# Patient Record
Sex: FEMALE | Race: WHITE | NOT HISPANIC OR LATINO | Employment: OTHER | URBAN - NONMETROPOLITAN AREA
[De-identification: names, ages, dates, MRNs, and addresses within clinical notes are randomized per-mention and may not be internally consistent; named-entity substitution may affect disease eponyms.]

---

## 2017-06-06 ENCOUNTER — COMMUNICATION - GICH (OUTPATIENT)
Dept: FAMILY MEDICINE | Facility: OTHER | Age: 72
End: 2017-06-06

## 2017-06-06 DIAGNOSIS — M81.0 AGE-RELATED OSTEOPOROSIS WITHOUT CURRENT PATHOLOGICAL FRACTURE: ICD-10-CM

## 2017-06-06 DIAGNOSIS — E78.5 HYPERLIPIDEMIA: ICD-10-CM

## 2017-08-25 ENCOUNTER — HISTORY (OUTPATIENT)
Dept: INTERNAL MEDICINE | Facility: OTHER | Age: 72
End: 2017-08-25

## 2017-08-29 ENCOUNTER — AMBULATORY - GICH (OUTPATIENT)
Dept: LAB | Facility: OTHER | Age: 72
End: 2017-08-29

## 2017-08-29 DIAGNOSIS — E78.5 HYPERLIPIDEMIA: ICD-10-CM

## 2017-08-29 LAB
A/G RATIO - HISTORICAL: 1.5 (ref 1–2)
ALBUMIN SERPL-MCNC: 4.2 G/DL (ref 3.5–5.7)
ALP SERPL-CCNC: 15 IU/L (ref 34–104)
ALT (SGPT) - HISTORICAL: 15 IU/L (ref 7–52)
ANION GAP - HISTORICAL: 10 (ref 5–18)
AST SERPL-CCNC: 23 IU/L (ref 13–39)
BILIRUB SERPL-MCNC: 0.4 MG/DL (ref 0.3–1)
BUN SERPL-MCNC: 14 MG/DL (ref 7–25)
BUN/CREAT RATIO - HISTORICAL: 21
CALCIUM SERPL-MCNC: 10.1 MG/DL (ref 8.6–10.3)
CHLORIDE SERPLBLD-SCNC: 108 MMOL/L (ref 98–107)
CHOL/HDL RATIO - HISTORICAL: 2.12
CHOLESTEROL TOTAL: 256 MG/DL
CO2 SERPL-SCNC: 25 MMOL/L (ref 21–31)
CREAT SERPL-MCNC: 0.66 MG/DL (ref 0.7–1.3)
GFR IF NOT AFRICAN AMERICAN - HISTORICAL: >60 ML/MIN/1.73M2
GLOBULIN - HISTORICAL: 2.8 G/DL (ref 2–3.7)
GLUCOSE SERPL-MCNC: 80 MG/DL (ref 70–105)
HDLC SERPL-MCNC: 121 MG/DL (ref 23–92)
LDLC SERPL CALC-MCNC: 115 MG/DL
NON-HDL CHOLESTEROL - HISTORICAL: 135 MG/DL
PATIENT STATUS - HISTORICAL: ABNORMAL
POTASSIUM SERPL-SCNC: 4.2 MMOL/L (ref 3.5–5.1)
PROT SERPL-MCNC: 7 G/DL (ref 6.4–8.9)
SODIUM SERPL-SCNC: 143 MMOL/L (ref 133–143)
TRIGL SERPL-MCNC: 101 MG/DL

## 2017-08-31 ENCOUNTER — COMMUNICATION - GICH (OUTPATIENT)
Dept: PHARMACY | Facility: OTHER | Age: 72
End: 2017-08-31

## 2017-08-31 ENCOUNTER — HISTORY (OUTPATIENT)
Dept: INTERNAL MEDICINE | Facility: OTHER | Age: 72
End: 2017-08-31

## 2017-08-31 ENCOUNTER — HOSPITAL ENCOUNTER (OUTPATIENT)
Dept: INFUSION THERAPY | Facility: OTHER | Age: 72
End: 2017-08-31
Attending: INTERNAL MEDICINE

## 2017-08-31 ENCOUNTER — OFFICE VISIT - GICH (OUTPATIENT)
Dept: INTERNAL MEDICINE | Facility: OTHER | Age: 72
End: 2017-08-31

## 2017-08-31 DIAGNOSIS — H81.10 BENIGN PAROXYSMAL VERTIGO: ICD-10-CM

## 2017-08-31 DIAGNOSIS — J44.9 CHRONIC OBSTRUCTIVE PULMONARY DISEASE (H): ICD-10-CM

## 2017-08-31 DIAGNOSIS — E78.2 MIXED HYPERLIPIDEMIA: ICD-10-CM

## 2017-08-31 DIAGNOSIS — M81.0 AGE-RELATED OSTEOPOROSIS WITHOUT CURRENT PATHOLOGICAL FRACTURE: ICD-10-CM

## 2017-08-31 DIAGNOSIS — Z78.9 OTHER SPECIFIED HEALTH STATUS (CODE): ICD-10-CM

## 2017-08-31 DIAGNOSIS — R63.4 ABNORMAL WEIGHT LOSS: ICD-10-CM

## 2017-08-31 DIAGNOSIS — I67.9 CEREBROVASCULAR DISEASE: ICD-10-CM

## 2017-08-31 DIAGNOSIS — Z53.29 PROCEDURE AND TREATMENT NOT CARRIED OUT BECAUSE OF PATIENT'S DECISION FOR OTHER REASONS: ICD-10-CM

## 2017-08-31 ASSESSMENT — PATIENT HEALTH QUESTIONNAIRE - PHQ9: SUM OF ALL RESPONSES TO PHQ QUESTIONS 1-9: 0

## 2017-09-01 ENCOUNTER — HOSPITAL ENCOUNTER (OUTPATIENT)
Dept: PHYSICAL THERAPY | Facility: OTHER | Age: 72
Setting detail: THERAPIES SERIES
End: 2017-09-01
Attending: INTERNAL MEDICINE

## 2017-09-01 DIAGNOSIS — H81.10 BENIGN PAROXYSMAL VERTIGO: ICD-10-CM

## 2017-09-06 ENCOUNTER — COMMUNICATION - GICH (OUTPATIENT)
Dept: PHYSICAL THERAPY | Facility: OTHER | Age: 72
End: 2017-09-06

## 2017-12-27 NOTE — PROGRESS NOTES
Patient Information     Patient Name MRN Sex Yanci Carter [id number] Female 1945      Progress Notes by Goodell, Brenda, RN at 2017  3:05 PM     Author:  Goodell, Brenda, RN Service:  (none) Author Type:  NURS- Registered Nurse     Filed:  2017  3:08 PM Date of Service:  2017  3:05 PM Status:  Signed     :  Goodell, Brenda, RN (NURS- Registered Nurse)            Patient arrived ambulatory for Reclast infusion. No labs needed. After infusion without incident, lock was flushed with saline and taken out. Patient encouraged to drink lots of water and to keep taking her calcium and vitamin D. Patient left ambulatory.

## 2017-12-28 NOTE — TELEPHONE ENCOUNTER
Patient Information     Patient Name MRN Sex Yanci Carter [id number] Female 1945      Telephone Encounter by Janes Gunter MD at 2017  1:10 PM     Author:  Janes Gunter MD Service:  (none) Author Type:  Physician     Filed:  2017  1:11 PM Encounter Date:  2017 Status:  Signed     :  Janes Gunter MD (Physician)            There are no indications for her TSH as it has been normal and her insurance will likely not cover the test an lo s[place].  We can certainly order but she may be responsible to pay for it.  Reclast can be scheduled through the shot nurse schedule.

## 2017-12-28 NOTE — TELEPHONE ENCOUNTER
Patient Information     Patient Name MRN Yanci Hurt [id number] Female 1945      Telephone Encounter by Neelima Roman at 2017  1:03 PM     Author:  Neelima Roman Service:  (none) Author Type:  (none)     Filed:  2017  1:05 PM Encounter Date:  2017 Status:  Signed     :  Neelima Roman            Patient notified of labs being placed and her to contact dermatology. Patient is stating that she wants her TSH included this time with labs, if possible, and also wants to redo her reclast around the same time as her appt on .  Neelima Roman LPN .............2017  1:04 PM

## 2017-12-28 NOTE — TELEPHONE ENCOUNTER
Patient Information     Patient Name MRN Sex Yanci Carter [id number] Female 1945      Telephone Encounter by Jeannie Mccord at 2017 10:54 AM     Author:  Jeannie Mccord Service:  (none) Author Type:  (none)     Filed:  2017 10:56 AM Encounter Date:  2017 Status:  Signed     :  Jeannie Mccord            DWS - PATIENT REQUESTING ORDER FOR LABS TO BE DONE PRIOR TO APPT ON 17. PATIENT WOULD ALSO LIKE ORDER FOR RECLAST TO BE DONE SAME DAY AS APPT AND ALSO REFERRAL FOR DERMATOLOGIST IN [place]. PLEASE CALL WHEN ORDERS PLACED.  PATIENT AND FRIEND ARE TRAVELING BUT WOULD LIKE TO HAVE APPTS SCHEDULED.    Jeannie Mccord ....................  2017   10:56 AM'

## 2017-12-28 NOTE — TELEPHONE ENCOUNTER
Patient Information     Patient Name MRN Sex Yanci Carter [id number] Female 1945      Telephone Encounter by Megan Gabriel PT at 2017 12:12 PM     Author:  Megan Gabriel PT Service:  (none) Author Type:  PT- Physical Therapist     Filed:  2017 12:15 PM Encounter Date:  2017 Status:  Signed     :  Megan Gabriel PT (PT- Physical Therapist)            Dr. Gunter,    This patient was referred to physical therapy by Dr. Jones for a vestibular evaluation. I assessed her, and her symptoms are not consistent with a peripheral vestibular disorder. With her history of vascular issues, I recommended she discuss her symptoms with you and present for further evaluation as appropriate.    Please contact me with any questions.    Thank you,  Megan Gabriel

## 2017-12-28 NOTE — TELEPHONE ENCOUNTER
Patient Information     Patient Name MRN Sex Yanci Carter [id number] Female 1945      Telephone Encounter by aSra Hawkins RPh at 2017 10:31 AM     Author:  Sara Hawkins RPh Service:  (none) Author Type:  PHARM- Pharmacist     Filed:  2017 10:32 AM Encounter Date:  2017 Status:  Signed     :  Sara Hawkins RPh (PHARM- Pharmacist)            Pt is requesting a refill prescription of alendronate 70mg once weekly be sent to [facility] pharmacy. There were not any refills on the last Rx at [facility] to transfer. Thanks.

## 2017-12-28 NOTE — TELEPHONE ENCOUNTER
Patient Information     Patient Name MRN Sex Yanci Carter [id number] Female 1945      Telephone Encounter by Janes Gunter MD at 2017  8:46 AM     Author:  Janes Gunter MD Service:  (none) Author Type:  Physician     Filed:  2017  8:46 AM Encounter Date:  2017 Status:  Signed     :  Janes Gunter MD (Physician)            Ok, I will need to see patient for this concern to be able to evaluate.

## 2017-12-28 NOTE — TELEPHONE ENCOUNTER
Patient Information     Patient Name MRN Sex Yanci Carter [id number] Female 1945      Telephone Encounter by Neelima Roman at 2017  2:31 PM     Author:  Neelima Roman Service:  (none) Author Type:  (none)     Filed:  2017  2:31 PM Encounter Date:  2017 Status:  Signed     :  Neelima Roman            Infusion needs a new order placed for the Reclast.  Neelima Roman LPN .............2017  2:31 PM

## 2017-12-28 NOTE — TELEPHONE ENCOUNTER
Patient Information     Patient Name MRN Yanci Hurt [id number] Female 1945      Telephone Encounter by Neelima Roman at 2017 11:51 AM     Author:  Neelima Roman Service:  (none) Author Type:  (none)     Filed:  2017 11:51 AM Encounter Date:  2017 Status:  Signed     :  Neelima Roman            Done.  Neelima Roman LPN .............2017  11:51 AM

## 2017-12-28 NOTE — TELEPHONE ENCOUNTER
Patient Information     Patient Name MRN Sex Yanci Carter [id number] Female 1945      Telephone Encounter by Janes Gunter MD at 2017 12:02 PM     Author:  Janes Gunter MD Service:  (none) Author Type:  Physician     Filed:  2017 12:03 PM Encounter Date:  2017 Status:  Signed     :  Janes Gunter MD (Physician)            Fasting labs are ordered. She can call dermatology to make her own appointment, no referral is necessary.

## 2017-12-28 NOTE — PROGRESS NOTES
Patient Information     Patient Name MRN Sex Yanci Carter [id number] Female 1945      Progress Notes by Jethro Jones MD at 2017  8:00 AM     Author:  Jethro Jones MD Service:  (none) Author Type:  Physician     Filed:  2017  8:56 AM Encounter Date:  2017 Status:  Signed     :  Jethro Jones MD (Physician)            Nursing Notes:   Sunita Lawson  2017  8:15 AM  Signed  Patient presents to the clinic for medication management.    Sunita Lawson LPN        2017 8:05 AM    Yanci Luther presents to clinic today for:   Chief Complaint    Patient presents with      Medication Management     HPI: Ms. Luther is a 72 y.o. female who presents today for evaluation of above.     (M81.0) Senile osteoporosis  (primary encounter diagnosis)  (H81.10) BPPV (benign paroxysmal positional vertigo), unspecified laterality  (R63.4) Abnormal weight loss  (J44.9) Chronic obstructive pulmonary disease, unspecified COPD type (HC)  (I67.9) Cerebrovascular small vessel disease  (E78.2) Mixed hyperlipidemia  (Z53.29) Refusal of statin medication by patient  (Z78.9) Refusal of aspirin by patient     Patient presents for medication refills.  She is confused why she is not seeing her primary care provider today, Dr. Gunter.  Evidently there was some kind of scheduling mistakes.  Nonetheless patient decided to proceed with visit today.  She wants to try getting her Reclast infusion for osteoporosis.    She states she's been having issues with this acute dizzy spells if she turns quickly in the hallway or when walking [place] a [place] she will get spinning sensation if she corrects her position it will resolve very abruptly just as fast as it occurred.  This will happen with certain positions.  Does not happen spontaneously.  Advised that she likely has BPPV and could benefit from Vestibular therapy.  She would like to see them.  Referral sent.    Abnormal weight loss, upon review of  her chart she has lost 45-50 pounds since 9/25/2014.   -- states that she stopped eating wheat and then lost weight. Labs recently obtained.   -- no recent CBC.     8/1/2013  WBC: 4.5     8/1/2013  RBC: 3.99  8/1/2013  HGB: 12.7  8/1/2013  HCT: 38.8   8/1/2013  MCV: 97    8/1/2013  MCH: 31.8   8/1/2013 MCHC: 32.7   8/1/2013  RDW: 12.2   8/1/2013  PLT: 167     Hyperlipidemia with evidence of microvascular cerebrovascular disease.  We talked about statin and aspirin therapy today.  She declines.    COPD, she declines need for inhalers or breathing medications.  She wants to do the natural treatments.    Ms. Luther's Body mass index is 19.7 kg/(m^2). This is within the normal range for a 72 y.o. Normal range for ages 18+ is between 18.5 and 24.9.   BP Readings from Last 1 Encounters:08/31/17 : 144/74  Ms. Cardoza blood pressure is out of the normal range for adults. Per JNC-8 guidelines normal adult blood pressure is < 120/80, pre-hypertensive is between 120/80 and 139/89, and hypertension is 140/90 or greater. Risks of hypertension were discussed. Patient's strategy will be weight loss, increased activity and reduced salt intake    Functional Capacity: > 4 METS.   Reports that she can climb a flight of stairs without any chest pain/heaviness or shortness of breath.   Patient reports no current symptoms of fevers, chills, nausea/vomiting.   No cough. No shortness of breath.   No change in bowel/bladder habits. No melena, hematochezia. No Hematuria.   No rashes. No palpitations.  No orthopnea/paroxysmal nocturnal dyspnea   No vision or hearing issues.   No significant mood issues   No bruising.     ANDERSON:  No flowsheet data found.    PHQ9:  PHQ Depression Screening 8/31/2017   Date of PHQ exam (doc flow) 8/31/2017   1. Lack of interest/pleasure 0 - Not at all   2. Feeling down/depressed 0 - Not at all   PHQ-2 TOTAL SCORE 0   3. Trouble sleeping 0 - Not at all   4. Decreased energy 0 - Not at all   5. Appetite change 0 -  Not at all   6. Feelings of failure 0 - Not at all   7. Trouble concentrating 0 - Not at all   8. Activity level 0 - Not at all   9. Hurting yourself 0 - Not at all   PHQ-9 TOTAL SCORE 0   PHQ-9 Severity Level none   Functional Impairment not difficult at all   Some recent data might be hidden         I have personally reviewed the past medical history, past surgical history, medications, allergies, family and social history as listed below, on 8/31/2017.    Patient Active Problem List      Diagnosis Date Noted     BPPV (benign paroxysmal positional vertigo) 08/31/2017     Abnormal weight loss 08/31/2017     Cerebrovascular small vessel disease 08/31/2017     Mixed hyperlipidemia 08/31/2017     Refusal of statin medication by patient 08/31/2017     Refusal of aspirin by patient 08/31/2017     Obstructive lung disease (HC) 09/15/2014     PERSONAL HISTORY OF MALIGNANT NEOPLASM OF TONGUE 05/27/2010     SEBORRHEIC KERATOSIS 05/27/2010     Senile osteoporosis 09/01/2009     Past Medical History:     Diagnosis  Date     History of throat cancer 2006    radiation      Osteoporosis      Past Surgical History:      Procedure  Laterality Date     FOREARM/WRIST SURGERY  2011    L radius/ulna fracture, s/p plating       KNEE ARTHROSCOPY  4-2012    [place]       KNEE ARTHROSCOPY  9-2012    meniscus tear, Dr. Vitale       Current Outpatient Prescriptions       Medication  Sig Dispense Refill     alendronate (FOSAMAX) 70 mg tablet Take 1 tablet by mouth once a week in the morning. Take on empty stomach with full glass of water. Do not lie down for 1 hr. 12 tablet 3     zoledronic acid in mannitol & water (RECLAST) 5 mg/100 mL infusion Inject 5 mg intravenous one time for 1 dose. 100 mL 0     zoledronic acid-mannitol & water (RECLAST) 5 mg/100 mL infusion Inject 5 mg intravenous one time for 1 dose. 100 mL 0     Allergies      Allergen   Reactions     Aspirin  Other - Describe In Comment Field     -- Refuses as of 8/31/2017       "Statins-Hmg-Coa Reductase Inhibitors  Other - Describe In Comment Field     -- Refuses as of 8/31/2017      Family History       Problem   Relation Age of Onset     Adopted: Yes        Genetic  Other      Adopted       Cancer-breast  No Family History      Family Status     Relation  Status     Other Other    PATIENT IS ADOPTED      Daughter Alive     Other      No Family History      Social History     Social History        Marital status:       Spouse name: N/A     Number of children:  1     Years of education:  N/A     Social History Main Topics       Smoking status: Former Smoker     Smokeless tobacco: Never Used     Alcohol use Yes     Drug use: Not on file     Sexual activity: Not on file     Other Topics  Concern     Exercise Yes     Seat Belt Yes     Social History Narrative     She is originally from [facility].     She lives in [place] in the [place]    Formerly worked with stained glass.     Pertinent ROS was performed and was negative as noted in HPI above.     EXAM:   Vitals:     08/31/17 0807   BP: 144/74   Pulse: 72   Weight: 53.3 kg (117 lb 8 oz)   Height: 1.645 m (5' 4.75\")     BP Readings from Last 3 Encounters:    08/31/17 144/74   10/07/15 135/74   08/06/15 138/76     Wt Readings from Last 3 Encounters:    08/31/17 53.3 kg (117 lb 8 oz)   10/07/15 73.5 kg (162 lb)   08/06/15 67.6 kg (149 lb)     Estimated body mass index is 19.7 kg/(m^2) as calculated from the following:    Height as of this encounter: 1.645 m (5' 4.75\").    Weight as of this encounter: 53.3 kg (117 lb 8 oz).     EXAM:  Constitutional: Pleasant, alert, appropriate appearance for age. No acute distress  ENT: right neck - firm skin, Normocephalic, Atraumatic, Thyroid without nodules or tenderness   Nose/Mouth: Oral pharynx without erythema or exudates, Nose is patent bilaterally, no rhinorrhea and Dental hygeine adequate   Eyes:  Extraocular muscles intact, " Sclera non-icteric, Conjunctiva without erythema  Lymphatic Exam: Non-palpable nodes in neck, clavicular regions  Pulmonary: Lungs are clear to auscultation bilaterally, without wheezes or crackles  Cardiovascular Exam: regular rate and rhythm, brisk carotid upstroke without bruits, peripheral pulses very brisk, no pedal edema  Gastrointestinal Exam: Soft, non-tender, non-distended, positive bowel sounds  Integument: right neck -- fibrosis appearing of the skin.  Neurologic Exam: CN 3-12 grossly intact   Musculoskeletal Exam: Moves upper and lower extremities symmetrically, No focal weakness  Gait and station appear grossly normal  Psychiatric Exam: Awake and Alert, Affect and mood appropriate  Speech is fluent, Thought process is normal    INVESTIGATIONS:  Results for orders placed or performed in visit on 08/29/17      COMPLETE METABOLIC PANEL      Result  Value Ref Range    SODIUM 143 133 - 143 mmol/L    POTASSIUM 4.2 3.5 - 5.1 mmol/L    CHLORIDE 108 (H) 98 - 107 mmol/L    CO2,TOTAL 25 21 - 31 mmol/L    ANION GAP 10 5 - 18                    GLUCOSE 80 70 - 105 mg/dL    CALCIUM 10.1 8.6 - 10.3 mg/dL    BUN 14 7 - 25 mg/dL    CREATININE 0.66 (L) 0.70 - 1.30 mg/dL    BUN/CREAT RATIO           21                    GFR if African American >60 >60 ml/min/1.73m2    GFR if not African American >60 >60 ml/min/1.73m2    ALBUMIN 4.2 3.5 - 5.7 g/dL    PROTEIN,TOTAL 7.0 6.4 - 8.9 g/dL    GLOBULIN                  2.8 2.0 - 3.7 g/dL    A/G RATIO 1.5 1.0 - 2.0                    BILIRUBIN,TOTAL 0.4 0.3 - 1.0 mg/dL    ALK PHOSPHATASE 15 (L) 34 - 104 IU/L    ALT (SGPT) 15 7 - 52 IU/L    AST (SGOT) 23 13 - 39 IU/L   LIPID PANEL      Result  Value Ref Range    CHOLESTEROL,TOTAL 256 (H) <200 mg/dL    TRIGLYCERIDES 101 <150 mg/dL    HDL CHOLESTEROL 121 (H) 23 - 92 mg/dL    NON-HDL CHOLESTEROL 135 <145 mg/dl    CHOL/HDL RATIO            2.12 <4.50                    LDL CHOLESTEROL 115 (H) <100 mg/dL    PATIENT STATUS             FASTING                     Brain MRI/MRA from 2010 -- show to the microvascular cerebrovascular disease. History of old, small hemorrhage.    -- We discussed statin therapy, aspirin Therapy today, patient declines.      ASSESSMENT AND PLAN:  Yanci was seen today for medication management.    Diagnoses and all orders for this visit:    Senile osteoporosis  -     zoledronic acid in mannitol & water (RECLAST) 5 mg/100 mL infusion; Inject 5 mg intravenous one time for 1 dose.    BPPV (benign paroxysmal positional vertigo), unspecified laterality  -     AMB CONSULT TO PHYSICAL THERAPY; Future    Abnormal weight loss    Chronic obstructive pulmonary disease, unspecified COPD type (HC)    Cerebrovascular small vessel disease    Mixed hyperlipidemia    Refusal of statin medication by patient    Refusal of aspirin by patient    lab results and schedule of future lab studies reviewed with patient, reviewed diet, exercise and weight control, recommended sodium restriction, cardiovascular risk and specific lipid/LDL goals reviewed, use of aspirin to prevent MI and TIA's discussed    -- Expected clinical course discussed   -- Medications and their side effects discussed    Yanci is also recommended to eat a heart-healthy diet, do regular aerobic exercises, maintain a desirable body weight, and avoid tobacco products. These recommendations are from the American Heart Association (AHA) which stresses the importance of lifestyle changes to lower cardiovascular disease risk.     Additionally -- Continue Risk Factor Modification and Lifestyle Modifications     Return in about 1 year (around 8/31/2018) for -- follow-up with Dr. Gunter.    Patient Instructions     Labs look good -- but cholesterol is a little high.     Results for orders placed or performed in visit on 08/29/17      COMPLETE METABOLIC PANEL      Result  Value Ref Range    SODIUM 143 133 - 143 mmol/L    POTASSIUM 4.2 3.5 - 5.1 mmol/L    CHLORIDE 108 (H) 98 - 107  mmol/L    CO2,TOTAL 25 21 - 31 mmol/L    ANION GAP 10 5 - 18                    GLUCOSE 80 70 - 105 mg/dL    CALCIUM 10.1 8.6 - 10.3 mg/dL    BUN 14 7 - 25 mg/dL    CREATININE 0.66 (L) 0.70 - 1.30 mg/dL    BUN/CREAT RATIO           21                    GFR if African American >60 >60 ml/min/1.73m2    GFR if not African American >60 >60 ml/min/1.73m2    ALBUMIN 4.2 3.5 - 5.7 g/dL    PROTEIN,TOTAL 7.0 6.4 - 8.9 g/dL    GLOBULIN                  2.8 2.0 - 3.7 g/dL    A/G RATIO 1.5 1.0 - 2.0                    BILIRUBIN,TOTAL 0.4 0.3 - 1.0 mg/dL    ALK PHOSPHATASE 15 (L) 34 - 104 IU/L    ALT (SGPT) 15 7 - 52 IU/L    AST (SGOT) 23 13 - 39 IU/L   LIPID PANEL      Result  Value Ref Range    CHOLESTEROL,TOTAL 256 (H) <200 mg/dL    TRIGLYCERIDES 101 <150 mg/dL    HDL CHOLESTEROL 121 (H) 23 - 92 mg/dL    NON-HDL CHOLESTEROL 135 <145 mg/dl    CHOL/HDL RATIO            2.12 <4.50                    LDL CHOLESTEROL 115 (H) <100 mg/dL    PATIENT STATUS            FASTING                      Reclast orders placed.       Consider statin therapy for your cholesterol, to reduce stroke and heart attack risk.   Consider daily or even a few days per week -- low-dose Aspirin 81 mg tablet.       Physical therapy referral sent  - they will call with date/time of appointment.     --- start Vestibular Therapy    Return in approximately 1 year, or sooner as needed for follow-up with Dr. Gunter.    Clinic : [phone]  Appointment line: [phone]         Positional Vertigo   ________________________________________________________________________  KEY POINTS    Positional vertigo is an inner ear problem that causes brief but sometimes severe feelings of spinning.    Positional vertigo may go away without treatment. You may need to take medicine or learn repositioning techniques for mild vertigo.    Do not try to drive or operate tools or machinery if you suddenly have feelings of  spinning.  ________________________________________________________________________  What is positional vertigo?  Positional vertigo is an inner ear problem. It causes brief but sometimes severe feelings of spinning when you tilt your head back, look up or down, or roll over in bed. This spinning is also called dizziness or vertigo. There are other names for the condition such as benign positional vertigo, positioning vertigo, and benign paroxysmal positional vertigo.  What is the cause?  In the inner part of your ear are 3 fluid-filled tubes called semicircular canals. When you move your body or head, the fluid in these canals tells your brain what your position is. That helps you keep your balance as you change position.  Other areas in your ear tell your brain when you move your head (side to side, right or left, up or down) and what your head s position is in relation to the ground (or gravity). Small crystals of calcium in these areas may break loose and get into the fluid in your semicircular canals. The crystals float around in the canals and send the wrong messages to your brain, which cause the feeling of spinning.  People over age 50 may have this condition because the calcium crystals break free more easily as people age. More likely causes for people under age 50 include:    Head injury    Ear infections    Ménière's disease, which is a disease of the inner ear  What are the symptoms?  Symptoms may include:    A sudden feeling that you are spinning or that the room is spinning    Trouble keeping your balance    Blurred vision with the spinning    Nausea or vomiting for several minutes or even hours after the vertigo  How is it diagnosed?  Your healthcare provider will ask about your symptoms and medical history and examine you. Tests may include:    An ear exam    A hearing test    A position test, in which you lie back and move your head in certain directions. If you have positional vertigo, these  movements will cause feelings of spinning and fast, jerky movements of your eyes. Your provider may have you wear magnifying goggles to help him see the eye movements. This test may be done with an ENG (electronystagmogram), which uses small wires pasted or taped to your head to measure and record eye movements.  You may have tests or scans to check for other possible causes of your symptoms, such as a stroke or brain tumor.  How is it treated?  Positional vertigo may go away within several weeks, even without treatment.  You may need to take medicine for mild symptoms to reduce the vertigo and any nausea you may be having. The medicine may make you sleepy, so talk to your healthcare provider about any medicine you are taking.  If your vertigo lasts for many days or weeks, you may need to learn repositioning techniques. Repositioning involves moving your head into 4 specific positions. You hold each position for about 30 seconds. Doing this works with gravity to move the crystals into an area of the inner ear that won't cause any problems. Your healthcare provider may refer you to a physical therapist to learn and practice these techniques.  If you have severe vertigo that has not gone away after a few weeks, or if it comes back after treatment, you may need surgery. Two types of surgery may be used to stop the vertigo.    Part of your ear may be surgically plugged to keep the crystals from moving.    The nerve that connects the brain with the semicircular canal may be cut to keep your brain from sensing the movement of the crystals.  How can I take care of myself?   Follow the full course of treatment prescribed by your healthcare provider. In addition:    If your vertigo does not allow you to continue your usual routine safely, you should rest at home.    Do not try to drive, operate tools or machinery, or do other tasks, even cooking, that could endanger yourself or others if you suddenly have feelings of  spinning.  Ask your provider:    How and when you will get your test results    How long it will take to recover    If there are activities you should avoid and when you can return to your normal activities    How to take care of yourself at home    What symptoms or problems you should watch for and what to do if you have them  Make sure you know when you should come back for a checkup. Keep all appointments for provider visits or tests.  Developed by [employer].  Adult Advisor 2016.3 published by [employer].  Last modified: 2016-03-23  Last reviewed: 2015-08-27  This content is reviewed periodically and is subject to change as new health information becomes available. The information is intended to inform and educate and is not a replacement for medical evaluation, advice, diagnosis or treatment by a healthcare professional.  References   Adult Advisor 2016.3 Index    Copyright   2016 [employer], a division of [employer] All rights reserved.           Jethro Jones MD

## 2017-12-29 NOTE — PATIENT INSTRUCTIONS
Patient Information     Patient Name MRN Sex Yanci Carter [id number] Female 1945      Patient Instructions by Jethro Jones MD at 2017  8:22 AM     Author:  Jethro Jones MD  Service:  (none) Author Type:  Physician     Filed:  2017  8:27 AM  Encounter Date:  2017 Status:  Addendum     :  Jethro Jones MD (Physician)        Related Notes: Original Note by Jethro Jones MD (Physician) filed at 2017  8:26 AM            Labs look good -- but cholesterol is a little high.     Results for orders placed or performed in visit on 17      COMPLETE METABOLIC PANEL      Result  Value Ref Range    SODIUM 143 133 - 143 mmol/L    POTASSIUM 4.2 3.5 - 5.1 mmol/L    CHLORIDE 108 (H) 98 - 107 mmol/L    CO2,TOTAL 25 21 - 31 mmol/L    ANION GAP 10 5 - 18                    GLUCOSE 80 70 - 105 mg/dL    CALCIUM 10.1 8.6 - 10.3 mg/dL    BUN 14 7 - 25 mg/dL    CREATININE 0.66 (L) 0.70 - 1.30 mg/dL    BUN/CREAT RATIO           21                    GFR if African American >60 >60 ml/min/1.73m2    GFR if not African American >60 >60 ml/min/1.73m2    ALBUMIN 4.2 3.5 - 5.7 g/dL    PROTEIN,TOTAL 7.0 6.4 - 8.9 g/dL    GLOBULIN                  2.8 2.0 - 3.7 g/dL    A/G RATIO 1.5 1.0 - 2.0                    BILIRUBIN,TOTAL 0.4 0.3 - 1.0 mg/dL    ALK PHOSPHATASE 15 (L) 34 - 104 IU/L    ALT (SGPT) 15 7 - 52 IU/L    AST (SGOT) 23 13 - 39 IU/L   LIPID PANEL      Result  Value Ref Range    CHOLESTEROL,TOTAL 256 (H) <200 mg/dL    TRIGLYCERIDES 101 <150 mg/dL    HDL CHOLESTEROL 121 (H) 23 - 92 mg/dL    NON-HDL CHOLESTEROL 135 <145 mg/dl    CHOL/HDL RATIO            2.12 <4.50                    LDL CHOLESTEROL 115 (H) <100 mg/dL    PATIENT STATUS            FASTING                      Reclast orders placed.       Consider statin therapy for your cholesterol, to reduce stroke and heart attack risk.   Consider daily or even a few days per week -- low-dose Aspirin 81 mg tablet.        Physical therapy referral sent  - they will call with date/time of appointment.     --- start Vestibular Therapy    Return in approximately 1 year, or sooner as needed for follow-up with Dr. Gunter.    Clinic : [phone]  Appointment line: [phone]         Positional Vertigo   ________________________________________________________________________  KEY POINTS    Positional vertigo is an inner ear problem that causes brief but sometimes severe feelings of spinning.    Positional vertigo may go away without treatment. You may need to take medicine or learn repositioning techniques for mild vertigo.    Do not try to drive or operate tools or machinery if you suddenly have feelings of spinning.  ________________________________________________________________________  What is positional vertigo?  Positional vertigo is an inner ear problem. It causes brief but sometimes severe feelings of spinning when you tilt your head back, look up or down, or roll over in bed. This spinning is also called dizziness or vertigo. There are other names for the condition such as benign positional vertigo, positioning vertigo, and benign paroxysmal positional vertigo.  What is the cause?  In the inner part of your ear are 3 fluid-filled tubes called semicircular canals. When you move your body or head, the fluid in these canals tells your brain what your position is. That helps you keep your balance as you change position.  Other areas in your ear tell your brain when you move your head (side to side, right or left, up or down) and what your head s position is in relation to the ground (or gravity). Small crystals of calcium in these areas may break loose and get into the fluid in your semicircular canals. The crystals float around in the canals and send the wrong messages to your brain, which cause the feeling of spinning.  People over age 50 may have this condition because the calcium crystals break free more easily  as people age. More likely causes for people under age 50 include:    Head injury    Ear infections    Ménière's disease, which is a disease of the inner ear  What are the symptoms?  Symptoms may include:    A sudden feeling that you are spinning or that the room is spinning    Trouble keeping your balance    Blurred vision with the spinning    Nausea or vomiting for several minutes or even hours after the vertigo  How is it diagnosed?  Your healthcare provider will ask about your symptoms and medical history and examine you. Tests may include:    An ear exam    A hearing test    A position test, in which you lie back and move your head in certain directions. If you have positional vertigo, these movements will cause feelings of spinning and fast, jerky movements of your eyes. Your provider may have you wear magnifying goggles to help him see the eye movements. This test may be done with an ENG (electronystagmogram), which uses small wires pasted or taped to your head to measure and record eye movements.  You may have tests or scans to check for other possible causes of your symptoms, such as a stroke or brain tumor.  How is it treated?  Positional vertigo may go away within several weeks, even without treatment.  You may need to take medicine for mild symptoms to reduce the vertigo and any nausea you may be having. The medicine may make you sleepy, so talk to your healthcare provider about any medicine you are taking.  If your vertigo lasts for many days or weeks, you may need to learn repositioning techniques. Repositioning involves moving your head into 4 specific positions. You hold each position for about 30 seconds. Doing this works with gravity to move the crystals into an area of the inner ear that won't cause any problems. Your healthcare provider may refer you to a physical therapist to learn and practice these techniques.  If you have severe vertigo that has not gone away after a few weeks, or if it comes  back after treatment, you may need surgery. Two types of surgery may be used to stop the vertigo.    Part of your ear may be surgically plugged to keep the crystals from moving.    The nerve that connects the brain with the semicircular canal may be cut to keep your brain from sensing the movement of the crystals.  How can I take care of myself?   Follow the full course of treatment prescribed by your healthcare provider. In addition:    If your vertigo does not allow you to continue your usual routine safely, you should rest at home.    Do not try to drive, operate tools or machinery, or do other tasks, even cooking, that could endanger yourself or others if you suddenly have feelings of spinning.  Ask your provider:    How and when you will get your test results    How long it will take to recover    If there are activities you should avoid and when you can return to your normal activities    How to take care of yourself at home    What symptoms or problems you should watch for and what to do if you have them  Make sure you know when you should come back for a checkup. Keep all appointments for provider visits or tests.  Developed by [employer].  Adult Advisor 2016.3 published by [employer].  Last modified: 2016-03-23  Last reviewed: 2015-08-27  This content is reviewed periodically and is subject to change as new health information becomes available. The information is intended to inform and educate and is not a replacement for medical evaluation, advice, diagnosis or treatment by a healthcare professional.  References   Adult Advisor 2016.3 Index    Copyright   2016 [employer], a division of [employer] All rights reserved.

## 2017-12-30 NOTE — INITIAL ASSESSMENTS
Patient Information     Patient Name MRN Sex Yanci Carter [id number] Female 1945      Initial Assessments by Megan Gabriel PT at 2017  7:57 AM     Author:  Megan Gabriel PT Service:  (none) Author Type:  PT- Physical Therapist     Filed:  2017  2:42 PM Date of Service:  2017  7:57 AM Status:  Signed     :  Megan Gabriel PT (PT- Physical Therapist)            [facility]  Outpatient PT   Initial Evaluation  Vestibular Eval/ Discharge    Date of Service: 2017     Patient Name: Yanci Luther   YOB: 1945   Referring MD/Provider: Referring MD/Provider: Jethro Jones MD  Diagnosis: episodic dizziness  Treatment Diagnosis: Medical and Treatment Diagnosis: vertigo  Insurance: Medicare and AARP  Start of Care Date: Start of Service: 2017   Certification Dates: From: Start of Service: 2017    Re-Cert Due: Medicare/MA Re-Cert Due: 10/27/2017    Living Situations:  Independent in Living Situation     Preadmission Functional Mobility: Independent  Precautions:    Cognition:  Oriented to Person, Place, and Time.     Were cultural / age or other special adaptations needed? No      Patient is a vulnerable adult: No      Patient is aware of diagnosis: Yes      Risks and benefits explained: Yes    Patient arrived 25 minutes late for evaluation today. Items not completed were deferred due to time constraints.    Subjective      Current Complaints/Reason for Referral: Yanci Luther  is a 72 y.o. female  who comes to physical therapy today with a chief complaint of dizziness.    Date of onset: about a year  Details: Dizziness comes and goes very quickly. The world goes around just once and she sits down and it goes away.     Dizziness occurs during the following activities:    Sit to/from supine: no   Rolling L/R: no   Sit to stand: no   Bending over: no   Looking up/down: no   Head turning: no   Gait: yes, but comes on spontaneously   Stairs:  no   Other activities that pt reports dizziness with: nothing she can put her finger on  Pt reports severity of dizziness as 0/10 (0= no dizziness, 10= worst dizziness possible)    Vision changes:   No, but hx of cataract surgery 2 years ago.    Hearing symptoms include:  Yes fullness/ fluid in ears.  Yes ringing in ears.     Other symptoms include (pain in neck, headaches, recently sick):  no    Patient has had 0 falls.  Assistive device:  none        Patient Specific Functional and Pain Scales (PSFS): Done at [facility] on 9/1/2017    Clinician Instructions: Complete after the history and before the exam.    Initial Assessment: We want to know what 3 activities in your life you are unable to perform, or are having the most difficulty performing, as a result of your chief problem. Please list and score at least 3 activities that you are unable to perform, or having the most difficulty performing, because of your chief problem.   Patient Specific Activity Scoring Scheme (score one number for each activity):   Activity Score (0-10)  0= Unable to perform activity  10= Able to perform activity at same level as before injury or problem   1. hiking 10/10   2.  /10   3.  /10   4.  /10   5.  /10   Totals:  10/10 = 100 % ability which relates to 0% impairment    Patient verbally states that they understand that the information they have provided above is current and complete to the best of their knowledge.    Patient Specific Functional Scale Modifier Scale Conversion: (patient's modifier that correlates with pt's score on PSFS): 0-CN (100% Impaired).    Prior Level:  Minimal to no difficulty completing the above functional activities.     Occupation: retired  Patient reports the following restrictions when performing home/work tasks: none    Previous Treatment:    Meds -  none  Physical Therapy - no  Injections - no  Surgery - no    Diagnostics:  Reviewed (see chart)   Current Medications:  Reviewed (see chart)    Drug  "Allergies:  Reviewed (see chart)  ?   Latex Allergy:  No    PMHx (including TBI, surgical, migraine, injuries, falls): no    Patients goals for therapy:  Eliminate vertigo symptoms and improve stability for return to prior functional level.      Objective    Items left blank indicate that the test was inappropriate or not meaningful at the time of evaluation and therefore not performed.    Cervical ROM:  WNL    Occulomotor Testing:    Smooth Pursuit: WNL  aberrant movement vertically with right gaze    End Eye ROM: WNL  Vergence:          WNL   Saccades WNL   Head Thrust: WNL   VOR Cx: Not tested    Spontaneous Nystagmus:  none  Gaze Evoked Nystagmus:  none    Head Shake Test: negative  Tragus Test:  Not performed  Valsalva Test:  Not performed  Vibration to SCM:  Not performed    Coordination:  Finger to Nose: Not Tested  Rapid Alternating Hand Movements:  Not Tested    Special Tests:  Modified CTSIB:    condition 1 trial 1: 30 trial 2: NT  condition 2 trial 1: 30 trial 2: NT  condition 3 trial 1: 30 trial 2: NT  condition 4 trial 1: 10 trial 2: 30    Vertebral artery test: negative    Positional Testing:  [place] Hallpike Left: negative  [place] Hallpike Right: negative  Roll Test Left:  negative  Roll Test Right:  negative         Dynamic Illegible E: Not performed  Mini-BESTest: Not performed  Dynamic Gait Index: Not performed  Fall Risk Screening: No risk factors identified.       Treatment performed today:     Patient educated on balance and the 3 components of balance.  Patient educated on the vestibular system and inner ear.  Patient educated on BPPV.      Vestibular \"I\" Exercises:    *Gaze x1: Stationary letter \"I\": standing with head turns x30 seconds. Progressed to partial tandem stance to increase challenge. *     Other Intervention:  Foam balance, eyes closed *    Home Exercise Program:  Patient educated in safety precautions due to the impairments of difficulty with gait and balance.   Partial tandem gaze " x1  Romberg on foam, eyes closed      Assessment    Therapist Assessment / Clinical Impression:  Signs and symptoms consistent with possible mild vestibular hypofunction, but this does not appear to be a peripheral disorder based on available testing performed today. Recommend follow-up with PCP and additional testing as appropriate.    Functional Impairment(s): See subjective on initial evaluation and Functional Assessment / Summary Report from TO.    Physical Impairment(s):  none      G Codes and Modifier taken from patient completing the PSFS and clinician s judgment: Done at [facility] on 9/1/2017    Initial Primary G Code and Modifier:    Per the Patient's intake and/or assessment the Primary G Code is: Body Position .   The Patient's Impairment, Limitation or Restriction Modifier would be best described as: CI - 1% - 20% Impairment.   Goal Primary G Code and Modifier:    The Patient's G Code Goal would be: Body Position    The Patient's Impairment, Limitation or Restriction Modifier goal would be best described as: CI - 1% - 20% Impairment.     Discharge Primary G Code and Modifier:      The Patient's status upon Discharge is Body Position    The Patient's Impairment, Limitation or Restriction Modifier would be best described as CI - 1% - 20% Impairment.     Plan    Follow up with Dr. Gunter with recommendation for further testing. Possible referral to [facility] Dizziness and Balance clinic.    Thank you for your referral to [facility].  Please call with any questions, concerns or comments.  [phone]  Megan Gabriel, PT, DPT    The signature, of the referring medical provider, on this document indicates certification of the above prescribed plan of care and is medically necessary.

## 2018-01-27 VITALS
HEIGHT: 65 IN | DIASTOLIC BLOOD PRESSURE: 74 MMHG | SYSTOLIC BLOOD PRESSURE: 144 MMHG | HEART RATE: 72 BPM | BODY MASS INDEX: 19.58 KG/M2 | WEIGHT: 117.5 LBS

## 2018-02-01 ASSESSMENT — PATIENT HEALTH QUESTIONNAIRE - PHQ9: SUM OF ALL RESPONSES TO PHQ QUESTIONS 1-9: 0

## 2018-02-23 ENCOUNTER — DOCUMENTATION ONLY (OUTPATIENT)
Dept: FAMILY MEDICINE | Facility: OTHER | Age: 73
End: 2018-02-23

## 2018-02-23 PROBLEM — I67.9 CEREBROVASCULAR SMALL VESSEL DISEASE: Status: ACTIVE | Noted: 2017-08-31

## 2018-02-23 PROBLEM — H81.10 BPPV (BENIGN PAROXYSMAL POSITIONAL VERTIGO): Status: ACTIVE | Noted: 2017-08-31

## 2018-02-23 PROBLEM — Z53.20 REFUSAL OF STATIN MEDICATION BY PATIENT: Status: ACTIVE | Noted: 2017-08-31

## 2018-02-23 PROBLEM — Z78.9: Status: ACTIVE | Noted: 2017-08-31

## 2018-02-23 PROBLEM — E78.2 MIXED HYPERLIPIDEMIA: Status: ACTIVE | Noted: 2017-08-31

## 2018-02-23 PROBLEM — R63.4 ABNORMAL WEIGHT LOSS: Status: ACTIVE | Noted: 2017-08-31

## 2018-02-23 RX ORDER — ALENDRONATE SODIUM 70 MG/1
70 TABLET ORAL WEEKLY
COMMUNITY
Start: 2017-08-31 | End: 2018-09-04

## 2018-07-03 ENCOUNTER — TELEPHONE (OUTPATIENT)
Dept: FAMILY MEDICINE | Facility: OTHER | Age: 73
End: 2018-07-03

## 2018-07-03 DIAGNOSIS — E78.2 MIXED HYPERLIPIDEMIA: Primary | ICD-10-CM

## 2018-07-03 NOTE — TELEPHONE ENCOUNTER
D[facility] patient:  Has labs & physical scheduled for 9/4/2018.  Please submit orders for labs    Thank you

## 2018-07-23 NOTE — PROGRESS NOTES
Patient Information     Patient Name  Yanci Luther MRN  [id number] Sex  Female   1945      Letter by Janes Gunter MD at      Author:  Janes Gunter MD Service:  (none) Author Type:  (none)    Filed:   Encounter Date:  2017 Status:  (Other)           Yanci Luther  [address]  [facility] [address]          2017    Dear Ms. Luther:    Your recent lab values can be seen below.     Your cholesterol panel continues to be mildly elevated and this can be improved with a good diet and daily exercise. Diabetes screening with fasting glucose, liver and kidney testing all came back normal which is reassuring. We should repeat this again in one year.    If you have any questions, do not hesitate to contact me.    Results for orders placed or performed in visit on 17      COMPLETE METABOLIC PANEL      Result  Value Ref Range    SODIUM 143 133 - 143 mmol/L    POTASSIUM 4.2 3.5 - 5.1 mmol/L    CHLORIDE 108 (H) 98 - 107 mmol/L    CO2,TOTAL 25 21 - 31 mmol/L    ANION GAP 10 5 - 18                    GLUCOSE 80 70 - 105 mg/dL    CALCIUM 10.1 8.6 - 10.3 mg/dL    BUN 14 7 - 25 mg/dL    CREATININE 0.66 (L) 0.70 - 1.30 mg/dL    BUN/CREAT RATIO           21                    GFR if African American >60 >60 ml/min/1.73m2    GFR if not African American >60 >60 ml/min/1.73m2    ALBUMIN 4.2 3.5 - 5.7 g/dL    PROTEIN,TOTAL 7.0 6.4 - 8.9 g/dL    GLOBULIN                  2.8 2.0 - 3.7 g/dL    A/G RATIO 1.5 1.0 - 2.0                    BILIRUBIN,TOTAL 0.4 0.3 - 1.0 mg/dL    ALK PHOSPHATASE 15 (L) 34 - 104 IU/L    ALT (SGPT) 15 7 - 52 IU/L    AST (SGOT) 23 13 - 39 IU/L   LIPID PANEL      Result  Value Ref Range    CHOLESTEROL,TOTAL 256 (H) <200 mg/dL    TRIGLYCERIDES 101 <150 mg/dL    HDL CHOLESTEROL 121 (H) 23 - 92 mg/dL    NON-HDL CHOLESTEROL 135 <145 mg/dl    CHOL/HDL RATIO            2.12 <4.50                    LDL CHOLESTEROL 115 (H) <100 mg/dL    PATIENT STATUS            FASTING                          Sincerely,        Ravin Gunter MD  Family Medicine

## 2018-08-29 ENCOUNTER — TELEPHONE (OUTPATIENT)
Dept: LAB | Facility: OTHER | Age: 73
End: 2018-08-29

## 2018-08-29 DIAGNOSIS — E78.2 MIXED HYPERLIPIDEMIA: Primary | ICD-10-CM

## 2018-08-29 PROBLEM — R63.4 ABNORMAL WEIGHT LOSS: Status: RESOLVED | Noted: 2017-08-31 | Resolved: 2018-08-29

## 2018-09-04 ENCOUNTER — OFFICE VISIT (OUTPATIENT)
Dept: FAMILY MEDICINE | Facility: OTHER | Age: 73
End: 2018-09-04
Attending: FAMILY MEDICINE
Payer: MEDICARE

## 2018-09-04 VITALS
BODY MASS INDEX: 21.58 KG/M2 | HEIGHT: 64 IN | SYSTOLIC BLOOD PRESSURE: 124 MMHG | HEART RATE: 72 BPM | WEIGHT: 126.4 LBS | DIASTOLIC BLOOD PRESSURE: 82 MMHG

## 2018-09-04 DIAGNOSIS — Z12.31 ENCOUNTER FOR SCREENING MAMMOGRAM FOR MALIGNANT NEOPLASM OF BREAST: ICD-10-CM

## 2018-09-04 DIAGNOSIS — M81.0 SENILE OSTEOPOROSIS: Primary | ICD-10-CM

## 2018-09-04 DIAGNOSIS — E78.2 MIXED HYPERLIPIDEMIA: ICD-10-CM

## 2018-09-04 DIAGNOSIS — Z12.31 ENCOUNTER FOR SCREENING MAMMOGRAM FOR BREAST CANCER: ICD-10-CM

## 2018-09-04 LAB
ALBUMIN SERPL-MCNC: 4.6 G/DL (ref 3.5–5.7)
ALP SERPL-CCNC: 19 U/L (ref 34–104)
ALT SERPL W P-5'-P-CCNC: 18 U/L (ref 7–52)
ANION GAP SERPL CALCULATED.3IONS-SCNC: 7 MMOL/L (ref 3–14)
AST SERPL W P-5'-P-CCNC: 21 U/L (ref 13–39)
BILIRUB SERPL-MCNC: 0.7 MG/DL (ref 0.3–1)
BUN SERPL-MCNC: 17 MG/DL (ref 7–25)
CALCIUM SERPL-MCNC: 10.6 MG/DL (ref 8.6–10.3)
CHLORIDE SERPL-SCNC: 105 MMOL/L (ref 98–107)
CHOLEST SERPL-MCNC: 272 MG/DL
CO2 SERPL-SCNC: 30 MMOL/L (ref 21–31)
CREAT SERPL-MCNC: 0.67 MG/DL (ref 0.6–1.2)
GFR SERPL CREATININE-BSD FRML MDRD: 86 ML/MIN/1.7M2
GLUCOSE SERPL-MCNC: 89 MG/DL (ref 70–105)
HDLC SERPL-MCNC: 150 MG/DL (ref 23–92)
LDLC SERPL CALC-MCNC: 108 MG/DL
NONHDLC SERPL-MCNC: 122 MG/DL
POTASSIUM SERPL-SCNC: 4 MMOL/L (ref 3.5–5.1)
PROT SERPL-MCNC: 7.7 G/DL (ref 6.4–8.9)
SODIUM SERPL-SCNC: 142 MMOL/L (ref 134–144)
TRIGL SERPL-MCNC: 69 MG/DL

## 2018-09-04 PROCEDURE — 36415 COLL VENOUS BLD VENIPUNCTURE: CPT | Performed by: FAMILY MEDICINE

## 2018-09-04 PROCEDURE — 80061 LIPID PANEL: CPT | Performed by: FAMILY MEDICINE

## 2018-09-04 PROCEDURE — 99214 OFFICE O/P EST MOD 30 MIN: CPT | Performed by: FAMILY MEDICINE

## 2018-09-04 PROCEDURE — G0463 HOSPITAL OUTPT CLINIC VISIT: HCPCS

## 2018-09-04 PROCEDURE — 80053 COMPREHEN METABOLIC PANEL: CPT | Performed by: FAMILY MEDICINE

## 2018-09-04 RX ORDER — ZOLEDRONIC ACID 5 MG/100ML
5 INJECTION, SOLUTION INTRAVENOUS ONCE
Status: CANCELLED
Start: 2018-09-04 | End: 2018-09-04

## 2018-09-04 RX ORDER — MULTIVIT-MIN/IRON/FOLIC ACID/K 18-600-40
1 CAPSULE ORAL DAILY
COMMUNITY

## 2018-09-04 ASSESSMENT — PAIN SCALES - GENERAL: PAINLEVEL: NO PAIN (0)

## 2018-09-04 NOTE — MR AVS SNAPSHOT
"              After Visit Summary   9/4/2018    Yanci Luther    MRN: [id number]           Patient Information     Date Of Birth          1945        Visit Information        Provider Department      9/4/2018 8:30 AM Janes Gunter MD [facility]        Today's Diagnoses     Senile osteoporosis    -  1    Encounter for screening mammogram for breast cancer        Encounter for screening mammogram for malignant neoplasm of breast            Follow-ups after your visit        Future tests that were ordered for you today     Open Future Orders        Priority Expected Expires Ordered    MA Screening Digital Bilateral Routine  9/4/2019 9/4/2018    DX Hip/Pelvis/Spine Routine  9/4/2019 9/4/2018            Who to contact     If you have questions or need follow up information about today's clinic visit or your schedule please contact [facility] directly at [phone].  Normal or non-critical lab and imaging results will be communicated to you by MyChart, letter or phone within 4 business days after the clinic has received the results. If you do not hear from us within 7 days, please contact the clinic through MyChart or phone. If you have a critical or abnormal lab result, we will notify you by phone as soon as possible.  Submit refill requests through [employer] or call your pharmacy and they will forward the refill request to us. Please allow 3 business days for your refill to be completed.          Additional Information About Your Visit        Care EveryWhere ID     This is your Care EveryWhere ID. This could be used by other organizations to access your [facility] medical records  [id number]        Your Vitals Were     Pulse Height BMI (Body Mass Index)             72 5' 4.17\" (1.63 m) 21.58 kg/m2          Blood Pressure from Last 3 Encounters:   09/04/18 124/82   08/31/17 144/74   08/06/15 138/76    Weight from Last 3 Encounters:   09/04/18 126 lb 6.4 oz (57.3 kg) "   08/31/17 117 lb 8 oz (53.3 kg)   08/06/15 149 lb (67.6 kg)               Primary Care Provider Office Phone # Fax #    Janes Gunter [phone] [address]  [place] [address]        Equal Access to Services     [facility] EARLE : Hadii aad ku hadasho Soomaali, waaxda luqadaha, qaybta kaalmada adeegyada, waxdequan cantuin hayaan aderadha bermeochristiannesj anaya . So [facility] [phone].    ATENCIÓN: Si habla español, tiene a berger disposición servicios gratuitos de asistencia lingüística. Llame al [phone].    We comply with applicable federal civil rights laws and [place] laws. We do not discriminate on the basis of race, color, national origin, age, disability, sex, sexual orientation, or gender identity.            Thank you!     Thank you for choosing [facility] AND [facility]  for your care. Our goal is always to provide you with excellent care. Hearing back from our patients is one way we can continue to improve our services. Please take a few minutes to complete the written survey that you may receive in the mail after your visit with us. Thank you!             Your Updated Medication List - Protect others around you: Learn how to safely use, store and throw away your medicines at www.disposemymeds.org.          This list is accurate as of 9/4/18  9:01 AM.  Always use your most recent med list.                   Brand Name Dispense Instructions for use Diagnosis    calcium-magnesium 500-250 MG Tabs per tablet    CALMAG     Take 1 tablet by mouth 2 times daily        vitamin D 2000 units Caps      Take 1 capsule by mouth daily

## 2018-09-04 NOTE — PROGRESS NOTES
SUBJECTIVE:  Yanci Luther is a 73 year old female here for annual exam.  She has a history of osteoporosis and continues on Reclast infusions once a year.  She has tolerated this well.  Her last DEXA scan was in 2013.    She got  over the winter.    She otherwise has no new concerns.      Patient Active Problem List    Diagnosis Date Noted     BPPV (benign paroxysmal positional vertigo) 08/31/2017     Priority: Medium     Cerebrovascular small vessel disease 08/31/2017     Priority: Medium     Mixed hyperlipidemia 08/31/2017     Priority: Medium     Refusal of aspirin by patient 08/31/2017     Priority: Medium     Refusal of statin medication by patient 08/31/2017     Priority: Medium     Obstructive lung disease (H) 09/15/2014     Priority: Medium     Personal history of malignant neoplasm of tongue 05/27/2010     Priority: Medium     Seborrheic keratosis 05/27/2010     Priority: Medium     Senile osteoporosis 09/01/2009     Priority: Medium       Past Medical History:   Diagnosis Date     Age-related osteoporosis without current pathological fracture     No Comments Provided     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 Vitale     ARTHROTOMY WRIST      2011,L radius/ulna fracture, s/p plating       Current Outpatient Prescriptions   Medication Sig Dispense Refill     calcium-magnesium (CALMAG) 500-250 MG TABS per tablet Take 1 tablet by mouth 2 times daily       Cholecalciferol (VITAMIN D) 2000 units CAPS Take 1 capsule by mouth daily         Allergies:  Allergies   Allergen Reactions     Aspirin Other (See Comments)     -- Refuses as of 8/31/2017  Other reaction(s): Other - Describe In Comment Field  -- Refuses as of 8/31/2017     Hmg-Coa-R Inhibitors Other (See Comments)     -- Refuses  "as of 8/31/2017  Other reaction(s): Other - Describe In Comment Field  -- Refuses as of 8/31/2017       Family History   Problem Relation Age of Onset     Adopted: Yes     Genetic Disorder Other      Genetic,Adopted     Breast Cancer No family hx of      Cancer-breast       Social History   Substance Use Topics     Smoking status: Former Smoker     Smokeless tobacco: Never Used     Alcohol use Yes       ROS:    As above otherwise ROS is unremarkable.      OBJECTIVE:  /82  Pulse 72  Ht 5' 4.17\" (1.63 m)  Wt 126 lb 6.4 oz (57.3 kg)  BMI 21.58 kg/m2    EXAM:  General Appearance: Pleasant, alert, appropriate appearance for age. No acute distress  Head: Normal. Normocephalic, atraumatic.  Eyes: PERRL, EOMI  Ears: Normal TM's bilaterally. Normal auditory canals and external ears.   OroPharynx: Dental hygiene adequate. Normal buccal mucosa. Normal pharynx.  Neck: Supple, no masses or nodes, no lymphadenopathy.  No thyromegaly.  Lungs: Normal chest wall and respirations. Clear to auscultation, no wheezes or crackles.  Cardiovascular: Regular rate and rhythm. S1, S2, no murmurs.  Gastrointestinal: Soft, nontender, no abnormal masses or organomegaly. BS normal.  Musculoskeletal: No edema.  Skin: no concerning or new rashes.  Neurologic Exam: CN 2-12 grossly intact.  Normal gait.  Symmetric DTRs, No focal motor or sensory deficits. No tremor.  Psychiatric Exam: Alert and oriented, appropriate affect.    ASSESSEMENT AND PLAN:    1. Senile osteoporosis    2. Encounter for screening mammogram for breast cancer    3. Encounter for screening mammogram for malignant neoplasm of breast       We will arrange for Reclast infusion and update her DEXA scan.    We will refer for mammography.    We reviewed her labs today which are unremarkable.    We reviewed her immunizations.  She states that she has had her Pneumovax many years ago in [place] and she had her Prevnar here.  She is otherwise up-to-date.    Ravin Gunter, " MD  Family Medicine      This document was prepared using voice generated software.  While every attempt was made for accuracy, grammatical errors may exist.

## 2018-09-11 ENCOUNTER — HOSPITAL ENCOUNTER (OUTPATIENT)
Dept: MAMMOGRAPHY | Facility: OTHER | Age: 73
End: 2018-09-11
Attending: FAMILY MEDICINE
Payer: MEDICARE

## 2018-09-11 ENCOUNTER — HOSPITAL ENCOUNTER (OUTPATIENT)
Dept: BONE DENSITY | Facility: OTHER | Age: 73
Discharge: HOME | End: 2018-09-11
Attending: FAMILY MEDICINE | Admitting: FAMILY MEDICINE
Payer: MEDICARE

## 2018-09-11 ENCOUNTER — HOSPITAL ENCOUNTER (OUTPATIENT)
Dept: INFUSION THERAPY | Facility: OTHER | Age: 73
End: 2018-09-11
Attending: FAMILY MEDICINE
Payer: MEDICARE

## 2018-09-11 VITALS
DIASTOLIC BLOOD PRESSURE: 80 MMHG | WEIGHT: 128 LBS | RESPIRATION RATE: 16 BRPM | SYSTOLIC BLOOD PRESSURE: 138 MMHG | HEART RATE: 68 BPM | TEMPERATURE: 98.2 F | BODY MASS INDEX: 21.85 KG/M2

## 2018-09-11 DIAGNOSIS — M81.0 SENILE OSTEOPOROSIS: ICD-10-CM

## 2018-09-11 DIAGNOSIS — Z12.31 ENCOUNTER FOR SCREENING MAMMOGRAM FOR MALIGNANT NEOPLASM OF BREAST: ICD-10-CM

## 2018-09-11 PROCEDURE — 77080 DXA BONE DENSITY AXIAL: CPT

## 2018-09-11 PROCEDURE — 96365 THER/PROPH/DIAG IV INF INIT: CPT

## 2018-09-11 PROCEDURE — 77067 SCR MAMMO BI INCL CAD: CPT

## 2018-09-11 PROCEDURE — 25000128 H RX IP 250 OP 636: Performed by: FAMILY MEDICINE

## 2018-09-11 RX ORDER — ZOLEDRONIC ACID 5 MG/100ML
5 INJECTION, SOLUTION INTRAVENOUS ONCE
Status: CANCELLED
Start: 2018-09-11 | End: 2018-09-11

## 2018-09-11 RX ORDER — ZOLEDRONIC ACID 5 MG/100ML
5 INJECTION, SOLUTION INTRAVENOUS ONCE
Status: COMPLETED | OUTPATIENT
Start: 2018-09-11 | End: 2018-09-11

## 2018-09-11 RX ADMIN — SODIUM CHLORIDE 250 ML: 900 INJECTION, SOLUTION INTRAVENOUS at 13:13

## 2018-09-11 RX ADMIN — ZOLEDRONIC ACID 5 MG: 5 INJECTION, SOLUTION INTRAVENOUS at 13:13

## 2018-09-11 NOTE — PROGRESS NOTES
Infusion Nursing Note:  Yanci Luther presents today for Reclast, dose # 2.    Patient seen by provider today: No   present during visit today: Not Applicable.    Note: Patient verbalized that she tolerated the first dose    Intravenous Access:  Peripheral IV placed to left antecubital space    Treatment Conditions:  Results reviewed, labs MET treatment parameters, ok to proceed with treatment.      Post Infusion Assessment:  Patient tolerated infusion without incident.  Site patent and intact, free from redness, edema or discomfort.  No evidence of extravasations.  Access discontinued per protocol.    Discharge Plan:   Patient discharged in stable condition accompanied by: self.    Zehra Pope RN

## 2019-07-08 ENCOUNTER — TELEPHONE (OUTPATIENT)
Dept: FAMILY MEDICINE | Facility: OTHER | Age: 74
End: 2019-07-08

## 2019-07-08 ENCOUNTER — OFFICE VISIT (OUTPATIENT)
Dept: FAMILY MEDICINE | Facility: OTHER | Age: 74
End: 2019-07-08
Attending: FAMILY MEDICINE
Payer: COMMERCIAL

## 2019-07-08 ENCOUNTER — TELEPHONE (OUTPATIENT)
Dept: SURGERY | Facility: OTHER | Age: 74
End: 2019-07-08

## 2019-07-08 VITALS
OXYGEN SATURATION: 96 % | RESPIRATION RATE: 18 BRPM | BODY MASS INDEX: 23.15 KG/M2 | TEMPERATURE: 98 F | DIASTOLIC BLOOD PRESSURE: 80 MMHG | HEIGHT: 64 IN | WEIGHT: 135.6 LBS | HEART RATE: 68 BPM | SYSTOLIC BLOOD PRESSURE: 120 MMHG

## 2019-07-08 DIAGNOSIS — R19.7 DIARRHEA, UNSPECIFIED TYPE: ICD-10-CM

## 2019-07-08 DIAGNOSIS — M81.0 SENILE OSTEOPOROSIS: Primary | ICD-10-CM

## 2019-07-08 DIAGNOSIS — L82.1 SEBORRHEIC KERATOSIS: Primary | ICD-10-CM

## 2019-07-08 PROCEDURE — G0463 HOSPITAL OUTPT CLINIC VISIT: HCPCS

## 2019-07-08 PROCEDURE — G0463 HOSPITAL OUTPT CLINIC VISIT: HCPCS | Mod: 25

## 2019-07-08 PROCEDURE — 99213 OFFICE O/P EST LOW 20 MIN: CPT | Mod: 25 | Performed by: FAMILY MEDICINE

## 2019-07-08 PROCEDURE — 17110 DESTRUCTION B9 LES UP TO 14: CPT | Performed by: FAMILY MEDICINE

## 2019-07-08 RX ORDER — ZOLEDRONIC ACID 5 MG/100ML
5 INJECTION, SOLUTION INTRAVENOUS ONCE
Status: CANCELLED
Start: 2019-09-11

## 2019-07-08 ASSESSMENT — PAIN SCALES - GENERAL: PAINLEVEL: NO PAIN (0)

## 2019-07-08 ASSESSMENT — MIFFLIN-ST. JEOR: SCORE: 1097.78

## 2019-07-08 NOTE — PROGRESS NOTES
"SUBJECTIVE:  Yanci Luhter is a 74 year old female here for 2 concerns.  First of all she has several skin lesions on his treated skin cancer.  These do not seem to bother her much.  She does feel like one is getting larger with her bra strap.    She has had several months of loose stools.  She has not any blood or pus in her stools.  No travel.  She has various supplies of water.  No fevers or chills.  Normal colonoscopy in 2009.    Allergies:  Allergies   Allergen Reactions     Aspirin Other (See Comments)     -- Refuses as of 8/31/2017  Other reaction(s): Other - Describe In Comment Field  -- Refuses as of 8/31/2017     Hmg-Coa-R Inhibitors Other (See Comments)     -- Refuses as of 8/31/2017  Other reaction(s): Other - Describe In Comment Field  -- Refuses as of 8/31/2017       ROS:    As above otherwise ROS is unremarkable.    OBJECTIVE:  /80   Pulse 68   Temp 98  F (36.7  C)   Resp 18   Ht 1.63 m (5' 4.17\")   Wt 61.5 kg (135 lb 9.6 oz)   SpO2 96%   BMI 23.15 kg/m      EXAM:  General Appearance: Pleasant, alert, appropriate appearance for age. No acute distress  Gastrointestinal: Soft, nontender, no abnormal masses or organomegaly. BS normal.  Skin: 1 seborrheic keratoses are seen on her back ranging from 1 to 2 cm.  Borders are well demarcated.    ASSESSEMENT AND PLAN:    1. Seborrheic keratosis    2. Diarrhea, unspecified type      5 seborrheic keratosis were treated with liquid nitrogen for 3 freeze thaw cycles lasting 2 to 3 seconds each.  She tolerated this well.  She will follow-up in 1 month for repeat treatment if necessary.  Discussed dermatology referral which they declined today.    For diarrhea she is due for colonoscopy anyways, given her duration of symptoms will refer for diagnostic colonoscopy.  If this is unremarkable we can discuss stool studies and symptom management.    Ravin Gunter MD    This document was prepared using voice generated software.  While every attempt was made " for accuracy, grammatical errors may exist.

## 2019-07-08 NOTE — TELEPHONE ENCOUNTER
This patient wants to schedule a Reclast after 9/12/19  The order in her chart appears to be from last year and expires on 9/11/19.  Do you wish to place a new one?  Please advise.

## 2019-07-08 NOTE — TELEPHONE ENCOUNTER
Patient referred by Dr. Gunter for a diagnostic colonoscopy ,   Diagnosis is diarrhea.   Please advise.  Thank you. Sybil Guillermo on 7/8/2019 at 1:11 PM

## 2019-07-08 NOTE — NURSING NOTE
Patient presents today for concerning spot on her back. Patient is looking for referral for dermatology.   Medication Reconciliation Complete    Marian March LPN  7/8/2019 11:29 AM

## 2019-07-15 ENCOUNTER — TELEPHONE (OUTPATIENT)
Dept: FAMILY MEDICINE | Facility: OTHER | Age: 74
End: 2019-07-15

## 2019-07-15 NOTE — TELEPHONE ENCOUNTER
Patient has been called and messages left on 07/10 , 07/11, and 07/12 for him to call back to schedule a colonoscopy .  Letter has been sent out today for him to call and schedule .  Sybil Guillermo on 7/15/2019 at 10:25 AM

## 2019-07-29 DIAGNOSIS — Z00.00 HEALTHCARE MAINTENANCE: Primary | ICD-10-CM

## 2019-07-29 NOTE — TELEPHONE ENCOUNTER
Screening Questions for the Scheduling of Screening Colonoscopies   (If Colonoscopy is diagnostic, Provider should review the chart before scheduling.)  Are you younger than 50 or older than 80?  NO  Do you take aspirin or fish oil?  YES - FISH OIL (if yes, tell patient to stop 1 week prior to Colonoscopy)  Do you take warfarin (Coumadin), clopidogrel (Plavix), apixaban (Eliquis), dabigatram (Pradaxa), rivaroxaban (Xarelto) or any blood thinner? NO  Do you use oxygen at home?  NO  Do you have kidney disease? NO  Are you on dialysis? NO  Have you had a stroke or heart attack in the last year? NO  Have you had a stent in your heart or any blood vessel in the last year? NO  Have you had a transplant of any organ? NO  Have you had a colonoscopy or upper endoscopy (EGD) before? YES         When?  ?  [facility]  Date of scheduled Colonoscopy. 9/11/2019  Provider [facility]  [facility]

## 2019-07-30 RX ORDER — BISACODYL 5 MG
TABLET, DELAYED RELEASE (ENTERIC COATED) ORAL
Qty: 2 TABLET | Refills: 0 | Status: SHIPPED | OUTPATIENT
Start: 2019-07-30 | End: 2019-09-11

## 2019-07-30 RX ORDER — POLYETHYLENE GLYCOL 3350, SODIUM CHLORIDE, SODIUM BICARBONATE, POTASSIUM CHLORIDE 420; 11.2; 5.72; 1.48 G/4L; G/4L; G/4L; G/4L
4000 POWDER, FOR SOLUTION ORAL ONCE
Qty: 4000 ML | Refills: 0 | Status: SHIPPED | OUTPATIENT
Start: 2019-07-30 | End: 2019-09-11

## 2019-08-09 ENCOUNTER — OFFICE VISIT (OUTPATIENT)
Dept: FAMILY MEDICINE | Facility: OTHER | Age: 74
End: 2019-08-09
Attending: FAMILY MEDICINE
Payer: COMMERCIAL

## 2019-08-09 VITALS
DIASTOLIC BLOOD PRESSURE: 80 MMHG | WEIGHT: 136.8 LBS | RESPIRATION RATE: 16 BRPM | HEIGHT: 64 IN | SYSTOLIC BLOOD PRESSURE: 134 MMHG | BODY MASS INDEX: 23.35 KG/M2 | TEMPERATURE: 98 F | HEART RATE: 64 BPM

## 2019-08-09 DIAGNOSIS — L82.1 SEBORRHEIC KERATOSIS: ICD-10-CM

## 2019-08-09 DIAGNOSIS — E78.2 MIXED HYPERLIPIDEMIA: Primary | ICD-10-CM

## 2019-08-09 PROCEDURE — G0463 HOSPITAL OUTPT CLINIC VISIT: HCPCS

## 2019-08-09 PROCEDURE — 99213 OFFICE O/P EST LOW 20 MIN: CPT | Performed by: FAMILY MEDICINE

## 2019-08-09 ASSESSMENT — PAIN SCALES - GENERAL: PAINLEVEL: NO PAIN (0)

## 2019-08-09 ASSESSMENT — MIFFLIN-ST. JEOR: SCORE: 1103.22

## 2019-08-09 NOTE — NURSING NOTE
Patient presents today for follow up on skin tag.   Medication Reconciliation Complete    Marian March LPN  8/9/2019 12:49 PM

## 2019-08-09 NOTE — PROGRESS NOTES
"SUBJECTIVE:  Yanci Luther is a 74 year old female here for follow-up.  She was seen a month ago where she had seborrheic keratosis treated with liquid nitrogen.  She had her last lesion follow-up for this morning.  She is otherwise doing well.    She is due for fasting labs.    ROS:    As above otherwise ROS is unremarkable.    OBJECTIVE:  /80   Pulse 64   Temp 98  F (36.7  C)   Resp 16   Ht 1.63 m (5' 4.17\")   Wt 62.1 kg (136 lb 12.8 oz)   BMI 23.36 kg/m      EXAM:  General Appearance: Pleasant, alert, appropriate appearance for age. No acute distress  Skin: Area was seborrheic keratosis treated has healed well.    ASSESSEMENT AND PLAN:    1. Mixed hyperlipidemia    2. Seborrheic keratosis      Reviewed 2018 fasting labs which show significantly elevated HDL driving up her total cholesterol.  She will follow-up sometime in September for fasting labs.    Ravin Gunter MD    This document was prepared using voice generated software.  While every attempt was made for accuracy, grammatical errors may exist.  "

## 2019-09-11 ENCOUNTER — ANESTHESIA EVENT (OUTPATIENT)
Dept: SURGERY | Facility: OTHER | Age: 74
End: 2019-09-11
Payer: COMMERCIAL

## 2019-09-11 ENCOUNTER — ANESTHESIA (OUTPATIENT)
Dept: SURGERY | Facility: OTHER | Age: 74
End: 2019-09-11
Payer: COMMERCIAL

## 2019-09-11 ENCOUNTER — HOSPITAL ENCOUNTER (OUTPATIENT)
Facility: OTHER | Age: 74
Discharge: HOME | End: 2019-09-11
Attending: SURGERY | Admitting: SURGERY
Payer: COMMERCIAL

## 2019-09-11 VITALS
OXYGEN SATURATION: 97 % | DIASTOLIC BLOOD PRESSURE: 77 MMHG | HEART RATE: 52 BPM | SYSTOLIC BLOOD PRESSURE: 119 MMHG | TEMPERATURE: 97.8 F | RESPIRATION RATE: 16 BRPM

## 2019-09-11 DIAGNOSIS — K63.5 POLYP OF COLON, UNSPECIFIED PART OF COLON, UNSPECIFIED TYPE: Primary | ICD-10-CM

## 2019-09-11 DIAGNOSIS — K64.8 INTERNAL HEMORRHOIDS: ICD-10-CM

## 2019-09-11 DIAGNOSIS — K57.90 DIVERTICULOSIS: ICD-10-CM

## 2019-09-11 DIAGNOSIS — K64.4 EXTERNAL HEMORRHOIDS: ICD-10-CM

## 2019-09-11 PROCEDURE — 45380 COLONOSCOPY AND BIOPSY: CPT | Mod: PT,XU

## 2019-09-11 PROCEDURE — 45385 COLONOSCOPY W/LESION REMOVAL: CPT | Mod: PT | Performed by: SURGERY

## 2019-09-11 PROCEDURE — 99100 ANES PT EXTEME AGE<1 YR&>70: CPT | Performed by: NURSE ANESTHETIST, CERTIFIED REGISTERED

## 2019-09-11 PROCEDURE — 40000010 ZZH STATISTIC ANES STAT CODE-CRNA PER MINUTE: Performed by: SURGERY

## 2019-09-11 PROCEDURE — 45384 COLONOSCOPY W/LESION REMOVAL: CPT | Performed by: SURGERY

## 2019-09-11 PROCEDURE — 25000132 ZZH RX MED GY IP 250 OP 250 PS 637: Performed by: SURGERY

## 2019-09-11 PROCEDURE — 45385 COLONOSCOPY W/LESION REMOVAL: CPT | Performed by: NURSE ANESTHETIST, CERTIFIED REGISTERED

## 2019-09-11 PROCEDURE — 25000125 ZZHC RX 250: Performed by: SURGERY

## 2019-09-11 PROCEDURE — 25000125 ZZHC RX 250: Performed by: NURSE ANESTHETIST, CERTIFIED REGISTERED

## 2019-09-11 PROCEDURE — 45380 COLONOSCOPY AND BIOPSY: CPT | Mod: PT | Performed by: SURGERY

## 2019-09-11 PROCEDURE — 25000128 H RX IP 250 OP 636: Performed by: NURSE ANESTHETIST, CERTIFIED REGISTERED

## 2019-09-11 PROCEDURE — 25800030 ZZH RX IP 258 OP 636: Performed by: SURGERY

## 2019-09-11 PROCEDURE — 88305 TISSUE EXAM BY PATHOLOGIST: CPT

## 2019-09-11 RX ORDER — PROPOFOL 10 MG/ML
INJECTION, EMULSION INTRAVENOUS PRN
Status: DISCONTINUED | OUTPATIENT
Start: 2019-09-11 | End: 2019-09-11

## 2019-09-11 RX ORDER — SODIUM CHLORIDE, SODIUM LACTATE, POTASSIUM CHLORIDE, CALCIUM CHLORIDE 600; 310; 30; 20 MG/100ML; MG/100ML; MG/100ML; MG/100ML
INJECTION, SOLUTION INTRAVENOUS CONTINUOUS
Status: DISCONTINUED | OUTPATIENT
Start: 2019-09-11 | End: 2019-09-11 | Stop reason: HOSPADM

## 2019-09-11 RX ORDER — PROPOFOL 10 MG/ML
INJECTION, EMULSION INTRAVENOUS CONTINUOUS PRN
Status: DISCONTINUED | OUTPATIENT
Start: 2019-09-11 | End: 2019-09-11

## 2019-09-11 RX ORDER — LIDOCAINE HYDROCHLORIDE 20 MG/ML
INJECTION, SOLUTION INFILTRATION; PERINEURAL PRN
Status: DISCONTINUED | OUTPATIENT
Start: 2019-09-11 | End: 2019-09-11

## 2019-09-11 RX ORDER — LIDOCAINE 40 MG/G
CREAM TOPICAL
Status: DISCONTINUED | OUTPATIENT
Start: 2019-09-11 | End: 2019-09-11 | Stop reason: HOSPADM

## 2019-09-11 RX ORDER — NALOXONE HYDROCHLORIDE 0.4 MG/ML
.1-.4 INJECTION, SOLUTION INTRAMUSCULAR; INTRAVENOUS; SUBCUTANEOUS
Status: DISCONTINUED | OUTPATIENT
Start: 2019-09-11 | End: 2019-09-11 | Stop reason: HOSPADM

## 2019-09-11 RX ORDER — FLUMAZENIL 0.1 MG/ML
0.2 INJECTION, SOLUTION INTRAVENOUS
Status: DISCONTINUED | OUTPATIENT
Start: 2019-09-11 | End: 2019-09-11 | Stop reason: HOSPADM

## 2019-09-11 RX ORDER — SIMETHICONE
LIQUID (ML) MISCELLANEOUS PRN
Status: DISCONTINUED | OUTPATIENT
Start: 2019-09-11 | End: 2019-09-11 | Stop reason: HOSPADM

## 2019-09-11 RX ADMIN — SODIUM CHLORIDE, POTASSIUM CHLORIDE, SODIUM LACTATE AND CALCIUM CHLORIDE: 600; 310; 30; 20 INJECTION, SOLUTION INTRAVENOUS at 07:20

## 2019-09-11 RX ADMIN — PROPOFOL 60 MG: 10 INJECTION, EMULSION INTRAVENOUS at 07:34

## 2019-09-11 RX ADMIN — PROPOFOL 130 MCG/KG/MIN: 10 INJECTION, EMULSION INTRAVENOUS at 07:34

## 2019-09-11 RX ADMIN — LIDOCAINE HYDROCHLORIDE 60 MG: 20 INJECTION, SOLUTION INFILTRATION; PERINEURAL at 07:34

## 2019-09-11 ASSESSMENT — COPD QUESTIONNAIRES
COPD: 1
CAT_SEVERITY: MILD

## 2019-09-11 ASSESSMENT — LIFESTYLE VARIABLES: TOBACCO_USE: 1

## 2019-09-11 NOTE — OP NOTE
PROCEDURE NOTE    DATE OF SERVICE: 9/11/2019    SURGEON: ALEXANDER Paez MD     PRE-OP DIAGNOSIS:    Healthcare maintenance     POST-OP DIAGNOSIS:    Internal hemorrhoids  External hemorrhoids  Sigmoid Diverticulosis  Polyps at hepatic flexure, transverso colon, rectum    PROCEDURE:   Colonoscopy with snare polypectomy    ASSISTANT:  Circulator: Helen Carrion RN  Relief Circulator: Abby Linares RN  Scrub Person: Em Aleman  Pre-Op Nurse: Martha Crowe RN    ANESTHESIA:  MAC                            Monitor Anesthesia CareCRNA Independent: Freida Galloway APRN CRNA    INDICATION FOR THE PROCEDURE: The patient is a 74 year old female in need of screening colonoscopy, average risk. The patient has no other complaints.  After explaining the risks to include bleeding, perforation, potential inability to reach the cecum the patient wishes to proceed.    PROCEDURE:After adequate sedation, the patient was in the left lateral decubitus position.  Rectal exam was performed.  There was normal tone and no palpable masses, external hemorrhoids noted.  The colonoscope was introduced into the rectum and advanced to the cecum with Mild difficulty.  The patient's prep was excellent.  The terminal cecum was reached.  The cecum, ascending, transverse, descending and sigmoid colon were significant for small polyp in the hepatic flexure removed with cold forceps, area of mucosal irregularity at the hepatic flexure biopsied with cold forceps and hot snare, small polyp at the transverse colon removed with cold forceps, and small polyp in the proximal rectum removed with cold snare. Very mild sigmoid diverticulosis also present. The scope was retroflexed in the rectum.  The rectum was notable for mild, gr 1-2, internal hemorrhoids.  The scope was straightened and removed.  The patient tolerated the procedure well.     ESTIMATED BLOOD LOSS: none    COMPLICATIONS:  None    TISSUE REMOVED:  Yes    RECOMMEND:     Follow-up pending pathology  Fiber  Given literature on diverticulosis      ALEXANDER Paez MD

## 2019-09-11 NOTE — ANESTHESIA POSTPROCEDURE EVALUATION
Patient: Yanci Luther    Procedure(s):  COLONOSCOPY, WITH LESION EXCISION USING HOT BIOPSY DEVICE    Diagnosis:diarrhea, unspecified type  Diagnosis Additional Information: No value filed.    Anesthesia Type:  MAC    Note:  Anesthesia Post Evaluation    Patient location during evaluation: Phase 2  Patient participation: Able to fully participate in evaluation  Level of consciousness: awake and alert  Pain management: adequate  Airway patency: patent  Cardiovascular status: acceptable  Respiratory status: acceptable  Hydration status: acceptable  PONV: none     Anesthetic complications: None          Last vitals:  Vitals:    09/11/19 0822 09/11/19 0830 09/11/19 0845   BP: 116/52 119/77    Pulse:  54 52   Resp: 16     Temp: 97.8  F (36.6  C)     SpO2: 96% 95% 97%         Electronically Signed By: AARON DSOUZA CRNA  September 11, 2019  9:31 AM

## 2019-09-11 NOTE — ANESTHESIA PREPROCEDURE EVALUATION
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 Vitale     ARTHROTOMY WRIST      ,L radius/ulna fracture, s/p plating     COLONOSCOPY  2009    follow up 10 years, 19       Anesthesia Evaluation     . Pt has had prior anesthetic.     No history of anesthetic complications          ROS/MED HX    ENT/Pulmonary:     (+)tobacco use, Past use mild COPD, , . .    Neurologic:  - neg neurologic ROS     Cardiovascular: Comment: Cerebral vascular disease    (+) Dyslipidemia, -Peripheral Vascular Disease---. : . . . :. .       METS/Exercise Tolerance:     Hematologic:         Musculoskeletal: Comment: osteoporosis  (+) arthritis,  -       GI/Hepatic:     (+) bowel prep,       Renal/Genitourinary:  - ROS Renal section negative       Endo:  - neg endo ROS       Psychiatric:  - neg psychiatric ROS       Infectious Disease:  - neg infectious disease ROS       Malignancy:   (+) Malignancy History of Other  Other CA Remission status post tongue        Other:                          Physical Exam  Normal systems: cardiovascular and pulmonary    Airway   Mallampati: II  TM distance: >3 FB  Neck ROM: full    Dental   (+) upper dentures and lower dentures    Cardiovascular   Rhythm and rate: regular and normal      Pulmonary    breath sounds clear to auscultation            Lab Results   Component Value Date    HGB 12.7 2013    HCT 38.8 2013     2013     2018  "   POTASSIUM 4.0 09/04/2018    CHLORIDE 105 09/04/2018    CO2 30 09/04/2018    BUN 17 09/04/2018    CR 0.67 09/04/2018    GLC 89 09/04/2018    FARSHAD 10.6 (H) 09/04/2018    ALBUMIN 4.6 09/04/2018    PROTTOTAL 7.7 09/04/2018    ALT 18 09/04/2018    AST 21 09/04/2018    ALKPHOS 19 (L) 09/04/2018    BILITOTAL 0.7 09/04/2018       Preop Vitals  BP Readings from Last 3 Encounters:   09/11/19 133/86   08/09/19 134/80   07/08/19 120/80    Pulse Readings from Last 3 Encounters:   08/09/19 64   07/08/19 68   09/11/18 68      Resp Readings from Last 3 Encounters:   09/11/19 16   08/09/19 16   07/08/19 18    SpO2 Readings from Last 3 Encounters:   09/11/19 95%   07/08/19 96%   09/08/14 95%      Temp Readings from Last 1 Encounters:   09/11/19 98.5  F (36.9  C) (Tympanic)    Ht Readings from Last 1 Encounters:   08/09/19 1.63 m (5' 4.17\")      Wt Readings from Last 1 Encounters:   08/09/19 62.1 kg (136 lb 12.8 oz)    Estimated body mass index is 23.36 kg/m  as calculated from the following:    Height as of 8/9/19: 1.63 m (5' 4.17\").    Weight as of 8/9/19: 62.1 kg (136 lb 12.8 oz).       Anesthesia Plan      History & Physical Review      ASA Status:  3 .    NPO Status:  > 8 hours    Plan for MAC          Postoperative Care      Consents  Anesthetic plan, risks, benefits and alternatives discussed with:  Patient.  Use of blood products discussed: Yes.   Use of blood products discussed with Patient.  Consented to blood products.  .                 AARON Sprague CRNA  "

## 2019-09-11 NOTE — ANESTHESIA CARE TRANSFER NOTE
Patient: Yanci Luther    Procedure(s):  COLONOSCOPY, WITH POLYPECTOMY AND BIOPSY    Diagnosis: diarrhea, unspecified type  Diagnosis Additional Information: No value filed.    Anesthesia Type:   MAC     Note:  Airway :Room Air  Patient transferred to:Phase II  Handoff Report: Identifed the Patient, Identified the Reponsible Provider, Reviewed the pertinent medical history, Discussed the surgical course, Reviewed Intra-OP anesthesia mangement and issues during anesthesia, Set expectations for post-procedure period and Allowed opportunity for questions and acknowledgement of understanding      Vitals: (Last set prior to Anesthesia Care Transfer)    CRNA VITALS  9/11/2019 0751 - 9/11/2019 0823      9/11/2019             Resp Rate (set):  10                Electronically Signed By: AARON DSOUZA CRNA  September 11, 2019  8:23 AM

## 2019-09-11 NOTE — DISCHARGE INSTRUCTIONS
[facility]  Adult Discharge Orders & Instructions    ________________________________________________________________          For 12 hours after surgery  1. Get plenty of rest.  A responsible adult must stay with you for at least 12 hours after you leave the hospital.   2. You may feel lightheaded.  IF so, sit for a few minutes before standing.  Have someone help you get up.   3. You may have a slight fever. Call the doctor if your fever is over 101 F (38.3 C) (taken under the tongue) or lasts longer than 24 hours.  4. You may have a dry mouth, a sore throat, muscle aches or trouble sleeping.  These should go away after 24 hours.  5. Do not make important or legal decisions.  6.   Do not drive or use heavy equipment.  If you have weakness or tingling, don't drive or use heavy equipment until this feeling goes away.    To contact a doctor, call   [phone]_______________________

## 2019-09-12 ENCOUNTER — HOSPITAL ENCOUNTER (OUTPATIENT)
Dept: INFUSION THERAPY | Facility: OTHER | Age: 74
Discharge: HOME | End: 2019-09-12
Attending: FAMILY MEDICINE | Admitting: FAMILY MEDICINE
Payer: COMMERCIAL

## 2019-09-12 VITALS
TEMPERATURE: 96.1 F | HEIGHT: 64 IN | WEIGHT: 138.6 LBS | SYSTOLIC BLOOD PRESSURE: 168 MMHG | DIASTOLIC BLOOD PRESSURE: 96 MMHG | HEART RATE: 57 BPM | RESPIRATION RATE: 16 BRPM | BODY MASS INDEX: 23.66 KG/M2

## 2019-09-12 DIAGNOSIS — M81.0 SENILE OSTEOPOROSIS: Primary | ICD-10-CM

## 2019-09-12 LAB
CALCIUM SERPL-MCNC: 10.6 MG/DL (ref 8.6–10.3)
CREAT SERPL-MCNC: 0.64 MG/DL (ref 0.6–1.2)
GFR SERPL CREATININE-BSD FRML MDRD: >90 ML/MIN/{1.73_M2}

## 2019-09-12 PROCEDURE — 82310 ASSAY OF CALCIUM: CPT | Performed by: FAMILY MEDICINE

## 2019-09-12 PROCEDURE — 25000128 H RX IP 250 OP 636: Performed by: FAMILY MEDICINE

## 2019-09-12 PROCEDURE — 36415 COLL VENOUS BLD VENIPUNCTURE: CPT | Performed by: FAMILY MEDICINE

## 2019-09-12 PROCEDURE — 82565 ASSAY OF CREATININE: CPT | Performed by: FAMILY MEDICINE

## 2019-09-12 PROCEDURE — 96365 THER/PROPH/DIAG IV INF INIT: CPT

## 2019-09-12 RX ORDER — ZOLEDRONIC ACID 5 MG/100ML
5 INJECTION, SOLUTION INTRAVENOUS ONCE
Status: COMPLETED | OUTPATIENT
Start: 2019-09-12 | End: 2019-09-12

## 2019-09-12 RX ORDER — ZOLEDRONIC ACID 5 MG/100ML
5 INJECTION, SOLUTION INTRAVENOUS ONCE
Status: CANCELLED
Start: 2019-09-12

## 2019-09-12 RX ADMIN — SODIUM CHLORIDE 250 ML: 9 INJECTION, SOLUTION INTRAVENOUS at 10:56

## 2019-09-12 RX ADMIN — ZOLEDRONIC ACID 5 MG: 5 INJECTION, SOLUTION INTRAVENOUS at 10:57

## 2019-09-12 ASSESSMENT — MIFFLIN-ST. JEOR: SCORE: 1111.44

## 2019-09-12 NOTE — PROGRESS NOTES
Infusion Nursing Note:  Yanci Luther presents today for reclast.    Patient seen by provider today: No   present during visit today: Not Applicable.    Note: N/A.    Intravenous Access:  Labs drawn without difficulty.  Peripheral IV placed.    Treatment Conditions:  Lab Results   Component Value Date     2018                   Lab Results   Component Value Date    POTASSIUM 4.0 2018           No results found for: MAG         Lab Results   Component Value Date    CR 0.64 2019                   Lab Results   Component Value Date    FARSHAD 10.6 2019                Lab Results   Component Value Date    BILITOTAL 0.7 2018           Lab Results   Component Value Date    ALBUMIN 4.6 2018                    Lab Results   Component Value Date    ALT 18 2018           Lab Results   Component Value Date    AST 21 2018           Post Infusion Assessment:  Patient tolerated infusion without incident.  Blood return noted pre and post infusion.  Site patent and intact, free from redness, edema or discomfort.  No evidence of extravasations.  Access discontinued per protocol.       Discharge Plan:   Patient discharged in stable condition accompanied by: self.  Departure Mode: Ambulatory.  Patient declined AVS. Patient was discharged by another infusion RN. Upon placing vitals in chart discovered last elevated BP of 168/96. Called and spoke with patient after verifying name and . Discussed importance of monitoring BP over the next couple weeks and Follow-up with Janes Gunter if it continues to be high and when to come back in to the clinic. Patient in agreement with plan. Patient states BP is usually not high, but will monitor.    Nadia Brito, RN, RN

## 2020-03-04 ENCOUNTER — TELEPHONE (OUTPATIENT)
Dept: FAMILY MEDICINE | Facility: OTHER | Age: 75
End: 2020-03-04

## 2020-03-04 NOTE — TELEPHONE ENCOUNTER
They will need to see a provider in [place] to get this.  I cannot order without seeing and assessing.  Also, I am not confident the order will be sent to the correct place and if I would get the results back.

## 2020-03-04 NOTE — TELEPHONE ENCOUNTER
Called Romaine hunt and he stated that they are down in [facility] and patient has been having left arm pain and weakness and has been seeing a chiropractor for this.  The chiropractor questions if she has a herniated disk in one of her cervical vertebra.  They were told by the chiropractor to call their primary and see if they could order an mri to check her cervical vertebra.  They stated that they could get the MRI through [facility] in [facility].  Please advise if you will order this or if they need to be seen.  Sunita Ron LPN .......3/4/2020 2:33 PM

## 2020-03-10 ENCOUNTER — TRANSFERRED RECORDS (OUTPATIENT)
Dept: HEALTH INFORMATION MANAGEMENT | Facility: OTHER | Age: 75
End: 2020-03-10

## 2020-03-11 ENCOUNTER — HEALTH MAINTENANCE LETTER (OUTPATIENT)
Age: 75
End: 2020-03-11

## 2020-07-10 ENCOUNTER — OFFICE VISIT (OUTPATIENT)
Dept: FAMILY MEDICINE | Facility: OTHER | Age: 75
End: 2020-07-10
Attending: FAMILY MEDICINE
Payer: COMMERCIAL

## 2020-07-10 VITALS
HEART RATE: 72 BPM | HEIGHT: 64 IN | BODY MASS INDEX: 24.34 KG/M2 | WEIGHT: 142.6 LBS | OXYGEN SATURATION: 95 % | TEMPERATURE: 97 F | DIASTOLIC BLOOD PRESSURE: 82 MMHG | SYSTOLIC BLOOD PRESSURE: 146 MMHG | RESPIRATION RATE: 16 BRPM

## 2020-07-10 DIAGNOSIS — R61 NIGHT SWEATS: ICD-10-CM

## 2020-07-10 DIAGNOSIS — Z00.00 ENCOUNTER FOR MEDICARE ANNUAL WELLNESS EXAM: Primary | ICD-10-CM

## 2020-07-10 DIAGNOSIS — Z12.31 ENCOUNTER FOR SCREENING MAMMOGRAM FOR BREAST CANCER: ICD-10-CM

## 2020-07-10 DIAGNOSIS — Z12.11 COLON CANCER SCREENING: ICD-10-CM

## 2020-07-10 DIAGNOSIS — E78.2 MIXED HYPERLIPIDEMIA: ICD-10-CM

## 2020-07-10 PROCEDURE — G0439 PPPS, SUBSEQ VISIT: HCPCS | Performed by: FAMILY MEDICINE

## 2020-07-10 PROCEDURE — G0463 HOSPITAL OUTPT CLINIC VISIT: HCPCS

## 2020-07-10 ASSESSMENT — MIFFLIN-ST. JEOR: SCORE: 1124.53

## 2020-07-10 ASSESSMENT — PAIN SCALES - GENERAL: PAINLEVEL: NO PAIN (0)

## 2020-07-10 NOTE — PATIENT INSTRUCTIONS
Patient Education   Personalized Prevention Plan  You are due for the preventive services outlined below.  Your care team is available to assist you in scheduling these services.  If you have already completed any of these items, please share that information with your care team to update in your medical record.  Health Maintenance Due   Topic Date Due     Breathing Capacity Test  1945     Discuss Advance Care Planning  1945     COPD Action Plan  1945     Diptheria Tetanus Pertussis (DTAP/TDAP/TD) Vaccine (1 - Tdap) 03/30/1969     Zoster (Shingles) Vaccine (1 of 2) 03/30/1994     Annual Wellness Visit  03/30/2009     Pneumococcal Vaccine (2 of 2 - PPSV23) 08/06/2016     Colorectal Cancer Screening  08/06/2019     FALL RISK ASSESSMENT  09/04/2019     PHQ-2  01/01/2020       Preventing Falls in the Home  An adult or child can fall for many reasons. If you are an older adult, you may fall because your reaction time slows down. Your muscles and joints may get stiff, weak, or less flexible because of illness, medicines, or a physical condition. These things can also make a child more likely to fall or be injured in a fall.  Other health problems that make falls more likely include:    Arthritis    Dizziness or lightheadedness when you get out of bed (orthostatic hypotension)    History of a stroke    Dizziness    Anemia    Certain medicines taken for mental illness or to control blood pressure.    Problems with balance or gait    Bladder or urinary problems    History of falls with or without an injury    Changes in vision (vision impairment)    Changes in thinking skills and memory (cognitive impairment)  Injuries from a fall can include broken bones, dislocated joints, internal bleeding and cuts. When these injuries are serious enough, they can make it impossible for you or a child who is injured in a fall to live on his or her ownhome.  Prevention tips  To help prevent falls and fall-related  injuries, follow the tips below.   Floors  Make floors safer by doing the following:     Put nonskid pads under area rugs.    Remove throw rugs.    Replace worn floor coverings.    Tack carpets firmly to each step on carpeted stairs. Put nonskid strips on the edges of uncarpeted stairs.    Keep floors and stairs free of clutter and cords.    Arrange furniture so there are clear pathways.    Clean up any spills right away.    Wear shoes that fit.  Bathrooms    Make bathrooms safer by doing the following:     Install grab bars in the tub or shower.    Apply nonskid strips or put a nonskid rubber mat in the tub or shower.    Sit on a bath chair to bathe.    Use bathmats with nonskid backing.  Lighting and the environment  Improve lighting in your home by doing the following:     Keep a flashlight in each room. Or put a lamp next to the bed within easy reach.    Put nightlights in the bedrooms, hallways, kitchen, and bathrooms.    Make sure all stairways have good lighting.    Take your time when going up and down stairs.    Put handrails on both sides of stairs and in walkways for more support. To prevent injury to your wrist or arm, don t use handrails to pull yourself up.    Install grab bars to pull yourself up.    Move or rearrange items that you use often. This will make them easier to find or reach.    Look at your home to find any safety hazards. Especially look at doorways, walkways, and the driveway. Remove or repair any safety problems that you find.  Date Last Reviewed: 8/1/2016 2000-2019 [employer]. [address] [facility], [place] [address]. All rights reserved. This information is not intended as a substitute for professional medical care. Always follow your healthcare professional's instructions.

## 2020-07-10 NOTE — PROGRESS NOTES
"SUBJECTIVE:   Yanci Luther is a 75 year old female who presents for Preventive Visit.    She has a history of significant osteoporosis.  She gets yearly infusions of Reclast, next will be due again in September.  She had a colonoscopy last year which showed a sessile adenoma and recommendation was to repeat this again in 1 year so she will be due again in September.    She is otherwise doing well and has no other concerns.  Are you in the first 12 months of your Medicare Part B coverage?  No    Physical Health:    In general, how would you rate your overall physical health? good    Outside of work, how many days during the week do you exercise? 4-5 days/week    Outside of work, approximately how many minutes a day do you exercise?30-45 minutes  If you drink alcohol do you typically have >3 drinks per day or >7 drinks per week? Yes - AUDIT SCORE:           Do you usually eat at least 4 servings of fruit and vegetables a day, include whole grains & fiber and avoid regularly eating high fat or \"junk\" foods? Yes    Do you have any problems taking medications regularly?  No    Do you have any side effects from medications? not applicable    Needs assistance for the following daily activities: no assistance needed    Which of the following safety concerns are present in your home?  none identified     Hearing impairment: No    In the past 6 months, have you been bothered by leaking of urine? no    Mental Health:    In general, how would you rate your overall mental or emotional health? good  PHQ-2 Score: 0    Do you feel safe in your environment? Yes    Have you ever done Advance Care Planning? (For example, a Health Directive, POLST, or a discussion with a medical provider or your loved ones about your wishes): No, advance care planning information given to patient to review.  Patient plans to discuss their wishes with loved ones or provider.      Additional concerns to address?  No    Fall risk:  Fallen 2 or more " "times in the past year?: No  Any fall with injury in the past year?: Yes    Cognitive Screenin) Repeat 3 items (Leader, Season, Table)    2) Clock draw: ABNORMAL   3) 3 item recall: Recalls NO objects   Results: 0 items recalled: PROBABLE COGNITIVE IMPAIRMENT, **INFORM PROVIDER**    Mini-CogTM Copyright DELVIS Hartman. Licensed by the author for use in [facility]; reprinted with permission (claudette@[facility]). All rights reserved.      Do you have sleep apnea, excessive snoring or daytime drowsiness?: no    Reviewed and updated as needed this visit by clinical staff  Tobacco  Allergies  Meds  Soc Hx        Reviewed and updated as needed this visit by Provider        Social History     Tobacco Use     Smoking status: Former Smoker     Smokeless tobacco: Never Used     Tobacco comment: \"quit about 30 years ago\"   Substance Use Topics     Alcohol use: Yes     Comment: 1-2 wine or beer daily                           Current providers sharing in care for this patient include:   Patient Care Team:  Janes Gunter MD as PCP - General (Family Practice)  Janes Gunter MD as Assigned PCP    The following health maintenance items are reviewed in Epic and correct as of today:  Health Maintenance   Topic Date Due     SPIROMETRY  1945     ADVANCE CARE PLANNING  1945     COPD ACTION PLAN  1945     DTAP/TDAP/TD IMMUNIZATION (1 - Tdap) 1969     ZOSTER IMMUNIZATION (1 of 2) 1994     MEDICARE ANNUAL WELLNESS VISIT  2009     PNEUMOCOCCAL IMMUNIZATION 65+ LOW/MEDIUM RISK (2 of 2 - PPSV23) 2016     COLORECTAL CANCER SCREENING  2019     FALL RISK ASSESSMENT  2019     PHQ-2  2020     INFLUENZA VACCINE (1) 2020     LIPID  2023     DEXA  Completed     IPV IMMUNIZATION  Aged Out     MENINGITIS IMMUNIZATION  Aged Out     Will return for fasting labs.    ROS:  Constitutional, HEENT, cardiovascular, pulmonary, gi and gu systems are negative, except as " "otherwise noted.    OBJECTIVE:   BP (!) 146/82   Pulse 72   Temp 97  F (36.1  C)   Resp 16   Ht 1.63 m (5' 4.17\")   Wt 64.7 kg (142 lb 9.6 oz)   SpO2 95%   BMI 24.35 kg/m   Estimated body mass index is 24.35 kg/m  as calculated from the following:    Height as of this encounter: 1.63 m (5' 4.17\").    Weight as of this encounter: 64.7 kg (142 lb 9.6 oz).  EXAM:   GENERAL APPEARANCE: healthy, alert and no distress  EYES: Eyes grossly normal to inspection, PERRL and conjunctivae and sclerae normal  HENT: ear canals and TM's normal, nose and mouth without ulcers or lesions, oropharynx clear and oral mucous membranes moist  NECK: no adenopathy, no asymmetry, masses, or scars and thyroid normal to palpation  RESP: lungs clear to auscultation - no rales, rhonchi or wheezes  CV: regular rate and rhythm, normal S1 S2, no S3 or S4, no murmur, click or rub, no peripheral edema and peripheral pulses strong  ABDOMEN: soft, nontender, no hepatosplenomegaly, no masses and bowel sounds normal  MS: no musculoskeletal defects are noted and gait is age appropriate without ataxia  SKIN: no suspicious lesions or rashes  NEURO: Normal strength and tone, sensory exam grossly normal, mentation intact and speech normal  PSYCH: mentation appears normal and affect normal/bright    Diagnostic Test Results:  Labs reviewed in Epic    ASSESSMENT / PLAN:       ICD-10-CM    1. Encounter for Medicare annual wellness exam  Z00.00    2. Mixed hyperlipidemia  E78.2 Comprehensive Metabolic Panel     Lipid Panel   3. Night sweats  R61 TSH Reflex GH     CBC and Differential   4. Colon cancer screening  Z12.11 GASTROENTEROLOGY ADULT REF PROCEDURE ONLY   5. Encounter for screening mammogram for breast cancer  Z12.31 MA Screen Bilateral w/Balta     Will refer for mammography.    Will refer back for colonoscopy again in September.  She will be due for Reclast infusion in September and would like to try to combine all of these in the winter 2 " "days.    She states that she been having some night sweats at home.  No weight loss.  We will check TSH and CBC along with her fasting labs when she returns.    COUNSELING:  Reviewed preventive health counseling, as reflected in patient instructions       Regular exercise       Healthy diet/nutrition       Vision screening       Hearing screening       Dental care    Estimated body mass index is 24.35 kg/m  as calculated from the following:    Height as of this encounter: 1.63 m (5' 4.17\").    Weight as of this encounter: 64.7 kg (142 lb 9.6 oz).         reports that she has quit smoking. She has never used smokeless tobacco.      Appropriate preventive services were discussed with this patient, including applicable screening as appropriate for cardiovascular disease, diabetes, osteopenia/osteoporosis, and glaucoma.  As appropriate for age/gender, discussed screening for colorectal cancer, prostate cancer, breast cancer, and cervical cancer. Checklist reviewing preventive services available has been given to the patient.    Reviewed patients plan of care and provided an AVS. The Basic Care Plan (routine screening as documented in Health Maintenance) for Yanci meets the Care Plan requirement. This Care Plan has been established and reviewed with the Patient and spouse.    Counseling Resources:  ATP IV Guidelines  Pooled Cohorts Equation Calculator  Breast Cancer Risk Calculator  FRAX Risk Assessment  ICSI Preventive Guidelines  Dietary Guidelines for Americans, 2010  USDA's MyPlate  ASA Prophylaxis  Lung CA Screening    Janes Gunter MD  [facility]    She is at risk for falling and has been provided with information to reduce the risk of falling at home.  "

## 2020-07-10 NOTE — NURSING NOTE
Patient presents today for annual medicare wellness visit.  Medication Reconciliation Complete    Marian March LPN  7/10/2020 11:00 AM

## 2020-07-13 DIAGNOSIS — Z01.818 PRE-OP TESTING: Primary | ICD-10-CM

## 2020-07-13 DIAGNOSIS — Z12.11 SCREENING FOR COLON CANCER: ICD-10-CM

## 2020-07-13 NOTE — TELEPHONE ENCOUNTER
Screening Questions for the Scheduling of Screening Colonoscopies   (If Colonoscopy is diagnostic, Provider should review the chart before scheduling.)  Are you younger than 50 or older than 80?  NO   Do you take aspirin or fish oil?  YES - FISH OIL  (if yes, tell patient to stop 1 week prior to Colonoscopy)  Do you take warfarin (Coumadin), clopidogrel (Plavix), apixaban (Eliquis), dabigatram (Pradaxa), rivaroxaban (Xarelto) or any blood thinner? NO   Do you use oxygen at home?  NO   Do you have kidney disease? NO   Are you on dialysis? NO   Have you had a stroke or heart attack in the last year? NO  Have you had a stent in your heart or any blood vessel in the last year? NO   Have you had a transplant of any organ? NO   Have you had a colonoscopy or upper endoscopy (EGD) before? YES          When?  2019  Date of scheduled Colonoscopy. 09/15/2020  Provider [facility]   Pharmacy [facility]

## 2020-07-14 ENCOUNTER — OFFICE VISIT (OUTPATIENT)
Dept: FAMILY MEDICINE | Facility: OTHER | Age: 75
End: 2020-07-14
Attending: FAMILY MEDICINE
Payer: COMMERCIAL

## 2020-07-14 ENCOUNTER — HOSPITAL ENCOUNTER (OUTPATIENT)
Dept: GENERAL RADIOLOGY | Facility: OTHER | Age: 75
End: 2020-07-14
Attending: PHYSICIAN ASSISTANT
Payer: COMMERCIAL

## 2020-07-14 VITALS
TEMPERATURE: 97.8 F | WEIGHT: 142.4 LBS | HEART RATE: 72 BPM | SYSTOLIC BLOOD PRESSURE: 170 MMHG | DIASTOLIC BLOOD PRESSURE: 100 MMHG | BODY MASS INDEX: 24.31 KG/M2 | RESPIRATION RATE: 18 BRPM

## 2020-07-14 DIAGNOSIS — R61 NIGHT SWEATS: ICD-10-CM

## 2020-07-14 DIAGNOSIS — S69.92XA WRIST INJURY, LEFT, INITIAL ENCOUNTER: Primary | ICD-10-CM

## 2020-07-14 DIAGNOSIS — E78.2 MIXED HYPERLIPIDEMIA: ICD-10-CM

## 2020-07-14 LAB
ALBUMIN SERPL-MCNC: 4.4 G/DL (ref 3.5–5.7)
ALP SERPL-CCNC: 18 U/L (ref 34–104)
ALT SERPL W P-5'-P-CCNC: 21 U/L (ref 7–52)
ANION GAP SERPL CALCULATED.3IONS-SCNC: 11 MMOL/L (ref 3–14)
AST SERPL W P-5'-P-CCNC: 24 U/L (ref 13–39)
BASOPHILS # BLD AUTO: 0 10E9/L (ref 0–0.2)
BASOPHILS NFR BLD AUTO: 0.8 %
BILIRUB SERPL-MCNC: 0.8 MG/DL (ref 0.3–1)
BUN SERPL-MCNC: 13 MG/DL (ref 7–25)
CALCIUM SERPL-MCNC: 10.4 MG/DL (ref 8.6–10.3)
CHLORIDE SERPL-SCNC: 103 MMOL/L (ref 98–107)
CHOLEST SERPL-MCNC: 265 MG/DL
CO2 SERPL-SCNC: 26 MMOL/L (ref 21–31)
CREAT SERPL-MCNC: 0.76 MG/DL (ref 0.6–1.2)
DIFFERENTIAL METHOD BLD: ABNORMAL
EOSINOPHIL # BLD AUTO: 0.1 10E9/L (ref 0–0.7)
EOSINOPHIL NFR BLD AUTO: 2.4 %
ERYTHROCYTE [DISTWIDTH] IN BLOOD BY AUTOMATED COUNT: 12.5 % (ref 10–15)
GFR SERPL CREATININE-BSD FRML MDRD: 74 ML/MIN/{1.73_M2}
GLUCOSE SERPL-MCNC: 98 MG/DL (ref 70–105)
HCT VFR BLD AUTO: 42.1 % (ref 35–47)
HDLC SERPL-MCNC: 111 MG/DL (ref 23–92)
HGB BLD-MCNC: 14 G/DL (ref 11.7–15.7)
IMM GRANULOCYTES # BLD: 0 10E9/L (ref 0–0.4)
IMM GRANULOCYTES NFR BLD: 0.3 %
LDLC SERPL CALC-MCNC: 138 MG/DL
LYMPHOCYTES # BLD AUTO: 1.6 10E9/L (ref 0.8–5.3)
LYMPHOCYTES NFR BLD AUTO: 41.7 %
MCH RBC QN AUTO: 33.2 PG (ref 26.5–33)
MCHC RBC AUTO-ENTMCNC: 33.3 G/DL (ref 31.5–36.5)
MCV RBC AUTO: 100 FL (ref 78–100)
MONOCYTES # BLD AUTO: 0.5 10E9/L (ref 0–1.3)
MONOCYTES NFR BLD AUTO: 13.7 %
NEUTROPHILS # BLD AUTO: 1.5 10E9/L (ref 1.6–8.3)
NEUTROPHILS NFR BLD AUTO: 41.1 %
NONHDLC SERPL-MCNC: 154 MG/DL
PLATELET # BLD AUTO: 165 10E9/L (ref 150–450)
POTASSIUM SERPL-SCNC: 4 MMOL/L (ref 3.5–5.1)
PROT SERPL-MCNC: 7.5 G/DL (ref 6.4–8.9)
RBC # BLD AUTO: 4.22 10E12/L (ref 3.8–5.2)
SODIUM SERPL-SCNC: 140 MMOL/L (ref 134–144)
TRIGL SERPL-MCNC: 80 MG/DL
TSH SERPL DL<=0.05 MIU/L-ACNC: 1.68 IU/ML (ref 0.34–5.6)
WBC # BLD AUTO: 3.7 10E9/L (ref 4–11)

## 2020-07-14 PROCEDURE — 85025 COMPLETE CBC W/AUTO DIFF WBC: CPT | Mod: ZL | Performed by: FAMILY MEDICINE

## 2020-07-14 PROCEDURE — 84443 ASSAY THYROID STIM HORMONE: CPT | Mod: ZL | Performed by: FAMILY MEDICINE

## 2020-07-14 PROCEDURE — 36415 COLL VENOUS BLD VENIPUNCTURE: CPT | Mod: ZL | Performed by: FAMILY MEDICINE

## 2020-07-14 PROCEDURE — 73110 X-RAY EXAM OF WRIST: CPT | Mod: LT

## 2020-07-14 PROCEDURE — 80053 COMPREHEN METABOLIC PANEL: CPT | Mod: ZL | Performed by: FAMILY MEDICINE

## 2020-07-14 PROCEDURE — 99213 OFFICE O/P EST LOW 20 MIN: CPT | Performed by: PHYSICIAN ASSISTANT

## 2020-07-14 PROCEDURE — G0463 HOSPITAL OUTPT CLINIC VISIT: HCPCS | Mod: 25

## 2020-07-14 PROCEDURE — 80061 LIPID PANEL: CPT | Mod: ZL | Performed by: FAMILY MEDICINE

## 2020-07-14 ASSESSMENT — PAIN SCALES - GENERAL: PAINLEVEL: MODERATE PAIN (4)

## 2020-07-14 NOTE — PATIENT INSTRUCTIONS
Left wrist pain, s/p surgery 10 years ago  Lifted a cast iron pan that was heavy and pain started after this  XR left wrist  No evidence of acute or subacute bony abnormality.      Postoperative changes of the distal radius and distal ulna with  longitudinal fixation wire protruding from the ulnar styloid. No  evidence of apparent abutment with the adjacent bone or protrusion  through the skin.     Generalized osteopenia and scattered degenerative changes, most  evident at the first carpometacarpal joint.     Findings suggesting remote injury of the scapholunate ligament with  slight widening of the scapholunate joint.    Will treat for left wrist strain  Velcro wrist brace applied in clinic  Rest wrist, elevate and ice 10-20 minutes 4-5 times daily  Tylenol 650 mg every 4-6 hours as needed, max 1000 mg every 4-6 hours and 4000 mg per day  Ibuprofen 600 mg 2-3 times daily for 2-3 days  Return to clinic if symptoms persist or worsen  Patient Education     Wrist Sprain  A sprain is an injury to the ligaments or capsule that holds a joint together. There are no broken bones. Most sprains take about 3 to 6 weeks to heal. If it a severe sprain where the ligament is completely torn, it can take months to recover.  Most wrist sprains are treated with a splint, wrist brace, or elastic wrap for support. Severe sprains may require surgery.  Home care    Keep your arm elevated to reduce pain and swelling. This is very important during the first 48 hours.    Apply an ice pack over the injured area for 15 to 20 minutes every 3 to 6 hours. You should do this for the first 24 to 48 hours. You can make an ice pack by filling a plastic bag that seals at the top with ice cubes and then wrapping it with a thin towel. Continue to use ice packs for relief of pain and swelling as needed. As the ice melts, be careful to avoid getting your wrap, splint, or cast wet. After 48 hours, apply heat (warm shower or warm bath) for 15 to 20 minutes  several times a day, or alternate ice and heat.     You may use over-the-counter pain medicine to control pain, unless another pain medicine was prescribed. If you have chronic liver or kidney disease or ever had a stomach ulcer or gastrointestinal bleeding, talk with your doctor before using these medicines.    If you were given a splint or brace, wear it for the time advised by your doctor.  Follow-up care  Follow up with your healthcare provider, or as advised. Any X-rays you had today don t show any broken bones, breaks, or fractures. Sometimes fractures don t show up on the first X-ray. Bruises and sprains can sometimes hurt as much as a fracture. These injuries can take time to heal completely. If your symptoms don t improve or they get worse, talk with your doctor. You may need a repeat X-ray. If X-rays were taken, you will be told of any new findings that may affect your care.  When to seek medical advice  Call your healthcare provider right away if any of these occur:    Pain or swelling increases    Fingers or hand becomes cold, blue, numb, or tingly   Date Last Reviewed: 5/1/2018 2000-2019 [employer]. [address] [place] [address], [place] [address]. All rights reserved. This information is not intended as a substitute for professional medical care. Always follow your healthcare professional's instructions.

## 2020-07-14 NOTE — NURSING NOTE
"Chief Complaint   Patient presents with     Pain     pain in left wrist       Initial BP (!) 170/100 (BP Location: Right arm, Patient Position: Sitting, Cuff Size: Adult Large)   Pulse 72   Temp 97.8  F (36.6  C) (Tympanic)   Resp 18   Wt 64.6 kg (142 lb 6.4 oz)   Breastfeeding No   BMI 24.31 kg/m   Estimated body mass index is 24.31 kg/m  as calculated from the following:    Height as of 7/10/20: 1.63 m (5' 4.17\").    Weight as of this encounter: 64.6 kg (142 lb 6.4 oz).  Medication Reconciliation: complete    Hilda Roberts LPN  "

## 2020-07-14 NOTE — PROGRESS NOTES
SUBJECTIVE:  Yanci Luther is a 75 year old female presents today for left wrist pain  Onset last night  Mechanism of injury: no known injury, she has been kayaking. Last night the pain started when she lifted a heavy cast iron pan with 4 pounds hamburger  Associated symptoms - pain ulnar wrist    Location - ulnar wrist/dorsal wrist  Quality - constant ache  Severity - 4/10 to 10/10 with use  Aggravated by - use, bumping it  Alleviated by - rest  Treatments - tylenol and ice  Prior fracture or injury - 10 years ago she had a hiking injury and shattered the wrist. She had reconstructive surgery with plates/screws. She has not had any problems since this time.       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         Allergies   Allergen Reactions     Aspirin Other (See Comments)     -- Refuses as of 8/31/2017  Other reaction(s): Other - Describe In Comment Field  -- Refuses as of 8/31/2017     Hmg-Coa-R Inhibitors Other (See Comments)     -- Refuses as of 8/31/2017  Other reaction(s): Other - Describe In Comment Field  -- Refuses as of 8/31/2017         ROS  General: feels well, no fever  Musculoskeletal: left wrist pain per HPI      OBJECTIVE:  Vitals:    07/14/20 0925   BP: (!) 170/100   BP Location: Right arm   Patient Position: Sitting   Cuff Size: Adult Large   Pulse: 72   Resp: 18   Temp: 97.8  F (36.6  C)   TempSrc: Tympanic   Weight: 64.6 kg (142 lb 6.4 oz)     Vital signs as noted above.  Appearance: in no apparent distress.  Musculoskeletal: mild swelling ulnar wrist, no erythema or  ecchymosis  Palpation: TTP ulnar styloid and dorsal wrist  Neurovascular: normal pulses, cap refill, sensation to soft touch, temperature  ROM: limited due to pain    Normal exam: elbow, fingers    Results for orders placed or performed in visit on 07/14/20   XR Wrist Left G/E 3 Views     Status: None    Narrative    Exam: XR WRIST LEFT G/E 3 VIEWS     History:Female, age 75 years, left ulnar wrist and dorsal wrist pain.  Surgery 10 years ago; Wrist injury, left, initial encounter    Comparison:  None    Technique: Three views are submitted.    Findings: Bones are osteopenic. No evidence of acute or subacute  fracture.  No evidence of dislocation.  Postoperative changes are seen  consistent with open reduction internal fixation of distal radial  fracture. A longitudinal fixation wires also seen in the distal ulna.  There is slight widening of the scapholunate joint with calcification  in the expected location of the scapholunate ligament. Severe  degenerative changes are seen within the first carpometacarpal joint.           Impression    Impression:  No evidence of acute or subacute bony abnormality.     Postoperative changes of the distal radius and distal ulna with  longitudinal fixation wire protruding from the ulnar styloid. No  evidence of apparent abutment with the adjacent bone or protrusion  through the skin.    Generalized osteopenia and scattered degenerative changes, most  evident at the first carpometacarpal joint.    Findings suggesting remote injury of the scapholunate ligament with  slight widening of the scapholunate joint.    JOSEFA DIXON MD       ASSESSMENT:   Diagnosis Comments   1. Wrist injury, left, initial encounter  XR Wrist Left G/E 3 Views, order for DME         PLAN:  Left wrist pain, s/p surgery 10 years ago  Lifted a cast iron pan that was heavy and pain started after this  XR left wrist independently reviewed. Discussed image and radiology findings with patient    Will treat for  left wrist strain/sprain  Velcro wrist brace applied in clinic  Rest wrist, elevate and ice 10-20 minutes 4-5 times daily  Tylenol 650 mg every 4-6 hours as needed, max 1000 mg every 4-6 hours and 4000 mg per day  Ibuprofen 600 mg 2-3 times daily for 2-3 days  Return to clinic if symptoms persist or worsen  Patient received verbal and written instruction including review of warning signs    Nat Bright PA-C on 7/14/2020 at 11:03 AM

## 2020-07-20 RX ORDER — ZOLEDRONIC ACID 5 MG/100ML
5 INJECTION, SOLUTION INTRAVENOUS ONCE
Status: CANCELLED
Start: 2020-09-12

## 2020-07-24 RX ORDER — POLYETHYLENE GLYCOL 3350, SODIUM CHLORIDE, SODIUM BICARBONATE, POTASSIUM CHLORIDE 420; 11.2; 5.72; 1.48 G/4L; G/4L; G/4L; G/4L
4000 POWDER, FOR SOLUTION ORAL ONCE
Qty: 4000 ML | Refills: 0 | Status: SHIPPED | OUTPATIENT
Start: 2020-07-24 | End: 2020-07-24

## 2020-07-24 RX ORDER — BISACODYL 5 MG
TABLET, DELAYED RELEASE (ENTERIC COATED) ORAL
Qty: 2 TABLET | Refills: 0 | Status: ON HOLD | OUTPATIENT
Start: 2020-07-24 | End: 2020-09-22

## 2020-09-14 ENCOUNTER — HOSPITAL ENCOUNTER (OUTPATIENT)
Dept: MAMMOGRAPHY | Facility: OTHER | Age: 75
End: 2020-09-14
Attending: FAMILY MEDICINE
Payer: COMMERCIAL

## 2020-09-14 ENCOUNTER — HOSPITAL ENCOUNTER (OUTPATIENT)
Dept: INFUSION THERAPY | Facility: OTHER | Age: 75
End: 2020-09-14
Attending: FAMILY MEDICINE
Payer: COMMERCIAL

## 2020-09-14 VITALS
TEMPERATURE: 97.2 F | HEART RATE: 53 BPM | WEIGHT: 146 LBS | DIASTOLIC BLOOD PRESSURE: 73 MMHG | BODY MASS INDEX: 24.92 KG/M2 | SYSTOLIC BLOOD PRESSURE: 149 MMHG | HEIGHT: 64 IN | RESPIRATION RATE: 16 BRPM

## 2020-09-14 DIAGNOSIS — Z12.31 ENCOUNTER FOR SCREENING MAMMOGRAM FOR BREAST CANCER: ICD-10-CM

## 2020-09-14 DIAGNOSIS — M81.0 SENILE OSTEOPOROSIS: Primary | ICD-10-CM

## 2020-09-14 DIAGNOSIS — E83.52 HYPERCALCEMIA: ICD-10-CM

## 2020-09-14 PROCEDURE — 96365 THER/PROPH/DIAG IV INF INIT: CPT

## 2020-09-14 PROCEDURE — 25000128 H RX IP 250 OP 636: Performed by: FAMILY MEDICINE

## 2020-09-14 PROCEDURE — 77067 SCR MAMMO BI INCL CAD: CPT

## 2020-09-14 PROCEDURE — 25800030 ZZH RX IP 258 OP 636: Performed by: FAMILY MEDICINE

## 2020-09-14 RX ORDER — ZOLEDRONIC ACID 5 MG/100ML
5 INJECTION, SOLUTION INTRAVENOUS ONCE
Status: COMPLETED | OUTPATIENT
Start: 2020-09-14 | End: 2020-09-14

## 2020-09-14 RX ORDER — ZOLEDRONIC ACID 5 MG/100ML
5 INJECTION, SOLUTION INTRAVENOUS ONCE
Status: CANCELLED
Start: 2020-09-14

## 2020-09-14 RX ADMIN — ZOLEDRONIC ACID 5 MG: 5 INJECTION, SOLUTION INTRAVENOUS at 09:02

## 2020-09-14 RX ADMIN — SODIUM CHLORIDE 250 ML: 9 INJECTION, SOLUTION INTRAVENOUS at 09:02

## 2020-09-14 ASSESSMENT — MIFFLIN-ST. JEOR: SCORE: 1137.25

## 2020-09-14 NOTE — PROGRESS NOTES
Infusion Nursing Note:  Yanci Luther presents today for her yearly Reclast.    Patient seen by provider today: No   present during visit today: Not Applicable.    Note: N/A.    Intravenous Access:  Peripheral IV placed.    Treatment Conditions:  Lab Results   Component Value Date    HGB 14.0 07/14/2020     Lab Results   Component Value Date    WBC 3.7 07/14/2020      Lab Results   Component Value Date    ANEU 1.5 07/14/2020     Lab Results   Component Value Date     07/14/2020      Lab Results   Component Value Date     07/14/2020                   Lab Results   Component Value Date    POTASSIUM 4.0 07/14/2020           No results found for: MAG         Lab Results   Component Value Date    CR 0.76 07/14/2020                   Lab Results   Component Value Date    FARSHAD 10.4 07/14/2020                Results reviewed, labs MET treatment parameters, ok to proceed with treatment.    Post Infusion Assessment:  Patient tolerated infusion without incident.  Blood return noted pre and post infusion.  Site patent and intact, free from redness, edema or discomfort.  No evidence of extravasations.  Access discontinued per protocol.     Discharge Plan:   Patient and/or family verbalized understanding of discharge instructions and all questions answered.  Copy of AVS reviewed with patient and/or family.  Patient will return in 1 year for next appointment.  Patient discharged in stable condition accompanied by: self.  Departure Mode: Ambulatory.    Brenda J. Goodell, RN

## 2020-09-19 ENCOUNTER — ALLIED HEALTH/NURSE VISIT (OUTPATIENT)
Dept: FAMILY MEDICINE | Facility: OTHER | Age: 75
End: 2020-09-19
Payer: COMMERCIAL

## 2020-09-19 DIAGNOSIS — Z01.818 PRE-OP TESTING: ICD-10-CM

## 2020-09-19 PROCEDURE — 99207 ZZC NO CHARGE NURSE ONLY: CPT

## 2020-09-19 PROCEDURE — C9803 HOPD COVID-19 SPEC COLLECT: HCPCS

## 2020-09-19 PROCEDURE — U0003 INFECTIOUS AGENT DETECTION BY NUCLEIC ACID (DNA OR RNA); SEVERE ACUTE RESPIRATORY SYNDROME CORONAVIRUS 2 (SARS-COV-2) (CORONAVIRUS DISEASE [COVID-19]), AMPLIFIED PROBE TECHNIQUE, MAKING USE OF HIGH THROUGHPUT TECHNOLOGIES AS DESCRIBED BY CMS-2020-01-R: HCPCS | Mod: ZL | Performed by: SURGERY

## 2020-09-19 NOTE — NURSING NOTE
Chief Complaint   Patient presents with     Covid 19 Testing     surgery     Patient swabbed for COVID-19 testing.  Jerod Amador LPN on 9/19/2020 at 8:55 AM

## 2020-09-20 LAB
SARS-COV-2 RNA SPEC QL NAA+PROBE: NOT DETECTED
SPECIMEN SOURCE: NORMAL

## 2020-09-22 ENCOUNTER — ANESTHESIA EVENT (OUTPATIENT)
Dept: SURGERY | Facility: OTHER | Age: 75
End: 2020-09-22
Payer: COMMERCIAL

## 2020-09-22 ENCOUNTER — HOSPITAL ENCOUNTER (OUTPATIENT)
Facility: OTHER | Age: 75
Discharge: HOME | End: 2020-09-22
Attending: SURGERY | Admitting: SURGERY
Payer: COMMERCIAL

## 2020-09-22 ENCOUNTER — ANESTHESIA (OUTPATIENT)
Dept: SURGERY | Facility: OTHER | Age: 75
End: 2020-09-22
Payer: COMMERCIAL

## 2020-09-22 VITALS
BODY MASS INDEX: 23.56 KG/M2 | HEIGHT: 64 IN | WEIGHT: 138 LBS | DIASTOLIC BLOOD PRESSURE: 88 MMHG | HEART RATE: 57 BPM | TEMPERATURE: 96.9 F | OXYGEN SATURATION: 95 % | RESPIRATION RATE: 12 BRPM | SYSTOLIC BLOOD PRESSURE: 159 MMHG

## 2020-09-22 DIAGNOSIS — K63.5 POLYP OF COLON, UNSPECIFIED PART OF COLON, UNSPECIFIED TYPE: Primary | ICD-10-CM

## 2020-09-22 PROCEDURE — 25800030 ZZH RX IP 258 OP 636: Performed by: SURGERY

## 2020-09-22 PROCEDURE — 25000132 ZZH RX MED GY IP 250 OP 250 PS 637: Performed by: SURGERY

## 2020-09-22 PROCEDURE — 25000125 ZZHC RX 250: Performed by: SURGERY

## 2020-09-22 PROCEDURE — 45385 COLONOSCOPY W/LESION REMOVAL: CPT | Mod: PT

## 2020-09-22 PROCEDURE — 45380 COLONOSCOPY AND BIOPSY: CPT | Mod: PT | Performed by: SURGERY

## 2020-09-22 PROCEDURE — 25800030 ZZH RX IP 258 OP 636: Performed by: NURSE ANESTHETIST, CERTIFIED REGISTERED

## 2020-09-22 PROCEDURE — 45380 COLONOSCOPY AND BIOPSY: CPT | Mod: PT,XU | Performed by: SURGERY

## 2020-09-22 PROCEDURE — 99100 ANES PT EXTEME AGE<1 YR&>70: CPT | Performed by: NURSE ANESTHETIST, CERTIFIED REGISTERED

## 2020-09-22 PROCEDURE — 88305 TISSUE EXAM BY PATHOLOGIST: CPT

## 2020-09-22 PROCEDURE — 25000125 ZZHC RX 250: Performed by: NURSE ANESTHETIST, CERTIFIED REGISTERED

## 2020-09-22 PROCEDURE — 40000010 ZZH STATISTIC ANES STAT CODE-CRNA PER MINUTE: Performed by: SURGERY

## 2020-09-22 PROCEDURE — 45385 COLONOSCOPY W/LESION REMOVAL: CPT | Mod: PT | Performed by: SURGERY

## 2020-09-22 PROCEDURE — 45385 COLONOSCOPY W/LESION REMOVAL: CPT | Performed by: NURSE ANESTHETIST, CERTIFIED REGISTERED

## 2020-09-22 RX ORDER — FLUMAZENIL 0.1 MG/ML
0.2 INJECTION, SOLUTION INTRAVENOUS
Status: DISCONTINUED | OUTPATIENT
Start: 2020-09-22 | End: 2020-09-22 | Stop reason: HOSPADM

## 2020-09-22 RX ORDER — PROPOFOL 10 MG/ML
INJECTION, EMULSION INTRAVENOUS CONTINUOUS PRN
Status: DISCONTINUED | OUTPATIENT
Start: 2020-09-22 | End: 2020-09-22

## 2020-09-22 RX ORDER — LIDOCAINE 40 MG/G
CREAM TOPICAL
Status: DISCONTINUED | OUTPATIENT
Start: 2020-09-22 | End: 2020-09-22 | Stop reason: HOSPADM

## 2020-09-22 RX ORDER — NALOXONE HYDROCHLORIDE 0.4 MG/ML
.1-.4 INJECTION, SOLUTION INTRAMUSCULAR; INTRAVENOUS; SUBCUTANEOUS
Status: DISCONTINUED | OUTPATIENT
Start: 2020-09-22 | End: 2020-09-22 | Stop reason: HOSPADM

## 2020-09-22 RX ORDER — SIMETHICONE
LIQUID (ML) MISCELLANEOUS PRN
Status: DISCONTINUED | OUTPATIENT
Start: 2020-09-22 | End: 2022-08-01

## 2020-09-22 RX ORDER — SODIUM CHLORIDE, SODIUM LACTATE, POTASSIUM CHLORIDE, CALCIUM CHLORIDE 600; 310; 30; 20 MG/100ML; MG/100ML; MG/100ML; MG/100ML
INJECTION, SOLUTION INTRAVENOUS CONTINUOUS
Status: DISCONTINUED | OUTPATIENT
Start: 2020-09-22 | End: 2020-09-22 | Stop reason: HOSPADM

## 2020-09-22 RX ORDER — SODIUM CHLORIDE, SODIUM LACTATE, POTASSIUM CHLORIDE, CALCIUM CHLORIDE 600; 310; 30; 20 MG/100ML; MG/100ML; MG/100ML; MG/100ML
INJECTION, SOLUTION INTRAVENOUS CONTINUOUS PRN
Status: DISCONTINUED | OUTPATIENT
Start: 2020-09-22 | End: 2020-09-22

## 2020-09-22 RX ADMIN — PROPOFOL 130 MCG/KG/MIN: 10 INJECTION, EMULSION INTRAVENOUS at 07:29

## 2020-09-22 RX ADMIN — SODIUM CHLORIDE, POTASSIUM CHLORIDE, SODIUM LACTATE AND CALCIUM CHLORIDE: 600; 310; 30; 20 INJECTION, SOLUTION INTRAVENOUS at 07:26

## 2020-09-22 RX ADMIN — SODIUM CHLORIDE, POTASSIUM CHLORIDE, SODIUM LACTATE AND CALCIUM CHLORIDE 30 ML/HR: 600; 310; 30; 20 INJECTION, SOLUTION INTRAVENOUS at 07:11

## 2020-09-22 ASSESSMENT — MIFFLIN-ST. JEOR: SCORE: 1100.96

## 2020-09-22 ASSESSMENT — COPD QUESTIONNAIRES
COPD: 1
CAT_SEVERITY: MODERATE

## 2020-09-22 NOTE — H&P
GENERAL SURGERY CONSULTATION NOTE    Yanci Luther   PO   [facility] [address]  75 year old  female    Primary Care Provider:  Janes Gunter      HPI: Yanci Luther presents to day surgery in need of colonscopy for history of colon polyps.   Yanci Luther denies family history of colon cancer. Patient denies change in bowel habits or blood in stools. Previous colonoscopy was 1 yr ago, large >1cm sessile adenoma.     REVIEW OF SYSTEMS:    GENERAL: No fevers or chills. Denies fatigue, recent weight loss.  HEENT: No sinus drainage. No changes with vision or hearing. No difficulty swallowing.   LYMPHATICS:  Noswollen nodes in axilla, neck or groin.  CARDIOVASCULAR: Denies chest pain, palpitations and dyspnea on exertion.  PULMONARY: No shortness of breath or cough. No increase in sputum production.  GI: Denies melena,bright red blood in stools. No hematemesis. No constipation or diarrhea.  : No dysuria or hematuria.  SKIN: No recent rashes or ulcers.   HEMATOLOGY:  No history of easy bruising or bleeding.  ENDOCRINE:  No history of diabetes or thyroid problems.  NEUROLOGY:  No history of seizures or headaches. No motor or sensory changes.        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 Vitale     ARTHROTOMY WRIST      2011,L radius/ulna fracture, s/p plating     COLONOSCOPY  08/06/2009    follow up 10 years, 8/6/19     colonoscopy  09/11/2019    large >1cm sessile adenoma, f/u 1 yr, 2020       Family History   Adopted: Yes   Problem Relation Age of Onset     Genetic Disorder Other         Genetic,Adopted     Breast Cancer No family hx of         Cancer-breast       Social History     Social History Narrative    She is originally from [place].   She lives in [place] in the [place]  Formerly worked with stained glass.       Social History     Socioeconomic History     Marital status:      Spouse name: Not on file     Number of children: Not on file     Years of education: Not on file     Highest education level: Not on file   Occupational History     Not on file   Social Needs     Financial resource strain: Not on file     Food insecurity     Worry: Not on file     Inability: Not on file     Transportation needs     Medical: Not on file     Non-medical: Not on file   Tobacco Use     Smoking status: Former Smoker     Smokeless tobacco: Never Used     Tobacco comment: \"quit about 30 years ago\"   Substance and Sexual Activity     Alcohol use: Yes     Comment: 1-2 wine or beer daily     Drug use: Not Currently     Types: Other     Comment: Drug use: Not Asked     Sexual activity: Yes     Partners: Male   Lifestyle     Physical activity     Days per week: Not on file     Minutes per session: Not on file     Stress: Not on file   Relationships     Social connections     Talks on phone: Not on file     Gets together: Not on file     Attends Denominational service: Not on file     Active member of club or organization: Not on file     Attends meetings of clubs or organizations: Not on file     Relationship status: Not on file     Intimate partner violence     Fear of current or ex partner: Not on file     Emotionally abused: Not on " "file     Physically abused: Not on file     Forced sexual activity: Not on file   Other Topics Concern     Parent/sibling w/ CABG, MI or angioplasty before 65F 55M? Not Asked   Social History Narrative    She is originally from [place].   She lives in [place] in the summer with her daughter and fun[place]  Formerly worked with stained glass.       No current facility-administered medications on file prior to encounter.   calcium-magnesium (CALMAG) 500-250 MG TABS per tablet, Take 1 tablet by mouth 2 times daily  Cholecalciferol (VITAMIN D) 2000 units CAPS, Take 1 capsule by mouth daily  ELDERBERRY PO, Elderberry juice          ALLERGIES/SENSITIVITIES:   Allergies   Allergen Reactions     Aspirin Other (See Comments)     -- Refuses as of 8/31/2017  Other reaction(s): Other - Describe In Comment Field  -- Refuses as of 8/31/2017     Hmg-Coa-R Inhibitors Other (See Comments)     -- Refuses as of 8/31/2017  Other reaction(s): Other - Describe In Comment Field  -- Refuses as of 8/31/2017       PHYSICAL EXAM:     BP (!) 149/98   Temp 96.9  F (36.1  C) (Tympanic)   Ht 1.626 m (5' 4\")   Wt 62.6 kg (138 lb)   SpO2 96%   Breastfeeding No   BMI 23.69 kg/m      General Appearance:   Sitting up in bed, no apparent distress  HEENT: Pupils are equal and reactive, no scleral icterus   Heart & CV:  RRR, no murmur.  LUNGS: No increased work of breathing. Lungs are CTA B/L, no wheezing or crackles.  Abd:  soft, non-tender, no masses   Ext: no lower extremity edema   Neuro: alert and oriented, normal speech and mentation         CONSULTATION ASSESSMENT AND PLAN:    75 year old female with history of advanced adenoma in need of screening colonoscopy      The technical details of colonoscopy were discussed with the patient along with the risks and benefits to include bleeding, perforation and incomplete study. Yanci Luther demonstrated understanding and is willing to proceed.       Prince PATHAK" MD Philippe on 9/22/2020 at 7:17 AM

## 2020-09-22 NOTE — OP NOTE
COLONOSCOPY PROCEDURE NOTE    DATE OF SERVICE: 9/22/2020    SURGEON: ALEXANDER Paez MD     PRE-OP DIAGNOSIS:    History of advanced adenoma     POST-OP DIAGNOSIS:    Internal / external hemorrhoids  Polyps at ascending colon, hepatic flexure, rectum     PROCEDURE:   Colonoscopy with snare polypectomy    ASSISTANT:  Circulator: Abby Linares RN  Relief Circulator: Malena Ge RN  Scrub Person: Elvia Ariza  Pre-Op Nurse: Kelly Russell RN  Post-Op Nurse: Maryjane Arreola RN    ANESTHESIA:  MAC                            Monitor Anesthesia CareCRNA Independent: Kellerman, David, APRN CRNA    INDICATION FOR THE PROCEDURE: The patient is a 75 year old female with History of advanced adenoma. The patient has no other complaints.  After explaining the risks to include bleeding, perforation, potential inability to reach the cecum the patient wishes to proceed.    PROCEDURE: After adequate sedation, the patient was in the left lateral decubitus position.  Rectal exam was performed.  There was normal tone and no palpable masses, internal and external hemorrhoids noted.  The colonoscope was introduced into the rectum and advanced to the cecum with Moderate difficulty.  The patient's prep was good.  The terminal cecum was reached.  The cecum, ascending, transverse, descending and sigmoid colon were significant for three 2-3 mm polyps in the proximal ascending colon removed with cold forceps, two 6-7mm flat polyps in the hepatic flexure removed with hot snare, and two 2-3mm flat polyps in the rectum removed with cold forceps, suspect hyperplastic polyps in the rectum.  The scope was retroflexed in the rectum.  The anorectal junction was remarkable for internal hemorrhoids.  The scope was straightened and removed.  The patient tolerated the procedure well.     ESTIMATED BLOOD LOSS: none    COMPLICATIONS:  None    TISSUE REMOVED:  Yes    RECOMMEND:    Follow-up pending pathology  Fiber        ALEXANDER Paez  MD

## 2020-09-22 NOTE — ANESTHESIA POSTPROCEDURE EVALUATION
Patient: Yanci Luther    Procedure(s):  COLONOSCOPY, WITH LESION EXCISION USING HOT BIOPSY DEVICE    Diagnosis:History of colon polyps [Z86.010]  Diagnosis Additional Information: No value filed.    Anesthesia Type:  MAC    Note:  Anesthesia Post Evaluation    Patient location during evaluation: Phase 2  Patient participation: Able to fully participate in evaluation  Level of consciousness: awake and alert  Pain management: adequate  Airway patency: patent  Cardiovascular status: acceptable  Respiratory status: acceptable  Hydration status: acceptable  PONV: none             Last vitals:  Vitals:    09/22/20 0830 09/22/20 0845 09/22/20 0900   BP: (!) 141/83 (!) 159/84 (!) 159/88   Pulse: 62 57 57   Resp: 14 14 12   Temp:      SpO2: 95% 95% 95%         Electronically Signed By: David Kellerman, APRN CRNA  September 22, 2020  11:50 AM

## 2020-09-22 NOTE — DISCHARGE INSTRUCTIONS
[facility]  Adult Discharge Orders & Instructions    ________________________________________________________________          For 12 hours after surgery  1. Get plenty of rest.  A responsible adult must stay with you for at least 12 hours after you leave the hospital.   2. You may feel lightheaded.  IF so, sit for a few minutes before standing.  Have someone help you get up.   3. You may have a slight fever. Call the doctor if your fever is over 101 F (38.3 C) (taken under the tongue) or lasts longer than 24 hours.  4. You may have a dry mouth, a sore throat, muscle aches or trouble sleeping.  These should go away after 24 hours.  5. Do not make important or legal decisions.  6.   Do not drive or use heavy equipment.  If you have weakness or tingling, don't drive or use heavy equipment until this feeling goes away.    To contact a doctor, call   [phone]_______________________

## 2020-09-22 NOTE — ANESTHESIA CARE TRANSFER NOTE
Patient: Yanci Luther    Procedure(s):  COLONOSCOPY, WITH LESION EXCISION USING HOT BIOPSY DEVICE    Diagnosis: History of colon polyps [Z86.010]  Diagnosis Additional Information: No value filed.    Anesthesia Type:   MAC     Note:  Airway :Face Mask (Patient transported on O2 @ 10L/min)  Patient transferred to:Phase II  Handoff Report: Identifed the Patient, Identified the Reponsible Provider, Reviewed the pertinent medical history, Discussed the surgical course, Reviewed Intra-OP anesthesia mangement and issues during anesthesia, Set expectations for post-procedure period and Allowed opportunity for questions and acknowledgement of understanding      Vitals: (Last set prior to Anesthesia Care Transfer)    CRNA VITALS  9/22/2020 0750 - 9/22/2020 0821      9/22/2020             Resp Rate (set):  10                Electronically Signed By: David Kellerman, APRN CRNA  September 22, 2020  8:21 AM

## 2020-09-22 NOTE — ANESTHESIA PREPROCEDURE EVALUATION
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 Vitale     ARTHROTOMY WRIST      ,L radius/ulna fracture, s/p plating     COLONOSCOPY  2009    follow up 10 years, 19     colonoscopy  2019       Anesthesia Evaluation     . Pt has had prior anesthetic.            ROS/MED HX    ENT/Pulmonary:     (+)moderate COPD, , . .    Neurologic:  - neg neurologic ROS     Cardiovascular:  - neg cardiovascular ROS       METS/Exercise Tolerance:  >4 METS   Hematologic:  - neg hematologic  ROS       Musculoskeletal:  - neg musculoskeletal ROS       GI/Hepatic:  - neg GI/hepatic ROS       Renal/Genitourinary:  - ROS Renal section negative       Endo:  - neg endo ROS       Psychiatric:  - neg psychiatric ROS   (+) psychiatric history anxiety and depression      Infectious Disease:  - neg infectious disease ROS       Malignancy:      - no malignancy   Other:    - neg other ROS                      Physical Exam  Normal systems: cardiovascular and dental    Airway   Mallampati: II  TM distance: >3 FB  Neck ROM: full    Dental     Cardiovascular   Rhythm and rate: regular and normal      Pulmonary (+) decreased breath sounds               Lab Results   Component Value Date    WBC 3.7 (L) 2020    HGB 14.0 2020    HCT 42.1 2020     2020     2020    POTASSIUM 4.0 2020    CHLORIDE 103 2020    CO2 26 2020    BUN 13 2020     "CR 0.76 07/14/2020    GLC 98 07/14/2020    FARSHAD 10.4 (H) 07/14/2020    ALBUMIN 4.4 07/14/2020    PROTTOTAL 7.5 07/14/2020    ALT 21 07/14/2020    AST 24 07/14/2020    ALKPHOS 18 (L) 07/14/2020    BILITOTAL 0.8 07/14/2020       Preop Vitals  BP Readings from Last 3 Encounters:   09/22/20 (!) 149/98   09/14/20 (!) 149/73   07/14/20 (!) 170/100    Pulse Readings from Last 3 Encounters:   09/14/20 53   07/14/20 72   07/10/20 72      Resp Readings from Last 3 Encounters:   09/14/20 16   07/14/20 18   07/10/20 16    SpO2 Readings from Last 3 Encounters:   09/22/20 96%   07/10/20 95%   09/11/19 97%      Temp Readings from Last 1 Encounters:   09/22/20 96.9  F (36.1  C) (Tympanic)    Ht Readings from Last 1 Encounters:   09/22/20 1.626 m (5' 4\")      Wt Readings from Last 1 Encounters:   09/22/20 62.6 kg (138 lb)    Estimated body mass index is 23.69 kg/m  as calculated from the following:    Height as of this encounter: 1.626 m (5' 4\").    Weight as of this encounter: 62.6 kg (138 lb).       Anesthesia Plan      History & Physical Review      ASA Status:  3 .    NPO Status:  > 8 hours    Plan for MAC            Postoperative Care      Consents  Anesthetic plan, risks, benefits and alternatives discussed with:  Patient..                 David Kellerman, APRN CRNA  "

## 2020-09-22 NOTE — OR NURSING
Discharge instructions given to patient . No questions. Ambulated out of unit. Denies pain, nausea or dizziness

## 2020-10-20 ENCOUNTER — DOCUMENTATION ONLY (OUTPATIENT)
Dept: OTHER | Facility: CLINIC | Age: 75
End: 2020-10-20

## 2020-12-27 ENCOUNTER — HEALTH MAINTENANCE LETTER (OUTPATIENT)
Age: 75
End: 2020-12-27

## 2021-07-12 ENCOUNTER — OFFICE VISIT (OUTPATIENT)
Dept: FAMILY MEDICINE | Facility: OTHER | Age: 76
End: 2021-07-12
Attending: FAMILY MEDICINE
Payer: COMMERCIAL

## 2021-07-12 VITALS
TEMPERATURE: 97.7 F | OXYGEN SATURATION: 97 % | HEART RATE: 72 BPM | BODY MASS INDEX: 25.13 KG/M2 | SYSTOLIC BLOOD PRESSURE: 138 MMHG | RESPIRATION RATE: 20 BRPM | WEIGHT: 147.2 LBS | HEIGHT: 64 IN | DIASTOLIC BLOOD PRESSURE: 82 MMHG

## 2021-07-12 DIAGNOSIS — E78.2 MIXED HYPERLIPIDEMIA: ICD-10-CM

## 2021-07-12 DIAGNOSIS — M81.0 SENILE OSTEOPOROSIS: ICD-10-CM

## 2021-07-12 DIAGNOSIS — Z00.00 ENCOUNTER FOR MEDICARE ANNUAL WELLNESS EXAM: Primary | ICD-10-CM

## 2021-07-12 DIAGNOSIS — R61 NIGHT SWEATS: ICD-10-CM

## 2021-07-12 DIAGNOSIS — E83.52 HYPERCALCEMIA: ICD-10-CM

## 2021-07-12 LAB
ALBUMIN SERPL-MCNC: 4.4 G/DL (ref 3.5–5.7)
ALP SERPL-CCNC: 16 U/L (ref 34–104)
ALT SERPL W P-5'-P-CCNC: 25 U/L (ref 7–52)
ANION GAP SERPL CALCULATED.3IONS-SCNC: 10 MMOL/L (ref 3–14)
AST SERPL W P-5'-P-CCNC: 29 U/L (ref 13–39)
BASOPHILS # BLD AUTO: 0 10E3/UL (ref 0–0.2)
BASOPHILS NFR BLD AUTO: 1 %
BILIRUB SERPL-MCNC: 0.8 MG/DL (ref 0.3–1)
BUN SERPL-MCNC: 14 MG/DL (ref 7–25)
CALCIUM SERPL-MCNC: 10.6 MG/DL (ref 8.6–10.3)
CHLORIDE BLD-SCNC: 103 MMOL/L (ref 98–107)
CHOLEST SERPL-MCNC: 273 MG/DL
CO2 SERPL-SCNC: 27 MMOL/L (ref 21–31)
CREAT SERPL-MCNC: 0.77 MG/DL (ref 0.6–1.2)
EOSINOPHIL # BLD AUTO: 0.1 10E3/UL (ref 0–0.7)
EOSINOPHIL NFR BLD AUTO: 3 %
ERYTHROCYTE [DISTWIDTH] IN BLOOD BY AUTOMATED COUNT: 12.8 % (ref 10–15)
FASTING STATUS PATIENT QL REPORTED: YES
GFR SERPL CREATININE-BSD FRML MDRD: 75 ML/MIN/1.73M2
GLUCOSE BLD-MCNC: 95 MG/DL (ref 70–105)
HCT VFR BLD AUTO: 42 % (ref 35–47)
HDLC SERPL-MCNC: 105 MG/DL (ref 23–92)
HGB BLD-MCNC: 14.1 G/DL (ref 11.7–15.7)
IMM GRANULOCYTES # BLD: 0 10E3/UL
IMM GRANULOCYTES NFR BLD: 0 %
LDLC SERPL CALC-MCNC: 150 MG/DL
LYMPHOCYTES # BLD AUTO: 1.5 10E3/UL (ref 0.8–5.3)
LYMPHOCYTES NFR BLD AUTO: 36 %
MCH RBC QN AUTO: 33.5 PG (ref 26.5–33)
MCHC RBC AUTO-ENTMCNC: 33.6 G/DL (ref 31.5–36.5)
MCV RBC AUTO: 100 FL (ref 78–100)
MONOCYTES # BLD AUTO: 0.5 10E3/UL (ref 0–1.3)
MONOCYTES NFR BLD AUTO: 13 %
NEUTROPHILS # BLD AUTO: 2 10E3/UL (ref 1.6–8.3)
NEUTROPHILS NFR BLD AUTO: 47 %
NONHDLC SERPL-MCNC: 168 MG/DL
NRBC # BLD AUTO: 0 10E3/UL
NRBC BLD AUTO-RTO: 0 /100
PLATELET # BLD AUTO: 177 10E3/UL (ref 150–450)
POTASSIUM BLD-SCNC: 4.2 MMOL/L (ref 3.5–5.1)
PROT SERPL-MCNC: 7.5 G/DL (ref 6.4–8.9)
PTH-INTACT SERPL-MCNC: 20 PG/ML (ref 12–88)
RBC # BLD AUTO: 4.21 10E6/UL (ref 3.8–5.2)
SODIUM SERPL-SCNC: 140 MMOL/L (ref 134–144)
TRIGL SERPL-MCNC: 92 MG/DL
TSH SERPL DL<=0.005 MIU/L-ACNC: 1.27 MU/L (ref 0.4–4)
WBC # BLD AUTO: 4.2 10E3/UL (ref 4–11)

## 2021-07-12 PROCEDURE — G0009 ADMIN PNEUMOCOCCAL VACCINE: HCPCS

## 2021-07-12 PROCEDURE — 80053 COMPREHEN METABOLIC PANEL: CPT | Mod: ZL | Performed by: FAMILY MEDICINE

## 2021-07-12 PROCEDURE — 80061 LIPID PANEL: CPT | Mod: ZL | Performed by: FAMILY MEDICINE

## 2021-07-12 PROCEDURE — 85025 COMPLETE CBC W/AUTO DIFF WBC: CPT | Mod: ZL | Performed by: FAMILY MEDICINE

## 2021-07-12 PROCEDURE — G0439 PPPS, SUBSEQ VISIT: HCPCS | Performed by: FAMILY MEDICINE

## 2021-07-12 PROCEDURE — 36415 COLL VENOUS BLD VENIPUNCTURE: CPT | Mod: ZL | Performed by: FAMILY MEDICINE

## 2021-07-12 PROCEDURE — 84443 ASSAY THYROID STIM HORMONE: CPT | Mod: ZL | Performed by: FAMILY MEDICINE

## 2021-07-12 PROCEDURE — 83970 ASSAY OF PARATHORMONE: CPT | Performed by: FAMILY MEDICINE

## 2021-07-12 ASSESSMENT — PAIN SCALES - GENERAL: PAINLEVEL: NO PAIN (0)

## 2021-07-12 ASSESSMENT — MIFFLIN-ST. JEOR: SCORE: 1137.69

## 2021-07-12 ASSESSMENT — PATIENT HEALTH QUESTIONNAIRE - PHQ9: SUM OF ALL RESPONSES TO PHQ QUESTIONS 1-9: 0

## 2021-07-12 NOTE — NURSING NOTE
Patient presents today for annual medicare wellness visit.    Medication Reconciliation Complete    Marian March LPN  7/12/2021 9:20 AM

## 2021-07-12 NOTE — PATIENT INSTRUCTIONS
Patient Education   Personalized Prevention Plan  You are due for the preventive services outlined below.  Your care team is available to assist you in scheduling these services.  If you have already completed any of these items, please share that information with your care team to update in your medical record.  Health Maintenance Due   Topic Date Due     Breathing Capacity Test  Never done     COPD Action Plan  Never done     COVID-19 Vaccine (1) Never done     Hepatitis C Screening  Never done     Diptheria Tetanus Pertussis (DTAP/TDAP/TD) Vaccine (1 - Tdap) Never done     Pneumococcal Vaccine (2 of 2 - PPSV23) 08/06/2016     FALL RISK ASSESSMENT  07/10/2021     Zoster (Shingles) Vaccine (2 of 2) 11/13/2020

## 2021-07-12 NOTE — PROGRESS NOTES
"SUBJECTIVE:   Yanci Luther is a 76 year old female who presents for Preventive Visit.    She continues on yearly Reclast infusions for osteoporosis.  She continues to have some night sweats occasionally.  She otherwise has no new concerns.    Patient has been advised of split billing requirements and indicates understanding: Yes  Are you in the first 12 months of your Medicare Part B coverage?  No    Physical Health:    In general, how would you rate your overall physical health? good    Outside of work, how many days during the week do you exercise? none    Outside of work, approximately how many minutes a day do you exercise?less than 15 minutes    If you drink alcohol do you typically have >3 drinks per day or >7 drinks per week? No    Do you usually eat at least 4 servings of fruit and vegetables a day, include whole grains & fiber and avoid regularly eating high fat or \"junk\" foods? NO    Do you have any problems taking medications regularly?  No    Do you have any side effects from medications? none    Needs assistance for the following daily activities: no assistance needed    Which of the following safety concerns are present in your home?  none identified     Hearing impairment: No    In the past 6 months, have you been bothered by leaking of urine? no    Mental Health:    In general, how would you rate your overall mental or emotional health? good  PHQ-2 Score: 0    Do you feel safe in your environment? Yes    Have you ever done Advance Care Planning? (For example, a Health Directive, POLST, or a discussion with a medical provider or your loved ones about your wishes): Yes, advance care planning is on file.    Additional concerns to address?  No    Fall risk:  Fallen 2 or more times in the past year?: No  Any fall with injury in the past year?: No    Cognitive Screenin) Repeat 3 items (Leader, Season, Table)    2) Clock draw: NORMAL  3) 3 item recall: Recalls 2 objects   Results: NORMAL clock, 1-2 " "items recalled: COGNITIVE IMPAIRMENT LESS LIKELY    Mini-CogTM Copyright DELVIS Hartman. Licensed by the author for use in [facility]; reprinted with permission (claudette@.[facility]). All rights reserved.      Do you have sleep apnea, excessive snoring or daytime drowsiness?: no    Reviewed and updated as needed this visit by clinical staff  Tobacco  Allergies       Soc Hx        Reviewed and updated as needed this visit by Provider                Social History     Tobacco Use     Smoking status: Former Smoker     Smokeless tobacco: Never Used     Tobacco comment: \"quit about 30 years ago\"   Substance Use Topics     Alcohol use: Yes     Comment: 1-2 wine or beer daily                           Current providers sharing in care for this patient include:   Patient Care Team:  Janes Gunter as PCP - General (Family Practice)  Janes Gunter as Assigned PCP    The following health maintenance items are reviewed in Epic and correct as of today:  Health Maintenance   Topic Date Due     SPIROMETRY  Never done     COPD ACTION PLAN  Never done     COVID-19 Vaccine (1) Never done     HEPATITIS C SCREENING  Never done     DTAP/TDAP/TD IMMUNIZATION (1 - Tdap) Never done     Pneumococcal Vaccine: 65+ Years (2 of 2 - PPSV23) 08/06/2016     FALL RISK ASSESSMENT  07/10/2021     ZOSTER IMMUNIZATION (2 of 2) 11/13/2020     MEDICARE ANNUAL WELLNESS VISIT  07/10/2021     INFLUENZA VACCINE (1) 09/01/2021     LIPID  07/14/2025     ADVANCE CARE PLANNING  10/20/2025     DEXA  09/11/2033     PHQ-2  Completed     IPV IMMUNIZATION  Aged Out     MENINGITIS IMMUNIZATION  Aged Out     HEPATITIS B IMMUNIZATION  Aged Out     Lab work is in process    ROS:  Constitutional, HEENT, cardiovascular, pulmonary, gi and gu systems are negative, except as otherwise noted.    OBJECTIVE:   /82   Pulse 72   Temp 97.7  F (36.5  C)   Resp 20   Ht 1.626 m (5' 4\")   Wt 66.8 kg (147 lb 3.2 oz)   SpO2 97%   BMI 25.27 kg/m   Estimated body mass " "index is 25.27 kg/m  as calculated from the following:    Height as of this encounter: 1.626 m (5' 4\").    Weight as of this encounter: 66.8 kg (147 lb 3.2 oz).  EXAM:   GENERAL APPEARANCE: healthy, alert and no distress  EYES: Eyes grossly normal to inspection, PERRL and conjunctivae and sclerae normal  HENT: ear canals and TM's normal, nose and mouth without ulcers or lesions, oropharynx clear and oral mucous membranes moist  NECK: no adenopathy, no asymmetry, masses, or scars and thyroid normal to palpation  RESP: lungs clear to auscultation - no rales, rhonchi or wheezes  CV: regular rate and rhythm, normal S1 S2, no S3 or S4, no murmur, click or rub, no peripheral edema and peripheral pulses strong  ABDOMEN: soft, nontender, no hepatosplenomegaly, no masses and bowel sounds normal  MS: no musculoskeletal defects are noted and gait is age appropriate without ataxia  SKIN: no suspicious lesions or rashes  NEURO: Normal strength and tone, sensory exam grossly normal, mentation intact and speech normal  PSYCH: mentation appears normal and affect normal/bright    Diagnostic Test Results:  none     ASSESSMENT / PLAN:       ICD-10-CM    1. Encounter for Medicare annual wellness exam  Z00.00 TSH Reflex GH   2. Mixed hyperlipidemia  E78.2 Comprehensive Metabolic Panel     Lipid Panel   3. Night sweats  R61 CBC and Differential     She will follow-up in September for Reclast infusion and mammography.  Recent colonoscopy results reviewed with patient.    We will get labs once again and again check TSH and CBC for her night sweats.    Pneumovax was given today.    Patient has been advised of split billing requirements and indicates understanding: Yes    COUNSELING:  Reviewed preventive health counseling, as reflected in patient instructions       Regular exercise       Vision screening       Osteoporosis prevention/bone health       Colon cancer screening    Estimated body mass index is 25.27 kg/m  as calculated from the " "following:    Height as of this encounter: 1.626 m (5' 4\").    Weight as of this encounter: 66.8 kg (147 lb 3.2 oz).    Weight management plan: Discussed healthy diet and exercise guidelines    She reports that she has quit smoking. She has never used smokeless tobacco.    Appropriate preventive services were discussed with this patient, including applicable screening as appropriate for cardiovascular disease, diabetes, osteopenia/osteoporosis, and glaucoma.  As appropriate for age/gender, discussed screening for colorectal cancer, prostate cancer, breast cancer, and cervical cancer. Checklist reviewing preventive services available has been given to the patient.    Reviewed patients plan of care and provided an AVS. The Basic Care Plan (routine screening as documented in Health Maintenance) for Yanci meets the Care Plan requirement. This Care Plan has been established and reviewed with the Patient.    Counseling Resources:  ATP IV Guidelines  Pooled Cohorts Equation Calculator  Breast Cancer Risk Calculator  BRCA-Related Cancer Risk Assessment: FHS-7 Tool  FRAX Risk Assessment  ICSI Preventive Guidelines  Dietary Guidelines for Americans, 2010  USDA's MyPlate  ASA Prophylaxis  Lung CA Screening    Janes Gunter  [facility]  "

## 2021-07-12 NOTE — ADDENDUM NOTE
Encounter addended by: Alie Suh on: 7/12/2021 1:22 PM   Actions taken: Child order released for a procedure order

## 2021-07-14 RX ORDER — DIPHENHYDRAMINE HYDROCHLORIDE 50 MG/ML
50 INJECTION INTRAMUSCULAR; INTRAVENOUS
Status: CANCELLED
Start: 2021-09-15

## 2021-07-14 RX ORDER — EPINEPHRINE 1 MG/ML
0.3 INJECTION, SOLUTION, CONCENTRATE INTRAVENOUS EVERY 5 MIN PRN
Status: CANCELLED | OUTPATIENT
Start: 2021-09-15

## 2021-07-14 RX ORDER — ALBUTEROL SULFATE 0.83 MG/ML
2.5 SOLUTION RESPIRATORY (INHALATION)
Status: CANCELLED | OUTPATIENT
Start: 2021-09-15

## 2021-07-14 RX ORDER — METHYLPREDNISOLONE SODIUM SUCCINATE 125 MG/2ML
125 INJECTION, POWDER, LYOPHILIZED, FOR SOLUTION INTRAMUSCULAR; INTRAVENOUS
Status: CANCELLED
Start: 2021-09-15

## 2021-07-14 RX ORDER — ZOLEDRONIC ACID 5 MG/100ML
5 INJECTION, SOLUTION INTRAVENOUS ONCE
Status: CANCELLED
Start: 2021-09-15 | End: 2021-09-15

## 2021-07-14 RX ORDER — ALBUTEROL SULFATE 90 UG/1
1-2 AEROSOL, METERED RESPIRATORY (INHALATION)
Status: CANCELLED
Start: 2021-09-15

## 2021-07-14 RX ORDER — NALOXONE HYDROCHLORIDE 0.4 MG/ML
0.2 INJECTION, SOLUTION INTRAMUSCULAR; INTRAVENOUS; SUBCUTANEOUS
Status: CANCELLED | OUTPATIENT
Start: 2021-09-15

## 2021-09-15 ENCOUNTER — HOSPITAL ENCOUNTER (OUTPATIENT)
Dept: MAMMOGRAPHY | Facility: OTHER | Age: 76
End: 2021-09-15
Attending: FAMILY MEDICINE
Payer: COMMERCIAL

## 2021-09-15 ENCOUNTER — HOSPITAL ENCOUNTER (OUTPATIENT)
Dept: INFUSION THERAPY | Facility: OTHER | Age: 76
End: 2021-09-15
Attending: FAMILY MEDICINE
Payer: COMMERCIAL

## 2021-09-15 VITALS
RESPIRATION RATE: 18 BRPM | DIASTOLIC BLOOD PRESSURE: 70 MMHG | WEIGHT: 148 LBS | TEMPERATURE: 97.6 F | BODY MASS INDEX: 25.4 KG/M2 | HEART RATE: 63 BPM | SYSTOLIC BLOOD PRESSURE: 147 MMHG

## 2021-09-15 DIAGNOSIS — Z12.31 VISIT FOR SCREENING MAMMOGRAM: ICD-10-CM

## 2021-09-15 DIAGNOSIS — M81.0 SENILE OSTEOPOROSIS: Primary | ICD-10-CM

## 2021-09-15 PROCEDURE — 77063 BREAST TOMOSYNTHESIS BI: CPT

## 2021-09-15 PROCEDURE — 258N000003 HC RX IP 258 OP 636: Performed by: FAMILY MEDICINE

## 2021-09-15 PROCEDURE — 96365 THER/PROPH/DIAG IV INF INIT: CPT

## 2021-09-15 PROCEDURE — 250N000011 HC RX IP 250 OP 636: Performed by: FAMILY MEDICINE

## 2021-09-15 RX ORDER — ZOLEDRONIC ACID 5 MG/100ML
5 INJECTION, SOLUTION INTRAVENOUS ONCE
Status: COMPLETED | OUTPATIENT
Start: 2021-09-15 | End: 2021-09-15

## 2021-09-15 RX ORDER — DIPHENHYDRAMINE HYDROCHLORIDE 50 MG/ML
50 INJECTION INTRAMUSCULAR; INTRAVENOUS
Status: CANCELLED
Start: 2021-09-15

## 2021-09-15 RX ORDER — EPINEPHRINE 1 MG/ML
0.3 INJECTION, SOLUTION INTRAMUSCULAR; SUBCUTANEOUS EVERY 5 MIN PRN
Status: CANCELLED | OUTPATIENT
Start: 2021-09-15

## 2021-09-15 RX ORDER — ALBUTEROL SULFATE 0.83 MG/ML
2.5 SOLUTION RESPIRATORY (INHALATION)
Status: CANCELLED | OUTPATIENT
Start: 2021-09-15

## 2021-09-15 RX ORDER — NALOXONE HYDROCHLORIDE 0.4 MG/ML
0.2 INJECTION, SOLUTION INTRAMUSCULAR; INTRAVENOUS; SUBCUTANEOUS
Status: CANCELLED | OUTPATIENT
Start: 2021-09-15

## 2021-09-15 RX ORDER — ALBUTEROL SULFATE 90 UG/1
1-2 AEROSOL, METERED RESPIRATORY (INHALATION)
Status: CANCELLED
Start: 2021-09-15

## 2021-09-15 RX ORDER — METHYLPREDNISOLONE SODIUM SUCCINATE 125 MG/2ML
125 INJECTION, POWDER, LYOPHILIZED, FOR SOLUTION INTRAMUSCULAR; INTRAVENOUS
Status: CANCELLED
Start: 2021-09-15

## 2021-09-15 RX ORDER — ZOLEDRONIC ACID 5 MG/100ML
5 INJECTION, SOLUTION INTRAVENOUS ONCE
Status: CANCELLED
Start: 2021-09-15 | End: 2021-09-15

## 2021-09-15 RX ADMIN — ZOLEDRONIC ACID 5 MG: 0.05 INJECTION, SOLUTION INTRAVENOUS at 09:58

## 2021-09-15 RX ADMIN — SODIUM CHLORIDE 250 ML: 9 INJECTION, SOLUTION INTRAVENOUS at 09:58

## 2021-09-15 NOTE — NURSING NOTE
Infusion Nursing Note:  Yanci Luther presents today for yearly Reclast.    Patient seen by provider today: No   present during visit today: Not Applicable.    Note: N/A.      Intravenous Access:  Labs drawn without difficulty in July and provider authorized to use labs  Peripheral IV placed left antecubital space.    Treatment Conditions:  Lab Results   Component Value Date    HGB 14.1 07/12/2021    WBC 4.2 07/12/2021    ANEU 1.5 (L) 07/14/2020    ANEUTAUTO 2.0 07/12/2021     07/12/2021      Lab Results   Component Value Date     07/12/2021    POTASSIUM 4.2 07/12/2021    CR 0.77 07/12/2021    FARSHAD 10.6 (H) 07/12/2021    BILITOTAL 0.8 07/12/2021    ALBUMIN 4.4 07/12/2021    ALT 25 07/12/2021    AST 29 07/12/2021     Results reviewed, labs MET treatment parameters, ok to proceed with treatment.      Post Infusion Assessment:  Patient tolerated infusion without incident.  Blood return noted pre and post infusion.  Site patent and intact, free from redness, edema or discomfort.  No evidence of extravasations.  Access discontinued per protocol.       Discharge Plan:   Discharge instructions reviewed with: Patient.  Patient and/or family verbalized understanding of discharge instructions and all questions answered.  Patient discharged in stable condition accompanied by: self and .  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

## 2021-10-09 ENCOUNTER — HEALTH MAINTENANCE LETTER (OUTPATIENT)
Age: 76
End: 2021-10-09

## 2022-02-01 ENCOUNTER — TRANSFERRED RECORDS (OUTPATIENT)
Dept: HEALTH INFORMATION MANAGEMENT | Facility: OTHER | Age: 77
End: 2022-02-01
Payer: COMMERCIAL

## 2022-02-10 ENCOUNTER — TRANSFERRED RECORDS (OUTPATIENT)
Dept: HEALTH INFORMATION MANAGEMENT | Facility: OTHER | Age: 77
End: 2022-02-10
Payer: COMMERCIAL

## 2022-02-10 LAB — EJECTION FRACTION: NORMAL %

## 2022-02-23 ENCOUNTER — TRANSFERRED RECORDS (OUTPATIENT)
Dept: HEALTH INFORMATION MANAGEMENT | Facility: OTHER | Age: 77
End: 2022-02-23
Payer: COMMERCIAL

## 2022-03-09 ENCOUNTER — TRANSFERRED RECORDS (OUTPATIENT)
Dept: HEALTH INFORMATION MANAGEMENT | Facility: OTHER | Age: 77
End: 2022-03-09
Payer: COMMERCIAL

## 2022-03-09 LAB
CREATININE (EXTERNAL): 0.64 MG/DL (ref 0.6–1.3)
GLUCOSE (EXTERNAL): 94 MG/DL (ref 70–106)
POTASSIUM (EXTERNAL): 4.5 MEQ/L (ref 3.7–5.9)

## 2022-03-10 ENCOUNTER — TRANSFERRED RECORDS (OUTPATIENT)
Dept: HEALTH INFORMATION MANAGEMENT | Facility: OTHER | Age: 77
End: 2022-03-10
Payer: COMMERCIAL

## 2022-03-15 ENCOUNTER — TRANSFERRED RECORDS (OUTPATIENT)
Dept: HEALTH INFORMATION MANAGEMENT | Facility: OTHER | Age: 77
End: 2022-03-15
Payer: COMMERCIAL

## 2022-06-21 ENCOUNTER — HOSPITAL ENCOUNTER (OUTPATIENT)
Dept: GENERAL RADIOLOGY | Facility: OTHER | Age: 77
Discharge: HOME | End: 2022-06-21
Attending: FAMILY MEDICINE
Payer: COMMERCIAL

## 2022-06-21 ENCOUNTER — OFFICE VISIT (OUTPATIENT)
Dept: FAMILY MEDICINE | Facility: OTHER | Age: 77
End: 2022-06-21
Attending: FAMILY MEDICINE
Payer: COMMERCIAL

## 2022-06-21 VITALS
HEART RATE: 64 BPM | BODY MASS INDEX: 25.61 KG/M2 | HEIGHT: 64 IN | RESPIRATION RATE: 16 BRPM | WEIGHT: 150 LBS | TEMPERATURE: 98.3 F | DIASTOLIC BLOOD PRESSURE: 78 MMHG | OXYGEN SATURATION: 97 % | SYSTOLIC BLOOD PRESSURE: 148 MMHG

## 2022-06-21 DIAGNOSIS — R53.82 CHRONIC FATIGUE, UNSPECIFIED: ICD-10-CM

## 2022-06-21 DIAGNOSIS — R55 VASOVAGAL SYNCOPE: ICD-10-CM

## 2022-06-21 DIAGNOSIS — E78.2 MIXED HYPERLIPIDEMIA: ICD-10-CM

## 2022-06-21 DIAGNOSIS — M81.0 SENILE OSTEOPOROSIS: ICD-10-CM

## 2022-06-21 DIAGNOSIS — Z00.00 ENCOUNTER FOR MEDICARE ANNUAL WELLNESS EXAM: Primary | ICD-10-CM

## 2022-06-21 DIAGNOSIS — J44.9 OBSTRUCTIVE LUNG DISEASE (H): ICD-10-CM

## 2022-06-21 DIAGNOSIS — M79.641 PAIN OF RIGHT HAND: ICD-10-CM

## 2022-06-21 LAB
ALBUMIN SERPL-MCNC: 4.5 G/DL (ref 3.5–5.7)
ALP SERPL-CCNC: 17 U/L (ref 34–104)
ALT SERPL W P-5'-P-CCNC: 10 U/L (ref 7–52)
ANION GAP SERPL CALCULATED.3IONS-SCNC: 6 MMOL/L (ref 3–14)
AST SERPL W P-5'-P-CCNC: 18 U/L (ref 13–39)
BASOPHILS # BLD AUTO: 0 10E3/UL (ref 0–0.2)
BASOPHILS NFR BLD AUTO: 1 %
BILIRUB SERPL-MCNC: 0.8 MG/DL (ref 0.3–1)
BUN SERPL-MCNC: 17 MG/DL (ref 7–25)
CALCIUM SERPL-MCNC: 10.3 MG/DL (ref 8.6–10.3)
CHLORIDE BLD-SCNC: 103 MMOL/L (ref 98–107)
CHOLEST SERPL-MCNC: 273 MG/DL
CO2 SERPL-SCNC: 29 MMOL/L (ref 21–31)
CREAT SERPL-MCNC: 0.72 MG/DL (ref 0.6–1.2)
EOSINOPHIL # BLD AUTO: 0.1 10E3/UL (ref 0–0.7)
EOSINOPHIL NFR BLD AUTO: 3 %
ERYTHROCYTE [DISTWIDTH] IN BLOOD BY AUTOMATED COUNT: 13 % (ref 10–15)
FASTING STATUS PATIENT QL REPORTED: NO
GFR SERPL CREATININE-BSD FRML MDRD: 85 ML/MIN/1.73M2
GLUCOSE BLD-MCNC: 93 MG/DL (ref 70–105)
HCT VFR BLD AUTO: 41.1 % (ref 35–47)
HDLC SERPL-MCNC: 108 MG/DL (ref 23–92)
HGB BLD-MCNC: 14 G/DL (ref 11.7–15.7)
IMM GRANULOCYTES # BLD: 0 10E3/UL
IMM GRANULOCYTES NFR BLD: 0 %
LDLC SERPL CALC-MCNC: 145 MG/DL
LYMPHOCYTES # BLD AUTO: 1.7 10E3/UL (ref 0.8–5.3)
LYMPHOCYTES NFR BLD AUTO: 39 %
MCH RBC QN AUTO: 33.3 PG (ref 26.5–33)
MCHC RBC AUTO-ENTMCNC: 34.1 G/DL (ref 31.5–36.5)
MCV RBC AUTO: 98 FL (ref 78–100)
MONOCYTES # BLD AUTO: 0.7 10E3/UL (ref 0–1.3)
MONOCYTES NFR BLD AUTO: 16 %
NEUTROPHILS # BLD AUTO: 1.8 10E3/UL (ref 1.6–8.3)
NEUTROPHILS NFR BLD AUTO: 41 %
NONHDLC SERPL-MCNC: 165 MG/DL
NRBC # BLD AUTO: 0 10E3/UL
NRBC BLD AUTO-RTO: 0 /100
PLATELET # BLD AUTO: 172 10E3/UL (ref 150–450)
POTASSIUM BLD-SCNC: 4.1 MMOL/L (ref 3.5–5.1)
PROT SERPL-MCNC: 7.3 G/DL (ref 6.4–8.9)
RBC # BLD AUTO: 4.21 10E6/UL (ref 3.8–5.2)
SODIUM SERPL-SCNC: 138 MMOL/L (ref 134–144)
TRIGL SERPL-MCNC: 98 MG/DL
TSH SERPL DL<=0.005 MIU/L-ACNC: 1.84 MU/L (ref 0.4–4)
WBC # BLD AUTO: 4.3 10E3/UL (ref 4–11)

## 2022-06-21 PROCEDURE — 80053 COMPREHEN METABOLIC PANEL: CPT | Mod: ZL | Performed by: FAMILY MEDICINE

## 2022-06-21 PROCEDURE — 85025 COMPLETE CBC W/AUTO DIFF WBC: CPT | Mod: ZL | Performed by: FAMILY MEDICINE

## 2022-06-21 PROCEDURE — G0463 HOSPITAL OUTPT CLINIC VISIT: HCPCS | Mod: 25

## 2022-06-21 PROCEDURE — 84443 ASSAY THYROID STIM HORMONE: CPT | Mod: ZL | Performed by: FAMILY MEDICINE

## 2022-06-21 PROCEDURE — 36415 COLL VENOUS BLD VENIPUNCTURE: CPT | Mod: ZL | Performed by: FAMILY MEDICINE

## 2022-06-21 PROCEDURE — G0439 PPPS, SUBSEQ VISIT: HCPCS | Performed by: FAMILY MEDICINE

## 2022-06-21 PROCEDURE — 99213 OFFICE O/P EST LOW 20 MIN: CPT | Mod: 25 | Performed by: FAMILY MEDICINE

## 2022-06-21 PROCEDURE — 80061 LIPID PANEL: CPT | Mod: ZL | Performed by: FAMILY MEDICINE

## 2022-06-21 PROCEDURE — 73130 X-RAY EXAM OF HAND: CPT | Mod: RT

## 2022-06-21 RX ORDER — HEPARIN SODIUM (PORCINE) LOCK FLUSH IV SOLN 100 UNIT/ML 100 UNIT/ML
5 SOLUTION INTRAVENOUS
Status: CANCELLED | OUTPATIENT
Start: 2022-06-21

## 2022-06-21 RX ORDER — ACETAMINOPHEN 325 MG/1
325 TABLET ORAL ONCE
Status: CANCELLED | OUTPATIENT
Start: 2022-06-21

## 2022-06-21 RX ORDER — HEPARIN SODIUM,PORCINE 10 UNIT/ML
5 VIAL (ML) INTRAVENOUS
Status: CANCELLED | OUTPATIENT
Start: 2022-06-21

## 2022-06-21 RX ORDER — MEPERIDINE HYDROCHLORIDE 50 MG/ML
25 INJECTION INTRAMUSCULAR; INTRAVENOUS; SUBCUTANEOUS EVERY 30 MIN PRN
Status: CANCELLED | OUTPATIENT
Start: 2022-06-21

## 2022-06-21 RX ORDER — ALBUTEROL SULFATE 90 UG/1
1-2 AEROSOL, METERED RESPIRATORY (INHALATION)
Status: CANCELLED
Start: 2022-06-21

## 2022-06-21 RX ORDER — NALOXONE HYDROCHLORIDE 0.4 MG/ML
0.2 INJECTION, SOLUTION INTRAMUSCULAR; INTRAVENOUS; SUBCUTANEOUS
Status: CANCELLED | OUTPATIENT
Start: 2022-06-21

## 2022-06-21 RX ORDER — ALBUTEROL SULFATE 0.83 MG/ML
2.5 SOLUTION RESPIRATORY (INHALATION)
Status: CANCELLED | OUTPATIENT
Start: 2022-06-21

## 2022-06-21 RX ORDER — ZOLEDRONIC ACID 5 MG/100ML
5 INJECTION, SOLUTION INTRAVENOUS ONCE
Status: CANCELLED
Start: 2022-06-21

## 2022-06-21 RX ORDER — DIPHENHYDRAMINE HYDROCHLORIDE 50 MG/ML
50 INJECTION INTRAMUSCULAR; INTRAVENOUS
Status: CANCELLED
Start: 2022-06-21

## 2022-06-21 RX ORDER — EPINEPHRINE 1 MG/ML
0.3 INJECTION, SOLUTION, CONCENTRATE INTRAVENOUS EVERY 5 MIN PRN
Status: CANCELLED | OUTPATIENT
Start: 2022-06-21

## 2022-06-21 RX ORDER — METHYLPREDNISOLONE SODIUM SUCCINATE 125 MG/2ML
125 INJECTION, POWDER, LYOPHILIZED, FOR SOLUTION INTRAMUSCULAR; INTRAVENOUS
Status: CANCELLED
Start: 2022-06-21

## 2022-06-21 ASSESSMENT — ENCOUNTER SYMPTOMS
DYSURIA: 0
CONSTIPATION: 0
SORE THROAT: 0
HEMATOCHEZIA: 0
SHORTNESS OF BREATH: 0
HEMATURIA: 0
HEARTBURN: 0
ARTHRALGIAS: 1
MYALGIAS: 0
DIARRHEA: 0
HEADACHES: 0
JOINT SWELLING: 1
PARESTHESIAS: 0
NAUSEA: 0
FREQUENCY: 0
ABDOMINAL PAIN: 0
DIZZINESS: 1
CHILLS: 0
WEAKNESS: 1
COUGH: 0
NERVOUS/ANXIOUS: 0
PALPITATIONS: 0
FEVER: 0
EYE PAIN: 0
BREAST MASS: 0

## 2022-06-21 ASSESSMENT — PAIN SCALES - GENERAL: PAINLEVEL: NO PAIN (0)

## 2022-06-21 ASSESSMENT — ACTIVITIES OF DAILY LIVING (ADL): CURRENT_FUNCTION: NO ASSISTANCE NEEDED

## 2022-06-21 NOTE — PROGRESS NOTES
"SUBJECTIVE:   Yanci Luther is a 77 year old female who presents for Preventive Visit.    In March she had several syncopal and presyncopal episodes while in [place].  This led to a extensive cardiovascular work-up including echocardiogram which showed ejection fraction of 55 to 60% and no significant valvular abnormalities.  She had angiogram performed which did not show any significant blockages in her coronary arteries.  She also had a Holter monitor for 13 days which showed frequent PVCs and PACs without any other abnormality.  She has not had any further syncopal or presyncopal episodes since she returned from [place].    She also reports that she fractured her third and fourth right metacarpals.  Since that time she has had continued right wrist pain and weakness.  She also has some pain at the base of her first metacarpal.    She also has a history of hyperlipidemia, obstructive lung disease that have not been treated with medications at this time.    Patient has been advised of split billing requirements and indicates understanding: Yes  Are you in the first 12 months of your Medicare coverage?  No    Healthy Habits:     In general, how would you rate your overall health?  Fair    Frequency of exercise:  1 day/week    Duration of exercise:  15-30 minutes    Do you usually eat at least 4 servings of fruit and vegetables a day, include whole grains    & fiber and avoid regularly eating high fat or \"junk\" foods?  Yes    Taking medications regularly:  Yes    Medication side effects:  Not applicable    Ability to successfully perform activities of daily living:  No assistance needed    Home Safety:  No safety concerns identified    Hearing Impairment:  No hearing concerns    In the past 6 months, have you been bothered by leaking of urine?  No    In general, how would you rate your overall mental or emotional health?  Good      PHQ-2 Total Score: 0    Additional concerns today:  Yes    Do you feel safe in your " "environment? Yes    Have you ever done Advance Care Planning? (For example, a Health Directive, POLST, or a discussion with a medical provider or your loved ones about your wishes): Yes, advance care planning is on file.       Fall risk       Cognitive Screening   1) Repeat 3 items (Leader, Season, Table)    2) Clock draw: ABNORMAL -  3) 3 item recall: Recalls 1 object   Results: ABNORMAL clock, 1-2 items recalled: PROBABLE COGNITIVE IMPAIRMENT, **INFORM PROVIDER**    Mini-CogTM Copyright DELVIS Hartman. Licensed by the author for use in [facility]; reprinted with permission (claudette@[facility]). All rights reserved.      Do you have sleep apnea, excessive snoring or daytime drowsiness?: no    Reviewed and updated as needed this visit by clinical staff   Tobacco  Allergies  Meds                Reviewed and updated as needed this visit by Provider                   Social History     Tobacco Use     Smoking status: Former Smoker     Smokeless tobacco: Never Used     Tobacco comment: \"quit about 30 years ago\"   Substance Use Topics     Alcohol use: Yes     Comment: 1-2 wine or beer daily     If you drink alcohol do you typically have >3 drinks per day or >7 drinks per week? Yes      Alcohol Use 6/21/2022   Prescreen: >3 drinks/day or >7 drinks/week? Yes   Prescreen: >3 drinks/day or >7 drinks/week? -   AUDIT SCORE  5     AUDIT - Alcohol Use Disorders Identification Test - Reproduced from the World Health Organization Audit 2001 (Second Edition) 6/21/2022   1.  How often do you have a drink containing alcohol? 4 or more times a week   2.  How many drinks containing alcohol do you have on a typical day when you are drinking? 3 or 4   3.  How often do you have five or more drinks on one occasion? Never   4.  How often during the last year have you found that you were not able to stop drinking once you had started? Never   5.  How often during the last year have you failed to do what was normally expected of you " "because of drinking? Never   6.  How often during the last year have you needed a first drink in the morning to get yourself going after a heavy drinking session? Never   7.  How often during the last year have you had a feeling of guilt or remorse after drinking? Never   8.  How often during the last year have you been unable to remember what happened the night before because of your drinking? Never   9.  Have you or someone else been injured because of your drinking? No   10. Has a relative, friend, doctor or other health care worker been concerned about your drinking or suggested you cut down? No   TOTAL SCORE 5               Current providers sharing in care for this patient include:   Patient Care Team:  Janes Gunter as PCP - General (Family Practice)  Janes Gunter as Assigned PCP    The following health maintenance items are reviewed in Epic and correct as of today:  Health Maintenance Due   Topic Date Due     SPIROMETRY  Never done     COPD ACTION PLAN  Never done     COVID-19 Vaccine (1) Never done     DTAP/TDAP/TD IMMUNIZATION (1 - Tdap) Never done     LUNG CANCER SCREENING  Never done     ZOSTER IMMUNIZATION (2 of 2) 11/13/2020     FALL RISK ASSESSMENT  07/12/2022     Lab work is in process    Pertinent mammograms are reviewed under the imaging tab.    Review of Systems  Constitutional, HEENT, cardiovascular, pulmonary, gi and gu systems are negative, except as otherwise noted.    OBJECTIVE:   BP (!) 148/78 (BP Location: Right arm, Patient Position: Sitting, Cuff Size: Adult Regular)   Pulse 64   Temp 98.3  F (36.8  C) (Tympanic)   Resp 16   Ht 1.626 m (5' 4\")   Wt 68 kg (150 lb)   SpO2 97%   Breastfeeding No   BMI 25.75 kg/m   Estimated body mass index is 25.75 kg/m  as calculated from the following:    Height as of this encounter: 1.626 m (5' 4\").    Weight as of this encounter: 68 kg (150 lb).  Physical Exam  GENERAL: healthy, alert and no distress  EYES: Eyes grossly normal to inspection, " PERRL and conjunctivae and sclerae normal  HENT: ear canals and TM's normal, nose and mouth without ulcers or lesions  NECK: no adenopathy, no asymmetry, masses, or scars and thyroid normal to palpation  RESP: lungs clear to auscultation - no rales, rhonchi or wheezes  CV: regular rate and rhythm, normal S1 S2, no S3 or S4, no murmur, click or rub, no peripheral edema and peripheral pulses strong  MS: She has some chronic flexion at her fourth finger likely related to extensor tendon disruption.  She has some mild tenderness at the base of her first right metacarpal.  SKIN: no suspicious lesions or rashes  NEURO: Normal strength and tone, mentation intact and speech normal  PSYCH: mentation appears normal, affect normal/bright    Diagnostic Test Results:  none     ASSESSMENT / PLAN:       ICD-10-CM    1. Encounter for Medicare annual wellness exam  Z00.00    2. Obstructive lung disease (H)  J44.9    3. Vasovagal syncope  R55 TSH Reflex GH     TSH Reflex GH   4. Mixed hyperlipidemia  E78.2 Comprehensive Metabolic Panel     Lipid Panel     CBC and Differential     Comprehensive Metabolic Panel     Lipid Panel     CBC and Differential   5. Chronic fatigue, unspecified   R53.82 TSH Reflex GH     TSH Reflex GH   6. Pain of right hand  M79.641 XR Hand Right G/E 3 Views     Yearly fasting labs were obtained today.    She does not require any medication refills.    For her right hand pain x-rays were repeated, personally viewed and shows given degenerative change at her carpometacarpal joint as well as her first CMC joint.  She has healing fractures of her proximal phalanx of her fourth and fifth digits.  Recommend ice and anti-inflammatories.    Outside records from [place] were reviewed including echocardiogram, Holter and angiogram.  At this time nothing further is needed.  If she has continued syncopal episodes would recommend cardiology referral in [place].  Continue to discuss and maintain hydration.    Finally, for  "her osteoporosis a new referral for replaced infusion was completed.    Patient has been advised of split billing requirements and indicates understanding: Yes    COUNSELING:  Reviewed preventive health counseling, as reflected in patient instructions       Regular exercise       Healthy diet/nutrition       Vision screening       Hearing screening       Dental care    Estimated body mass index is 25.75 kg/m  as calculated from the following:    Height as of this encounter: 1.626 m (5' 4\").    Weight as of this encounter: 68 kg (150 lb).    Weight management plan: Discussed healthy diet and exercise guidelines    She reports that she has quit smoking. She has never used smokeless tobacco.      Appropriate preventive services were discussed with this patient, including applicable screening as appropriate for cardiovascular disease, diabetes, osteopenia/osteoporosis, and glaucoma.  As appropriate for age/gender, discussed screening for colorectal cancer, prostate cancer, breast cancer, and cervical cancer. Checklist reviewing preventive services available has been given to the patient.    Reviewed patients plan of care and provided an AVS. The Basic Care Plan (routine screening as documented in Health Maintenance) for Yanci meets the Care Plan requirement. This Care Plan has been established and reviewed with the Patient.    Counseling Resources:  ATP IV Guidelines  Pooled Cohorts Equation Calculator  Breast Cancer Risk Calculator  Breast Cancer: Medication to Reduce Risk  FRAX Risk Assessment  ICSI Preventive Guidelines  Dietary Guidelines for Americans, 2010  [employer]'s MyPlate  ASA Prophylaxis  Lung CA Screening    Janes Gunter  [facility]    Identified Health Risks:  "

## 2022-06-21 NOTE — PROGRESS NOTES
"    The patient reports that she drinks more than one alcoholic drink per day but denies binge or excessive drinking. She was counseled and given information about possible harmful effects of excessive alcohol intake.  Answers for HPI/ROS submitted by the patient on 6/21/2022  In general, how would you rate your overall physical health?: fair  Frequency of exercise:: 1 day/week  Do you usually eat at least 4 servings of fruit and vegetables a day, include whole grains & fiber, and avoid regularly eating high fat or \"junk\" foods? : Yes  Taking medications regularly:: Yes  Medication side effects:: Not applicable  Activities of Daily Living: no assistance needed  Home safety: no safety concerns identified  Hearing Impairment:: no hearing concerns  In the past 6 months, have you been bothered by leaking of urine?: No  abdominal pain: No  Blood in stool: No  Blood in urine: No  chest pain: No  chills: No  congestion: No  constipation: No  cough: No  diarrhea: No  dizziness: Yes  ear pain: No  eye pain: No  nervous/anxious: No  fever: No  frequency: No  genital sores: No  headaches: No  hearing loss: No  heartburn: No  arthralgias: Yes  joint swelling: Yes  peripheral edema: No  mood changes: No  myalgias: No  nausea: No  dysuria: No  palpitations: No  Skin sensation changes: No  sore throat: No  urgency: No  rash: No  shortness of breath: No  visual disturbance: No  weakness: Yes  pelvic pain: No  vaginal bleeding: No  vaginal discharge: No  tenderness: No  breast mass: No  breast discharge: No  In general, how would you rate your overall mental or emotional health?: good  Additional concerns today:: Yes  Duration of exercise:: 15-30 minutes        "

## 2022-06-21 NOTE — PATIENT INSTRUCTIONS
Patient Education   Personalized Prevention Plan  You are due for the preventive services outlined below.  Your care team is available to assist you in scheduling these services.  If you have already completed any of these items, please share that information with your care team to update in your medical record.  Health Maintenance Due   Topic Date Due     Breathing Capacity Test  Never done     COPD Action Plan  Never done     COVID-19 Vaccine (1) Never done     Diptheria Tetanus Pertussis (DTAP/TDAP/TD) Vaccine (1 - Tdap) Never done     LUNG CANCER SCREENING  Never done     Zoster (Shingles) Vaccine (2 of 2) 11/13/2020     FALL RISK ASSESSMENT  07/12/2022      Patient Education   Alcohol Use     Many people can enjoy a glass of wine or beer without any negative consequences to their health. According to the Centers for Disease Control and Prevention (CDC), having one or fewer drinks per day for women and two or fewer per day for men is considered moderate drinking.     When people drink more than moderately, it can become concerning. Excessive drinking is defined as consuming 15 drinks or more per week for men and 8 drinks or more per week for women. There are various health problems associated with excessive drinking, which include:    Damage to vital organs like the heart, brain, liver and pancreas    Harm to the digestive tract    Weaken the immune system    Higher risk for heart disease and cancer    There are many resources available to people who are addicted to alcohol. A counselor or other health care provider can give you support. So can a , , or rabbi who is trained in substance abuse counseling. Friends and family may also help once you are connected with professionals.

## 2022-06-21 NOTE — NURSING NOTE
"Chief Complaint   Patient presents with     Medicare Visit       Medication reconciliation completed.    FOOD SECURITY SCREENING QUESTIONS:    The next two questions are to help us understand your food security.  If you are feeling you need any assistance in this area, we have resources available to support you today.    Hunger Vital Signs:  Within the past 12 months we worried whether our food would run out before we got money to buy more. Never  Within the past 12 months the food we bought just didn't last and we didn't have money to get more. Never    Initial Pulse 64   Temp 98.3  F (36.8  C) (Tympanic)   Resp 16   Ht 1.626 m (5' 4\")   Wt 68 kg (150 lb)   SpO2 97%   Breastfeeding No   BMI 25.75 kg/m   Estimated body mass index is 25.75 kg/m  as calculated from the following:    Height as of this encounter: 1.626 m (5' 4\").    Weight as of this encounter: 68 kg (150 lb).       Tania Urbina LPN .......  6/21/2022  9:59 AM  "

## 2022-08-01 ENCOUNTER — OFFICE VISIT (OUTPATIENT)
Dept: FAMILY MEDICINE | Facility: OTHER | Age: 77
End: 2022-08-01
Attending: FAMILY MEDICINE
Payer: COMMERCIAL

## 2022-08-01 VITALS
BODY MASS INDEX: 25.75 KG/M2 | OXYGEN SATURATION: 96 % | DIASTOLIC BLOOD PRESSURE: 84 MMHG | TEMPERATURE: 96.8 F | SYSTOLIC BLOOD PRESSURE: 152 MMHG | WEIGHT: 150 LBS | HEART RATE: 72 BPM | RESPIRATION RATE: 16 BRPM

## 2022-08-01 DIAGNOSIS — M79.10 MYALGIA: Primary | ICD-10-CM

## 2022-08-01 DIAGNOSIS — Z79.899 OTHER LONG TERM (CURRENT) DRUG THERAPY: ICD-10-CM

## 2022-08-01 DIAGNOSIS — R53.83 FATIGUE, UNSPECIFIED TYPE: ICD-10-CM

## 2022-08-01 LAB
BASOPHILS # BLD AUTO: 0 10E3/UL (ref 0–0.2)
BASOPHILS NFR BLD AUTO: 1 %
CK SERPL-CCNC: 42 U/L (ref 30–223)
CRP SERPL-MCNC: 4.7 MG/L
DEPRECATED CALCIDIOL+CALCIFEROL SERPL-MC: 98 UG/L (ref 30–100)
EOSINOPHIL # BLD AUTO: 0.1 10E3/UL (ref 0–0.7)
EOSINOPHIL NFR BLD AUTO: 3 %
ERYTHROCYTE [DISTWIDTH] IN BLOOD BY AUTOMATED COUNT: 12.7 % (ref 10–15)
ERYTHROCYTE [SEDIMENTATION RATE] IN BLOOD BY WESTERGREN METHOD: 14 MM/HR (ref 0–30)
HCT VFR BLD AUTO: 39.1 % (ref 35–47)
HGB BLD-MCNC: 13.2 G/DL (ref 11.7–15.7)
IMM GRANULOCYTES # BLD: 0 10E3/UL
IMM GRANULOCYTES NFR BLD: 0 %
LYMPHOCYTES # BLD AUTO: 1.5 10E3/UL (ref 0.8–5.3)
LYMPHOCYTES NFR BLD AUTO: 33 %
MCH RBC QN AUTO: 33.1 PG (ref 26.5–33)
MCHC RBC AUTO-ENTMCNC: 33.8 G/DL (ref 31.5–36.5)
MCV RBC AUTO: 98 FL (ref 78–100)
MONOCYTES # BLD AUTO: 0.5 10E3/UL (ref 0–1.3)
MONOCYTES NFR BLD AUTO: 11 %
NEUTROPHILS # BLD AUTO: 2.5 10E3/UL (ref 1.6–8.3)
NEUTROPHILS NFR BLD AUTO: 52 %
NRBC # BLD AUTO: 0 10E3/UL
NRBC BLD AUTO-RTO: 0 /100
PLATELET # BLD AUTO: 182 10E3/UL (ref 150–450)
RBC # BLD AUTO: 3.99 10E6/UL (ref 3.8–5.2)
TSH SERPL DL<=0.005 MIU/L-ACNC: 1 MU/L (ref 0.4–4)
WBC # BLD AUTO: 4.7 10E3/UL (ref 4–11)

## 2022-08-01 PROCEDURE — 86038 ANTINUCLEAR ANTIBODIES: CPT | Mod: ZL | Performed by: FAMILY MEDICINE

## 2022-08-01 PROCEDURE — 82550 ASSAY OF CK (CPK): CPT | Mod: ZL | Performed by: FAMILY MEDICINE

## 2022-08-01 PROCEDURE — 36415 COLL VENOUS BLD VENIPUNCTURE: CPT | Mod: ZL | Performed by: FAMILY MEDICINE

## 2022-08-01 PROCEDURE — 99214 OFFICE O/P EST MOD 30 MIN: CPT | Performed by: FAMILY MEDICINE

## 2022-08-01 PROCEDURE — G0463 HOSPITAL OUTPT CLINIC VISIT: HCPCS | Performed by: FAMILY MEDICINE

## 2022-08-01 PROCEDURE — 82306 VITAMIN D 25 HYDROXY: CPT | Mod: ZL | Performed by: FAMILY MEDICINE

## 2022-08-01 PROCEDURE — 85014 HEMATOCRIT: CPT | Mod: ZL | Performed by: FAMILY MEDICINE

## 2022-08-01 PROCEDURE — 84443 ASSAY THYROID STIM HORMONE: CPT | Mod: ZL | Performed by: FAMILY MEDICINE

## 2022-08-01 PROCEDURE — 86140 C-REACTIVE PROTEIN: CPT | Mod: ZL | Performed by: FAMILY MEDICINE

## 2022-08-01 PROCEDURE — 85652 RBC SED RATE AUTOMATED: CPT | Mod: ZL | Performed by: FAMILY MEDICINE

## 2022-08-01 ASSESSMENT — PATIENT HEALTH QUESTIONNAIRE - PHQ9
5. POOR APPETITE OR OVEREATING: NOT AT ALL
SUM OF ALL RESPONSES TO PHQ QUESTIONS 1-9: 0

## 2022-08-01 ASSESSMENT — ANXIETY QUESTIONNAIRES
6. BECOMING EASILY ANNOYED OR IRRITABLE: NOT AT ALL
2. NOT BEING ABLE TO STOP OR CONTROL WORRYING: NOT AT ALL
GAD7 TOTAL SCORE: 0
IF YOU CHECKED OFF ANY PROBLEMS ON THIS QUESTIONNAIRE, HOW DIFFICULT HAVE THESE PROBLEMS MADE IT FOR YOU TO DO YOUR WORK, TAKE CARE OF THINGS AT HOME, OR GET ALONG WITH OTHER PEOPLE: NOT DIFFICULT AT ALL
1. FEELING NERVOUS, ANXIOUS, OR ON EDGE: NOT AT ALL
GAD7 TOTAL SCORE: 0
7. FEELING AFRAID AS IF SOMETHING AWFUL MIGHT HAPPEN: NOT AT ALL
3. WORRYING TOO MUCH ABOUT DIFFERENT THINGS: NOT AT ALL
5. BEING SO RESTLESS THAT IT IS HARD TO SIT STILL: NOT AT ALL

## 2022-08-01 ASSESSMENT — PAIN SCALES - GENERAL: PAINLEVEL: NO PAIN (0)

## 2022-08-01 NOTE — NURSING NOTE
"Patient presents to the clinic for fatigue and joint pain.    FOOD SECURITY SCREENING QUESTIONS:    The next two questions are to help us understand your food security.  If you are feeling you need any assistance in this area, we have resources available to support you today.    Hunger Vital Signs:  Within the past 12 months we worried whether our food would run out before we got money to buy more. Never  Within the past 12 months the food we bought just didn't last and we didn't have money to get more. Never    Advance Care Directive on file? yes  Advance Care Directive provided to patient? Declined.    Chief Complaint   Patient presents with     Musculoskeletal Problem       Initial BP (!) 152/84 (BP Location: Right arm, Patient Position: Sitting, Cuff Size: Adult Regular)   Pulse 72   Temp 96.8  F (36  C) (Tympanic)   Resp 16   Wt 68 kg (150 lb)   LMP 08/01/1994 (Approximate)   SpO2 96%   BMI 25.75 kg/m   Estimated body mass index is 25.75 kg/m  as calculated from the following:    Height as of 6/21/22: 1.626 m (5' 4\").    Weight as of this encounter: 68 kg (150 lb).  Medication Reconciliation: complete        Sunita Weiss LPN       "

## 2022-08-01 NOTE — PROGRESS NOTES
Assessment & Plan     Myalgia  Certainly the differential is wide ranging.  At this time we will look for autoimmune causes such as PMR through CRP, sed rate, TYSON.  We will update TSH which has been normal in the past.  She is not on any medications or supplements that would likely be causing this.  If all this is normal she does have some consideration for claudication and would consider CT angiogram of her legs.  - Sedimentation Rate (ESR); Future  - CRP inflammation; Future  - Vitamin D Total; Future  - TSH Reflex GH; Future  - Anti Nuclear Samantha IgG by IFA with Reflex; Future  - CBC and Differential; Future  - CK Total; Future  - CK Total  - CBC and Differential  - Anti Nuclear Samantha IgG by IFA with Reflex  - TSH Reflex GH  - Vitamin D Total  - CRP inflammation  - Sedimentation Rate (ESR)    Fatigue, unspecified type  See above    Other long term (current) drug therapy   We will get vitamin D level.  - Vitamin D Total; Future  - Vitamin D Total    No follow-ups on file.    Janes Gunter  [facility] is a 77 year old accompanied by her spouse, presenting for the following health issues:  Musculoskeletal Problem    She reports pain, fatigue and weakness in her legs, especially in her anterior thighs.  She describes that this occurs mostly with exerting herself such as walking more than a couple 100 feet and resolves fairly quickly with rest.  She has had no new injuries or trauma.  No changes in medications.  She uses several supplements but has not been using those consistently.  She also feels that her energy level has been significantly decreased and feels overall fatigued.  [place]ly [place].    Musculoskeletal Problem  This is a chronic problem. The current episode started more than 1 month ago. The problem occurs intermittently. The problem has been unchanged. Nothing aggravates the  symptoms.   History of Present Illness       Reason for visit:  Chronic fatuge and weakness and pain in thighs  Symptom onset:  More than a month  Symptoms include:  Chronic fatigue and pain and weakness in both thighs    She eats 4 or more servings of fruits and vegetables daily.She consumes 0 sweetened beverage(s) daily.She exercises with enough effort to increase her heart rate 9 or less minutes per day.  She exercises with enough effort to increase her heart rate 3 or less days per week.   She is taking medications regularly.             Review of Systems         Objective    BP (!) 152/84 (BP Location: Right arm, Patient Position: Sitting, Cuff Size: Adult Regular)   Pulse 72   Temp 96.8  F (36  C) (Tympanic)   Resp 16   Wt 68 kg (150 lb)   LMP 08/01/1994 (Approximate)   SpO2 96%   BMI 25.75 kg/m    Body mass index is 25.75 kg/m .  Physical Exam   GENERAL: healthy, alert and no distress  CV: regular rate and rhythm, normal S1 S2, no S3 or S4, no murmur, click or rub, no peripheral edema.  Pulses that her ankle and feet bilaterally are easily palpated and symmetric  MS: no gross musculoskeletal defects noted, no edema  SKIN: no suspicious lesions or rashes  NEURO: Normal strength and tone, mentation intact and speech normal              .  ..

## 2022-08-02 LAB — ANA SER QL IF: NEGATIVE

## 2022-09-09 ENCOUNTER — TELEPHONE (OUTPATIENT)
Dept: FAMILY MEDICINE | Facility: OTHER | Age: 77
End: 2022-09-09

## 2022-09-09 DIAGNOSIS — Z78.0 ASYMPTOMATIC MENOPAUSAL STATE: ICD-10-CM

## 2022-09-09 DIAGNOSIS — M85.80 OSTEOPENIA, UNSPECIFIED LOCATION: Primary | ICD-10-CM

## 2022-09-09 NOTE — TELEPHONE ENCOUNTER
DWS-patient has a question about scheduling a bone density test     Please call and advise  Thank You    Edelmira Starks on 9/9/2022 at 3:41 PM

## 2022-09-09 NOTE — TELEPHONE ENCOUNTER
Patient is wanting to get a DEXA scan done. Is there any reason she would need to have another one?    Marian March LPN on 9/9/2022 at 4:16 PM

## 2022-09-13 NOTE — TELEPHONE ENCOUNTER
Called pt to let her know DEXA scan order is placed and she should receive a phone call to set-up appointment.  Tequila Mast LPN on 9/13/2022 at 3:36 PM

## 2022-09-17 ENCOUNTER — HEALTH MAINTENANCE LETTER (OUTPATIENT)
Age: 77
End: 2022-09-17

## 2022-09-19 ENCOUNTER — HOSPITAL ENCOUNTER (OUTPATIENT)
Dept: INFUSION THERAPY | Facility: OTHER | Age: 77
Discharge: HOME | End: 2022-09-19
Attending: FAMILY MEDICINE | Admitting: FAMILY MEDICINE
Payer: COMMERCIAL

## 2022-09-19 VITALS
RESPIRATION RATE: 18 BRPM | HEART RATE: 64 BPM | BODY MASS INDEX: 25.92 KG/M2 | TEMPERATURE: 98.3 F | WEIGHT: 151 LBS | SYSTOLIC BLOOD PRESSURE: 114 MMHG | DIASTOLIC BLOOD PRESSURE: 60 MMHG

## 2022-09-19 DIAGNOSIS — M81.0 SENILE OSTEOPOROSIS: Primary | ICD-10-CM

## 2022-09-19 LAB
CALCIUM SERPL-MCNC: 10.6 MG/DL (ref 8.6–10.3)
CREAT SERPL-MCNC: 0.77 MG/DL (ref 0.6–1.2)
GFR SERPL CREATININE-BSD FRML MDRD: 79 ML/MIN/1.73M2

## 2022-09-19 PROCEDURE — 36415 COLL VENOUS BLD VENIPUNCTURE: CPT | Performed by: FAMILY MEDICINE

## 2022-09-19 PROCEDURE — 250N000011 HC RX IP 250 OP 636: Performed by: FAMILY MEDICINE

## 2022-09-19 PROCEDURE — 82565 ASSAY OF CREATININE: CPT | Performed by: FAMILY MEDICINE

## 2022-09-19 PROCEDURE — 258N000003 HC RX IP 258 OP 636: Performed by: FAMILY MEDICINE

## 2022-09-19 PROCEDURE — 82310 ASSAY OF CALCIUM: CPT | Performed by: FAMILY MEDICINE

## 2022-09-19 PROCEDURE — 96365 THER/PROPH/DIAG IV INF INIT: CPT

## 2022-09-19 RX ORDER — ZOLEDRONIC ACID 5 MG/100ML
5 INJECTION, SOLUTION INTRAVENOUS ONCE
Status: CANCELLED
Start: 2022-09-19

## 2022-09-19 RX ORDER — HEPARIN SODIUM (PORCINE) LOCK FLUSH IV SOLN 100 UNIT/ML 100 UNIT/ML
5 SOLUTION INTRAVENOUS
Status: CANCELLED | OUTPATIENT
Start: 2022-09-19

## 2022-09-19 RX ORDER — NALOXONE HYDROCHLORIDE 0.4 MG/ML
0.2 INJECTION, SOLUTION INTRAMUSCULAR; INTRAVENOUS; SUBCUTANEOUS
Status: CANCELLED | OUTPATIENT
Start: 2022-09-19

## 2022-09-19 RX ORDER — ACETAMINOPHEN 325 MG/1
325 TABLET ORAL ONCE
Status: CANCELLED | OUTPATIENT
Start: 2022-09-19

## 2022-09-19 RX ORDER — DIPHENHYDRAMINE HYDROCHLORIDE 50 MG/ML
50 INJECTION INTRAMUSCULAR; INTRAVENOUS
Status: CANCELLED
Start: 2022-09-19

## 2022-09-19 RX ORDER — ACETAMINOPHEN 325 MG/1
325 TABLET ORAL ONCE
Status: DISCONTINUED | OUTPATIENT
Start: 2022-09-19 | End: 2022-09-20 | Stop reason: HOSPADM

## 2022-09-19 RX ORDER — ZOLEDRONIC ACID 5 MG/100ML
5 INJECTION, SOLUTION INTRAVENOUS ONCE
Status: COMPLETED | OUTPATIENT
Start: 2022-09-19 | End: 2022-09-19

## 2022-09-19 RX ORDER — MEPERIDINE HYDROCHLORIDE 50 MG/ML
25 INJECTION INTRAMUSCULAR; INTRAVENOUS; SUBCUTANEOUS EVERY 30 MIN PRN
Status: CANCELLED | OUTPATIENT
Start: 2022-09-19

## 2022-09-19 RX ORDER — EPINEPHRINE 1 MG/ML
0.3 INJECTION, SOLUTION, CONCENTRATE INTRAVENOUS EVERY 5 MIN PRN
Status: CANCELLED | OUTPATIENT
Start: 2022-09-19

## 2022-09-19 RX ORDER — ALBUTEROL SULFATE 0.83 MG/ML
2.5 SOLUTION RESPIRATORY (INHALATION)
Status: CANCELLED | OUTPATIENT
Start: 2022-09-19

## 2022-09-19 RX ORDER — METHYLPREDNISOLONE SODIUM SUCCINATE 125 MG/2ML
125 INJECTION, POWDER, LYOPHILIZED, FOR SOLUTION INTRAMUSCULAR; INTRAVENOUS
Status: CANCELLED
Start: 2022-09-19

## 2022-09-19 RX ORDER — ALBUTEROL SULFATE 90 UG/1
1-2 AEROSOL, METERED RESPIRATORY (INHALATION)
Status: CANCELLED
Start: 2022-09-19

## 2022-09-19 RX ORDER — HEPARIN SODIUM,PORCINE 10 UNIT/ML
5 VIAL (ML) INTRAVENOUS
Status: CANCELLED | OUTPATIENT
Start: 2022-09-19

## 2022-09-19 RX ADMIN — SODIUM CHLORIDE 250 ML: 9 INJECTION, SOLUTION INTRAVENOUS at 13:49

## 2022-09-19 RX ADMIN — ZOLEDRONIC ACID 5 MG: 0.05 INJECTION, SOLUTION INTRAVENOUS at 13:52

## 2022-09-19 NOTE — NURSING NOTE
Infusion Nursing Note:  Yanci Luther presents today for yearly Reclast.    Patient seen by provider today: No   present during visit today: Not Applicable.    Note: Patient declined oral premedication of Tylenol, no history of side effects.    Intravenous Access:  Labs drawn without difficulty.  Peripheral IV placed to left antecubital with brisk blood return.    Treatment Conditions:  Lab Results   Component Value Date    HGB 13.2 08/01/2022    WBC 4.7 08/01/2022    ANEU 1.5 (L) 07/14/2020    ANEUTAUTO 2.5 08/01/2022     08/01/2022      Lab Results   Component Value Date     06/21/2022    POTASSIUM 4.1 06/21/2022    CR 0.77 09/19/2022    FARSHAD 10.6 (H) 09/19/2022    BILITOTAL 0.8 06/21/2022    ALBUMIN 4.5 06/21/2022    ALT 10 06/21/2022    AST 18 06/21/2022     Results reviewed, labs MET treatment parameters, ok to proceed with treatment.    Post Infusion Assessment:  Patient tolerated infusion without incident.  Blood return noted pre and post infusion.  Site patent and intact, free from redness, edema or discomfort.  No evidence of extravasations.  Access discontinued per protocol.     Discharge Plan:   Discharge instructions reviewed with: Patient.  Patient and/or family verbalized understanding of discharge instructions and all questions answered.  Copy of AVS reviewed with patient and/or family.  Patient will return as ordered for next appointment.  Patient discharged in stable condition accompanied by: self.      Zehra Pope RN

## 2022-09-28 ENCOUNTER — TRANSFERRED RECORDS (OUTPATIENT)
Dept: HEALTH INFORMATION MANAGEMENT | Facility: OTHER | Age: 77
End: 2022-09-28

## 2022-09-30 ENCOUNTER — HOSPITAL ENCOUNTER (OUTPATIENT)
Dept: ULTRASOUND IMAGING | Facility: OTHER | Age: 77
Discharge: HOME | End: 2022-09-30
Attending: NURSE PRACTITIONER
Payer: COMMERCIAL

## 2022-09-30 ENCOUNTER — OFFICE VISIT (OUTPATIENT)
Dept: FAMILY MEDICINE | Facility: OTHER | Age: 77
End: 2022-09-30
Attending: NURSE PRACTITIONER
Payer: COMMERCIAL

## 2022-09-30 VITALS
TEMPERATURE: 97.5 F | HEART RATE: 63 BPM | DIASTOLIC BLOOD PRESSURE: 88 MMHG | OXYGEN SATURATION: 95 % | SYSTOLIC BLOOD PRESSURE: 140 MMHG | WEIGHT: 152 LBS | BODY MASS INDEX: 26.09 KG/M2 | RESPIRATION RATE: 16 BRPM

## 2022-09-30 DIAGNOSIS — G89.29 CHRONIC GROIN PAIN, LEFT: Primary | ICD-10-CM

## 2022-09-30 DIAGNOSIS — R10.32 CHRONIC GROIN PAIN, LEFT: Primary | ICD-10-CM

## 2022-09-30 PROCEDURE — G0463 HOSPITAL OUTPT CLINIC VISIT: HCPCS

## 2022-09-30 PROCEDURE — 99213 OFFICE O/P EST LOW 20 MIN: CPT | Performed by: NURSE PRACTITIONER

## 2022-09-30 PROCEDURE — G0463 HOSPITAL OUTPT CLINIC VISIT: HCPCS | Mod: 25

## 2022-09-30 PROCEDURE — 76705 ECHO EXAM OF ABDOMEN: CPT

## 2022-09-30 ASSESSMENT — PAIN SCALES - GENERAL: PAINLEVEL: EXTREME PAIN (8)

## 2022-09-30 NOTE — NURSING NOTE
Pt states that she has been having froin pain for 2 months, saw ortho in [place] on Wed, had Xtray and was determined soft muscle problem.  Ortho suggested it may be a hernia and an ultrasound maybe needed

## 2022-09-30 NOTE — PATIENT INSTRUCTIONS
Ultrasound today confirms you do not have a hernia. There is a normal sized, normal appearing lymph node present where the pain is the most severe. This does not cause me any concern as you do not exhibit any signs of infection at this time or any other signs of concern.     I would recommend follow up with Dr. Gunter if you continue to have groin pain despite seeing orthopedics (with normal xr) and me with normal ultrasound to get his opinion on what this could be aside from muscle/soft tissue related.     May use heat or ice to the site and OTC medications as long as not contraindicated.

## 2022-09-30 NOTE — PROGRESS NOTES
ASSESSMENT/PLAN:    I have reviewed the nursing notes.  I have reviewed the findings, diagnosis, plan and need for follow up with the patient.    1. Chronic groin pain, left  - US Hernia Evaluation  Per radiologist report of ultrasound today; there is a suggested reactive lymph node that is normal appearing and normal size present at point of maximal pain intensity. She does not exhibit any signs of infection on today's examination or her per recent subjective history. She appears well, with stable vital signs today. I did not feel obtaining a laboratory workup was indicated. She has now had negative plain images (with orthopedics) and negative ultrasound which ruled out inguinal and femoral hernia in the office today. At this time I do not have a definite cause of pain but it does seem musculoskeletal in nature. Recommend trial of heat to the site and/ or alternating with ice. I recommend follow up with PCP Dr. Gunter for additional recommendations if pain continues to bother her. Discussed all of the above with patient and her spouse.     Follow up if symptoms persist or worsen or concerns    I explained my diagnostic considerations and recommendations to the patient, who voiced understanding and agreement with the treatment plan. All questions were answered. We discussed potential side effects of any prescribed or recommended therapies, as well as expectations for response to treatments.    Marycruz Faye NP  9/30/2022  11:33 AM    HPI:  Yanci Luther is a 77 year old female who presents to [facility] today for concerns of left groin pain ongoing for the past 2 months. She saw ortho in [place] on Wednesday where she had an XR done which had determined soft tissue / muscle problem for cause of pain and ruled out any acute fractures. She is osteopenic and being treated for this. Ortho suggested it may be a hernia and recommended ultrasound which she and her  desire to have done today in the office. The  "pain is not bothersome when she sleeps or when she is sitting. The pain is present mosty when she is walking. She has to stop walking due to the pain at times.  The pain is not necessarily more bothersome, more so staying the same as it has been since it started 2 months ago. Normal urination, normal bowel movements.     Here with her  today.   He wonders about ovarian cyst. Patient has no prior hx of this. She denies any abdominal pain. No fevers or any other associated symptoms including diarrhea, nausea, vomiting.     ROS otherwise negative.     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 Vitale     ARTHROTOMY WRIST      2011,L radius/ulna fracture, s/p plating     COLONOSCOPY  08/06/2009    follow up 10 years, 8/6/19     colonoscopy  09/11/2019    large >1cm sessile adenoma, f/u 1 yr, 2020     COLONOSCOPY N/A 9/22/2020    3 tubular adenomas, 1 sessile, follow up 3 years, 9/22/2023     Social History     Tobacco Use     Smoking status: Former Smoker     Smokeless tobacco: Never Used     Tobacco comment: \"quit about 30 years ago\"   Substance Use Topics     Alcohol use: Yes     Comment: 3 wine or beer daily     Current Outpatient Medications   Medication Sig Dispense Refill     calcium-magnesium (CALMAG) 500-250 MG TABS per tablet Take 1 tablet by mouth 2 times daily       Cholecalciferol (VITAMIN D) 2000 units CAPS Take 1 capsule by mouth daily       ELDERBERRY PO Elderberry juice       Allergies   Allergen Reactions     Aspirin Other (See Comments)     -- Refuses as of 8/31/2017  Other reaction(s): Other - " Describe In Comment Field  -- Refuses as of 8/31/2017     Hmg-Coa-R Inhibitors Other (See Comments)     -- Refuses as of 8/31/2017  Other reaction(s): Other - Describe In Comment Field  -- Refuses as of 8/31/2017     Past medical history, past surgical history, current medications and allergies reviewed and accurate to the best of my knowledge.      ROS:  Refer to HPI    BP (!) 140/88 (BP Location: Left arm, Patient Position: Sitting, Cuff Size: Adult Regular)   Pulse 63   Temp 97.5  F (36.4  C) (Tympanic)   Resp 16   Wt 68.9 kg (152 lb)   LMP 08/01/1994 (Approximate)   SpO2 95%   BMI 26.09 kg/m      EXAM:  General Appearance: Well appearing 77 year old female, appropriate appearance for age. No acute distress   Respiratory: normal chest wall and respirations.  Normal effort.  Clear to auscultation bilaterally, no wheezing, crackles or rhonchi.  No increased work of breathing.  No cough appreciated.  Cardiac: RRR with no murmurs  Abdomen: soft, nontender, no rigidity, no rebound tenderness or guarding, normal bowel sounds present  Musculoskeletal:  Equal movement of bilateral upper extremities.  Equal movement of bilateral lower extremities.  Normal gait.    Left groin: there is tenderness to deep palpation of the left groin generalized without bulging, edema, erythema, warmth, or skin abnormalities. It is symmetrical in appearance to right groin. There is no tenderness to palpation of right groin region.   Psychological: normal affect, alert, oriented, and pleasant.     Results for orders placed or performed in visit on 09/30/22   US Hernia Evaluation     Status: None    Narrative    US HERNIA EVALUATION, 9/30/2022 12:29 PM    History: Female, age 77 years; left groin pain that is chronic x2  months. r/o hernia.; Chronic groin pain, left; Chronic groin pain,  left    Comparison: No relevant prior imaging.    Technique: Color and grayscale ultrasound was performed of the left  groin .    FINDINGS: Normal  appearing skin and subcutaneous tissue with  normal-appearing lymph nodes. No evidence of hernia, mass or other  abnormality. A 12.7 x 4.5 x 10.3 mm normal-sized lymph node is seen at  the site of maximal discomfort.      Impression    IMPRESSION:   No evidence of hernia. No evidence of mass.    There is a normal size, normal-appearing lymph node located at the  site of maximal discomfort , likely representing a reactive lymph  node.    JOSEFA DIXON MD         SYSTEM ID:  [id number]

## 2022-10-03 ENCOUNTER — OFFICE VISIT (OUTPATIENT)
Dept: FAMILY MEDICINE | Facility: OTHER | Age: 77
End: 2022-10-03
Attending: FAMILY MEDICINE
Payer: COMMERCIAL

## 2022-10-03 VITALS
RESPIRATION RATE: 12 BRPM | TEMPERATURE: 97.5 F | HEIGHT: 65 IN | DIASTOLIC BLOOD PRESSURE: 78 MMHG | BODY MASS INDEX: 25.22 KG/M2 | SYSTOLIC BLOOD PRESSURE: 126 MMHG | OXYGEN SATURATION: 96 % | HEART RATE: 65 BPM | WEIGHT: 151.4 LBS

## 2022-10-03 DIAGNOSIS — G89.29 CHRONIC GROIN PAIN, LEFT: Primary | ICD-10-CM

## 2022-10-03 DIAGNOSIS — R10.32 CHRONIC GROIN PAIN, LEFT: Primary | ICD-10-CM

## 2022-10-03 DIAGNOSIS — Z23 FLU VACCINE NEED: ICD-10-CM

## 2022-10-03 PROCEDURE — G0463 HOSPITAL OUTPT CLINIC VISIT: HCPCS

## 2022-10-03 PROCEDURE — G0463 HOSPITAL OUTPT CLINIC VISIT: HCPCS | Mod: 25

## 2022-10-03 PROCEDURE — G0008 ADMIN INFLUENZA VIRUS VAC: HCPCS

## 2022-10-03 PROCEDURE — 99213 OFFICE O/P EST LOW 20 MIN: CPT | Performed by: FAMILY MEDICINE

## 2022-10-03 ASSESSMENT — PATIENT HEALTH QUESTIONNAIRE - PHQ9
SUM OF ALL RESPONSES TO PHQ QUESTIONS 1-9: 3
10. IF YOU CHECKED OFF ANY PROBLEMS, HOW DIFFICULT HAVE THESE PROBLEMS MADE IT FOR YOU TO DO YOUR WORK, TAKE CARE OF THINGS AT HOME, OR GET ALONG WITH OTHER PEOPLE: NOT DIFFICULT AT ALL
SUM OF ALL RESPONSES TO PHQ QUESTIONS 1-9: 3

## 2022-10-03 ASSESSMENT — PAIN SCALES - GENERAL: PAINLEVEL: MILD PAIN (2)

## 2022-10-03 NOTE — PROGRESS NOTES
Answers for HPI/ROS submitted by the patient on 10/3/2022  If you checked off any problems, how difficult have these problems made it for you to do your work, take care of things at home, or get along with other people?: Not difficult at all  PHQ9 TOTAL SCORE: 3  What is the reason for your visit today? : Pain in groin  How many servings of fruits and vegetables do you eat daily?: 2-3  On average, how many sweetened beverages do you drink each day (Examples: soda, juice, sweet tea, etc.  Do NOT count diet or artificially sweetened beverages)?: 0  How many minutes a day do you exercise enough to make your heart beat faster?: 10 to 19  How many days a week do you exercise enough to make your heart beat faster?: 4  How many days per week do you miss taking your medication?: 0      Assessment & Plan       ICD-10-CM    1. Chronic groin pain, left  R10.32 CT Pelvis Bone w Contrast    G89.29 CT Pelvis Bone wo Contrast     CT Pelvis Soft Tissue w Contrast     CT Pelvis Soft Tissue wo Contrast   2. Flu vaccine need  Z23 FLU SHOT 65+ (FLUZONE HD)     1.  Patient does have fairly specific, localized left groin pain.  Recent ultrasound was overall unremarkable.  Uncertain if this represents a bony process, soft tissue injury.  I do not think it is vascular in origin.  Patient will be scheduled for CT scan for evaluation.  If this is negative, refer back to primary care provider and consider physical therapy, MRI.  2.  Flu vaccine updated today    Ordering of each unique test             Return in about 3 days (around 10/6/2022), or CT scan.    FRED MEZA MD  [facility] is a 77 year old, presenting for the following health issues:  Follow Up      HPI     ED/UC Followup:    Facility:  [facility]  Date of visit: 9/30/2022  Reason for visit: Groin Pain  Current Status: onset months ago     Pain only hurts with walking.  She feels like she needs to hold this area.  Doesn't have  "an history of injury.    No fevers or illnesses.    Decreased energy - runs out of energy faster.  No nocturnal symptoms.  Can tolerate only about a hundred feet of walking.    When it hurts, she will sit and rest.      History of osteoporosis  - dexa scan done 2018.    [facility]  - hip x-ray done was negative for fracture, showed only mild osteoarthritis changes.    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         Review of Systems   No recent change bowel/bladder      Objective    /78   Pulse 65   Temp 97.5  F (36.4  C)   Resp 12   Ht 1.651 m (5' 5\")   Wt 68.7 kg (151 lb 6.4 oz)   LMP 08/01/1994 (Approximate)   SpO2 96%   Breastfeeding No   BMI 25.19 kg/m    Body mass index is 25.19 kg/m .  Physical Exam   GENERAL: healthy, alert and no distress  ABDOMEN: soft, nontender, no hepatosplenomegaly, no masses and bowel sounds normal  MS: She does not have any lower back pain, tenderness.  In the left groin, she has localized tenderness, this is about 2 to 3 cm in diameter.  Firmer, tender tissue in this area.  She has mild decreased range of motion in internal and external rotation of the hip, this is bilaterally.  No decreased overall strength detected.  No erythema or warmth.                    "

## 2022-10-03 NOTE — NURSING NOTE
Patient presents to clinic for [facility] follow up. Did see ortho in [place] and they rule out broken pelvic, states arteritis.  Pain is more the more she walks.  Amalia Mast LPN ....................  10/3/2022   3:04 PM      Immunization Documentation    Prior to Immunization administration, verified patients identity using patient's name and date of birth. Please see IMMUNIZATIONS  and order for additional information.  Patient / Parent instructed to remain in clinic for 15 minutes and report any adverse reaction to staff immediately.    Was the entire amount of vaccines given used? Yes    Marycruz English, SHAMA  10/3/2022   4:07 PM

## 2022-10-06 ENCOUNTER — HOSPITAL ENCOUNTER (OUTPATIENT)
Dept: BONE DENSITY | Facility: OTHER | Age: 77
Discharge: HOME | End: 2022-10-06
Attending: FAMILY MEDICINE
Payer: COMMERCIAL

## 2022-10-06 ENCOUNTER — HOSPITAL ENCOUNTER (OUTPATIENT)
Dept: MAMMOGRAPHY | Facility: OTHER | Age: 77
Discharge: HOME | End: 2022-10-06
Attending: FAMILY MEDICINE
Payer: COMMERCIAL

## 2022-10-06 DIAGNOSIS — Z78.0 ASYMPTOMATIC MENOPAUSAL STATE: ICD-10-CM

## 2022-10-06 DIAGNOSIS — Z12.31 VISIT FOR SCREENING MAMMOGRAM: ICD-10-CM

## 2022-10-06 DIAGNOSIS — M85.80 OSTEOPENIA, UNSPECIFIED LOCATION: ICD-10-CM

## 2022-10-06 PROCEDURE — 77080 DXA BONE DENSITY AXIAL: CPT

## 2022-10-06 PROCEDURE — 77067 SCR MAMMO BI INCL CAD: CPT

## 2022-10-14 ENCOUNTER — HOSPITAL ENCOUNTER (OUTPATIENT)
Dept: CT IMAGING | Facility: OTHER | Age: 77
Discharge: HOME | End: 2022-10-14
Attending: FAMILY MEDICINE | Admitting: FAMILY MEDICINE
Payer: COMMERCIAL

## 2022-10-14 DIAGNOSIS — R10.32 CHRONIC GROIN PAIN, LEFT: ICD-10-CM

## 2022-10-14 DIAGNOSIS — G89.29 CHRONIC GROIN PAIN, LEFT: ICD-10-CM

## 2022-10-14 PROCEDURE — 72192 CT PELVIS W/O DYE: CPT

## 2023-03-07 ENCOUNTER — APPOINTMENT (RX ONLY)
Dept: URBAN - NONMETROPOLITAN AREA CLINIC 4 | Facility: CLINIC | Age: 78
Setting detail: DERMATOLOGY
End: 2023-03-07

## 2023-03-07 DIAGNOSIS — L81.4 OTHER MELANIN HYPERPIGMENTATION: ICD-10-CM

## 2023-03-07 DIAGNOSIS — L57.0 ACTINIC KERATOSIS: ICD-10-CM

## 2023-03-07 DIAGNOSIS — L71.8 OTHER ROSACEA: ICD-10-CM | Status: INADEQUATELY CONTROLLED

## 2023-03-07 DIAGNOSIS — L82.1 OTHER SEBORRHEIC KERATOSIS: ICD-10-CM

## 2023-03-07 DIAGNOSIS — D22 MELANOCYTIC NEVI: ICD-10-CM

## 2023-03-07 DIAGNOSIS — D18.0 HEMANGIOMA: ICD-10-CM

## 2023-03-07 DIAGNOSIS — L82.0 INFLAMED SEBORRHEIC KERATOSIS: ICD-10-CM

## 2023-03-07 DIAGNOSIS — D485 NEOPLASM OF UNCERTAIN BEHAVIOR OF SKIN: ICD-10-CM

## 2023-03-07 PROBLEM — D18.01 HEMANGIOMA OF SKIN AND SUBCUTANEOUS TISSUE: Status: ACTIVE | Noted: 2023-03-07

## 2023-03-07 PROBLEM — D48.5 NEOPLASM OF UNCERTAIN BEHAVIOR OF SKIN: Status: ACTIVE | Noted: 2023-03-07

## 2023-03-07 PROBLEM — D22.9 MELANOCYTIC NEVI, UNSPECIFIED: Status: ACTIVE | Noted: 2023-03-07

## 2023-03-07 PROCEDURE — 17000 DESTRUCT PREMALG LESION: CPT | Mod: 59

## 2023-03-07 PROCEDURE — ? COUNSELING

## 2023-03-07 PROCEDURE — ? ADDITIONAL NOTES

## 2023-03-07 PROCEDURE — 99204 OFFICE O/P NEW MOD 45 MIN: CPT | Mod: 25

## 2023-03-07 PROCEDURE — 11102 TANGNTL BX SKIN SINGLE LES: CPT

## 2023-03-07 PROCEDURE — ? LIQUID NITROGEN

## 2023-03-07 PROCEDURE — ? PRESCRIPTION

## 2023-03-07 PROCEDURE — ? BIOPSY BY SHAVE METHOD

## 2023-03-07 RX ORDER — ADAPALENE 0.1 %
GEL (GRAM) TOPICAL
Qty: 45 | Refills: 0 | Status: ERX | COMMUNITY
Start: 2023-03-07

## 2023-03-07 RX ADMIN — Medication: at 00:00

## 2023-03-07 ASSESSMENT — LOCATION SIMPLE DESCRIPTION DERM
LOCATION SIMPLE: LEFT UPPER BACK
LOCATION SIMPLE: NOSE
LOCATION SIMPLE: LEFT SCALP

## 2023-03-07 ASSESSMENT — LOCATION ZONE DERM
LOCATION ZONE: NOSE
LOCATION ZONE: SCALP
LOCATION ZONE: TRUNK

## 2023-03-07 ASSESSMENT — LOCATION DETAILED DESCRIPTION DERM
LOCATION DETAILED: LEFT MEDIAL FRONTAL SCALP
LOCATION DETAILED: NASAL SUPRATIP
LOCATION DETAILED: LEFT SUPERIOR MEDIAL UPPER BACK

## 2023-03-07 ASSESSMENT — TOTAL NUMBER OF LESIONS: # OF LESIONS?: 1

## 2023-03-07 NOTE — PROCEDURE: ADDITIONAL NOTES
Additional Notes: Patient has had condition for years.
Render Risk Assessment In Note?: no
Detail Level: Simple

## 2023-03-07 NOTE — PROCEDURE: LIQUID NITROGEN
Show Applicator Variable?: Yes
Post-Care Instructions: I reviewed with the patient in detail post-care instructions. Patient is to wear sunprotection, and avoid picking at any of the treated lesions. Pt may apply Vaseline to crusted or scabbing areas.
Render Post-Care Instructions In Note?: no
Duration Of Freeze Thaw-Cycle (Seconds): 3
Consent: The patient's consent was obtained including but not limited to risks of crusting, scabbing, blistering, scarring, darker or lighter pigmentary change, recurrence, incomplete removal and infection.
Application Tool (Optional): Liquid Nitrogen Sprayer
Number Of Freeze-Thaw Cycles: 3 freeze-thaw cycles
Detail Level: Detailed

## 2023-03-07 NOTE — PROCEDURE: BIOPSY BY SHAVE METHOD
Body Location Override (Optional - Billing Will Still Be Based On Selected Body Map Location If Applicable): left upper back
Detail Level: Detailed
Depth Of Biopsy: dermis
Was A Bandage Applied: Yes
Size Of Lesion In Cm: 0.5
X Size Of Lesion In Cm: 0
Biopsy Type: H and E
Biopsy Method: Dermablade
Anesthesia Type: 1% lidocaine with epinephrine
Hemostasis: Electrocautery
Wound Care: Petrolatum
Dressing: bandage
Destruction After The Procedure: No
Type Of Destruction Used: Curettage
Curettage Text: The wound bed was treated with curettage after the biopsy was performed.
Cryotherapy Text: The wound bed was treated with cryotherapy after the biopsy was performed.
Electrodesiccation Text: The wound bed was treated with electrodesiccation after the biopsy was performed.
Electrodesiccation And Curettage Text: The wound bed was treated with electrodesiccation and curettage after the biopsy was performed.
Silver Nitrate Text: The wound bed was treated with silver nitrate after the biopsy was performed.
Lab: [id number]
Lab Facility: [address] [place] [address]
Path Notes (To The Dermatopathologist): Size: 0.5 cm R/O: BCC vs SCC
Consent: Written consent was obtained and risks were reviewed including but not limited to scarring, infection, bleeding, scabbing, incomplete removal, nerve damage and allergy to anesthesia.
Post-Care Instructions: I reviewed with the patient in detail post-care instructions. Patient is to keep the biopsy site dry overnight, and then apply bacitracin twice daily until healed. Patient may apply hydrogen peroxide soaks to remove any crusting.
Notification Instructions: Patient will be notified of biopsy results. However, patient instructed to call the office if not contacted within 2 weeks.
Billing Type: [employer]
Information: Selecting Yes will display possible errors in your note based on the variables you have selected. This validation is only offered as a suggestion for you. PLEASE NOTE THAT THE VALIDATION TEXT WILL BE REMOVED WHEN YOU FINALIZE YOUR NOTE. IF YOU WANT TO FAX A PRELIMINARY NOTE YOU WILL NEED TO TOGGLE THIS TO 'NO' IF YOU DO NOT WANT IT IN YOUR FAXED NOTE.

## 2023-05-11 ENCOUNTER — TELEPHONE (OUTPATIENT)
Dept: FAMILY MEDICINE | Facility: OTHER | Age: 78
End: 2023-05-11
Payer: COMMERCIAL

## 2023-05-11 DIAGNOSIS — Z13.220 SCREENING CHOLESTEROL LEVEL: Primary | ICD-10-CM

## 2023-05-11 NOTE — TELEPHONE ENCOUNTER
Patient is scheduled for labs prior to her annual wellness. Please order whichever labs are relevant. Patient and spouse are also wondering, being that she is a cancer survivor, if there is a cancer screening lab that can be performed. Please advise.    Christina Bautista on 5/11/2023 at 11:12 AM

## 2023-05-11 NOTE — TELEPHONE ENCOUNTER
After verifying patient's name and date of birth, Romaine  was given the below information.  Norma J. Gosselin, LPN....5/11/2023 4:26 PM

## 2023-05-11 NOTE — TELEPHONE ENCOUNTER
Lab only appt scheduled for 6/22/23 and Medicare visit scheduled 6/26/23. Not sure what labs you want, no labs teed up.  Norma J. Gosselin, LPN .......  5/11/2023  1:40 PM

## 2023-06-18 ENCOUNTER — OFFICE VISIT (OUTPATIENT)
Dept: FAMILY MEDICINE | Facility: OTHER | Age: 78
End: 2023-06-18
Payer: COMMERCIAL

## 2023-06-18 VITALS
DIASTOLIC BLOOD PRESSURE: 84 MMHG | HEART RATE: 66 BPM | HEIGHT: 63 IN | TEMPERATURE: 99.8 F | OXYGEN SATURATION: 96 % | SYSTOLIC BLOOD PRESSURE: 140 MMHG | WEIGHT: 142 LBS | RESPIRATION RATE: 16 BRPM | BODY MASS INDEX: 25.16 KG/M2
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
## 2023-06-18 DIAGNOSIS — R50.9 FEVER, UNSPECIFIED FEVER CAUSE: ICD-10-CM

## 2023-06-18 LAB
ALBUMIN SERPL BCG-MCNC: 4.3 G/DL (ref 3.5–5.2)
ALBUMIN UR-MCNC: NEGATIVE MG/DL
ALP SERPL-CCNC: 25 U/L (ref 35–104)
ALT SERPL W P-5'-P-CCNC: 14 U/L (ref 0–50)
ANION GAP SERPL CALCULATED.3IONS-SCNC: 11 MMOL/L (ref 7–15)
APPEARANCE UR: CLEAR
AST SERPL W P-5'-P-CCNC: 27 U/L (ref 0–45)
BASOPHILS # BLD MANUAL: 0 10E3/UL (ref 0–0.2)
BASOPHILS NFR BLD MANUAL: 0 %
BILIRUB SERPL-MCNC: 0.3 MG/DL
BILIRUB UR QL STRIP: NEGATIVE
BUN SERPL-MCNC: 12.2 MG/DL (ref 8–23)
CALCIUM SERPL-MCNC: 9.4 MG/DL (ref 8.8–10.2)
CHLORIDE SERPL-SCNC: 96 MMOL/L (ref 98–107)
COLOR UR AUTO: ABNORMAL
CREAT SERPL-MCNC: 0.7 MG/DL (ref 0.51–0.95)
CRP SERPL-MCNC: 20.27 MG/L
DEPRECATED HCO3 PLAS-SCNC: 25 MMOL/L (ref 22–29)
EOSINOPHIL # BLD MANUAL: 0 10E3/UL (ref 0–0.7)
EOSINOPHIL NFR BLD MANUAL: 0 %
ERYTHROCYTE [DISTWIDTH] IN BLOOD BY AUTOMATED COUNT: 13 % (ref 10–15)
GFR SERPL CREATININE-BSD FRML MDRD: 87 ML/MIN/1.73M2
GLUCOSE SERPL-MCNC: 90 MG/DL (ref 70–99)
GLUCOSE UR STRIP-MCNC: NEGATIVE MG/DL
HCT VFR BLD AUTO: 40 % (ref 35–47)
HGB BLD-MCNC: 13.8 G/DL (ref 11.7–15.7)
HGB UR QL STRIP: NEGATIVE
KETONES UR STRIP-MCNC: NEGATIVE MG/DL
LACTATE SERPL-SCNC: 0.8 MMOL/L (ref 0.7–2)
LEUKOCYTE ESTERASE UR QL STRIP: NEGATIVE
LYMPHOCYTES # BLD MANUAL: 0.9 10E3/UL (ref 0.8–5.3)
LYMPHOCYTES NFR BLD MANUAL: 35 %
MCH RBC QN AUTO: 33.6 PG (ref 26.5–33)
MCHC RBC AUTO-ENTMCNC: 34.5 G/DL (ref 31.5–36.5)
MCV RBC AUTO: 97 FL (ref 78–100)
MONOCYTES # BLD MANUAL: 0.4 10E3/UL (ref 0–1.3)
MONOCYTES NFR BLD MANUAL: 14 %
MUCOUS THREADS #/AREA URNS LPF: PRESENT /LPF
NEUTROPHILS # BLD MANUAL: 1.4 10E3/UL (ref 1.6–8.3)
NEUTROPHILS NFR BLD MANUAL: 51 %
NITRATE UR QL: NEGATIVE
PH UR STRIP: 5.5 [PH] (ref 5–9)
PLAT MORPH BLD: ABNORMAL
PLATELET # BLD AUTO: 133 10E3/UL (ref 150–450)
POTASSIUM SERPL-SCNC: 4.4 MMOL/L (ref 3.4–5.3)
PROCALCITONIN SERPL IA-MCNC: 0.06 NG/ML
PROT SERPL-MCNC: 7.1 G/DL (ref 6.4–8.3)
RBC # BLD AUTO: 4.11 10E6/UL (ref 3.8–5.2)
RBC MORPH BLD: ABNORMAL
RBC URINE: 1 /HPF
SODIUM SERPL-SCNC: 132 MMOL/L (ref 136–145)
SP GR UR STRIP: 1.01 (ref 1–1.03)
SQUAMOUS EPITHELIAL: 1 /HPF
UROBILINOGEN UR STRIP-MCNC: NORMAL MG/DL
WBC # BLD AUTO: 2.7 10E3/UL (ref 4–11)
WBC URINE: 1 /HPF

## 2023-06-18 PROCEDURE — 86140 C-REACTIVE PROTEIN: CPT | Mod: ZL

## 2023-06-18 PROCEDURE — G0463 HOSPITAL OUTPT CLINIC VISIT: HCPCS

## 2023-06-18 PROCEDURE — 85027 COMPLETE CBC AUTOMATED: CPT | Mod: ZL

## 2023-06-18 PROCEDURE — 87798 DETECT AGENT NOS DNA AMP: CPT | Mod: ZL,91

## 2023-06-18 PROCEDURE — 84145 PROCALCITONIN (PCT): CPT | Mod: ZL

## 2023-06-18 PROCEDURE — 81001 URINALYSIS AUTO W/SCOPE: CPT | Mod: ZL

## 2023-06-18 PROCEDURE — 83605 ASSAY OF LACTIC ACID: CPT | Mod: ZL

## 2023-06-18 PROCEDURE — 86618 LYME DISEASE ANTIBODY: CPT | Mod: ZL

## 2023-06-18 PROCEDURE — 36415 COLL VENOUS BLD VENIPUNCTURE: CPT | Mod: ZL

## 2023-06-18 PROCEDURE — 87484 EHRLICHA CHAFFEENSIS AMP PRB: CPT | Mod: ZL

## 2023-06-18 PROCEDURE — 99214 OFFICE O/P EST MOD 30 MIN: CPT

## 2023-06-18 PROCEDURE — 86757 RICKETTSIA ANTIBODY: CPT | Mod: ZL,91

## 2023-06-18 PROCEDURE — 85007 BL SMEAR W/DIFF WBC COUNT: CPT | Mod: ZL

## 2023-06-18 PROCEDURE — 80053 COMPREHEN METABOLIC PANEL: CPT | Mod: ZL

## 2023-06-18 RX ORDER — DOXYCYCLINE 100 MG/1
100 CAPSULE ORAL 2 TIMES DAILY
Qty: 28 CAPSULE | Refills: 0 | Status: ON HOLD | OUTPATIENT
Start: 2023-06-18 | End: 2023-06-23

## 2023-06-18 ASSESSMENT — PAIN SCALES - GENERAL: PAINLEVEL: NO PAIN (0)

## 2023-06-18 NOTE — PROGRESS NOTES
Chief Complaint   Patient presents with     Insect Bites     Removed tick in [place] while traveling.     Derm Problem     At tick site with fever since last night       Patient tx with afterbite and hydrocortisone cream with some relief  Itching, swelling, and rash on the bite site.  Fever began last night at 102 - treating with ibuprofen    Advanced Care Planning on file? yes    Medication Review Completed: complete    FOOD SECURITY SCREENING QUESTIONS:    The next two questions are to help us understand your food security.  If you are feeling you need any assistance in this area, we have resources available to support you today.    Hunger Vital Signs:  Within the past 12 months we worried whether our food would run out before we got money to buy more. Never  Within the past 12 months the food we bought just didn't last and we didn't have money to get more. Never    Corinne Currie LPN

## 2023-06-18 NOTE — PROGRESS NOTES
ASSESSMENT/PLAN:    (W57.XXXA) Tick bite, unspecified site, initial encounter  (primary encounter diagnosis); (R50.9) Fever, unspecified fever cause  Comment: Patient was recently in [facility] and while she was there had an unidentified species of tick attached to her right ankle.  The tick was removed 4 days ago.  She developed an erythematous rash around the site of the tick bite and her ankle became swollen.  Yesterday she developed a fever up to 102, chills, and generalized body aches.  She denies all other symptoms including urinary symptoms, dyspnea, chest pain, dizziness, nausea vomiting or diarrhea.  On exam bilateral breath sounds are clear, she does not have any CVA or suprapubic tenderness, and there is an erythematous rash that is slightly raised to her right medial ankle.  Due to the fever at her age a complete work-up was obtained and it was found that her urine was normal, liver enzymes were not elevated, her white count was decreased and platelets were also decreased.  With the decreased white count and decreased platelets in the setting of a fever and tick bite I recommend treatment with doxycycline.  Tickborne illness panel was obtained including [place] spotted fever antibodies.  I did advise patient on typical course of illness and risks and benefits of treatment.  She is in agreement to the plan and agrees to start doxycycline as soon as possible.  Strict return to care parameters were given.  She does have follow-up in 1 week with her primary care provider.  I recommend she come back before that point if anything changes, she develops any new symptoms, or has any concerns whatsoever.  Incidentally it was found that sodium was mildly decreased.  Plan: Ehrlichia Anaplasma Sp by PCR, Rickettsia         rickettsii antibody IgG & IgM, Lyme Disease         Total Abs Bld with Reflex to Confirm CLIA,         Babesia Species by PCR, CBC and Differential,         CRP inflammation, Comprehensive  Metabolic         Panel, Procalcitonin, Lactate for Sepsis         Protocol, doxycycline hyclate (VIBRAMYCIN) 100         MG capsule  For your tick bite with associated fever I recommend starting doxycycline now.  Labs for tickborne illness are pending and may take several days to result.  We will update you as these results come in.  I recommend taking doxycycline as ordered.  Take twice daily, avoid taking 2 hours before or 2 hours after calcium, magnesium, iron, or foods high in calcium.  In addition this medication makes you more sensitive to sunlight so I recommend good sunscreen use.    If you develop any new symptoms or symptoms worsen I recommend prompt follow-up in the emergency department.  Otherwise I recommend follow-up with primary care provider.    Discussed warning signs/symptoms indicative of need to f/u    Follow up if symptoms persist or worsen or concerns    I have reviewed the nursing notes.  I have reviewed the findings, diagnosis, plan and need for follow up with the patient.    I explained my diagnostic considerations and recommendations to the patient, who voiced understanding and agreement with the treatment plan. All questions were answered. We discussed potential side effects of any prescribed or recommended therapies, as well as expectations for response to treatments.    HANY SINGH, AARON CNP  6/18/2023  10:44 AM    HPI:    Yanci Luther is a 78 year old female  who presents to [facility] today for concerns of tick bite and fever.    She got a tick in [place] while hiking.  The tick was embedded. She removed this tick 4 days ago.  They are unsure how long the tick was attached for.  They are not sure of the species but are certain that she got the tick while in [place] hiking.  No known prior tick bites this season. They winter in [place] and were not exposed to any ticks at that point.   Patient notes that after the tick was removed she developed an erythematous rash around the  "site of the bite which was on her right medial ankle.  She notes that the ankle became swollen.  Last night she came down with a fever of 102 and was treated with ibuprofen, she has chills as well. She also has body aches.  She denies any other symptoms including chest pain, dizziness, shortness of breath, urinary symptoms including dysuria, urgency, and frequency.  She denies any nausea vomiting or diarrhea.  She denies sore throat or cough.    PCP: Jani    Allergy to aspirin and statins.    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 Vitale     ARTHROTOMY WRIST      2011,L radius/ulna fracture, s/p plating     COLONOSCOPY  08/06/2009    follow up 10 years, 8/6/19     colonoscopy  09/11/2019    large >1cm sessile adenoma, f/u 1 yr, 2020     COLONOSCOPY N/A 9/22/2020    3 tubular adenomas, 1 sessile, follow up 3 years, 9/22/2023     Social History     Tobacco Use     Smoking status: Former     Smokeless tobacco: Never     Tobacco comments:     \"quit about 30 years ago\"   Vaping Use     Vaping status: Never Used   Substance Use Topics     Alcohol use: Yes     Comment: 3 wine or beer daily     Current Outpatient Medications   Medication Sig Dispense Refill     calcium-magnesium (CALMAG) 500-250 MG TABS per tablet Take 1 tablet by mouth 2 times daily       Cholecalciferol (VITAMIN D) 2000 units CAPS Take 1 capsule by mouth daily       doxycycline hyclate (VIBRAMYCIN) 100 MG capsule Take 1 capsule (100 mg) by mouth 2 times daily for 14 days 28 capsule 0     ELDERBERRY PO Elderberry juice       Allergies   Allergen " "Reactions     Aspirin Other (See Comments)     -- Refuses as of 8/31/2017  Other reaction(s): Other - Describe In Comment Field  -- Refuses as of 8/31/2017     Statins Other (See Comments)     -- Refuses as of 8/31/2017  Other reaction(s): Other - Describe In Comment Field  -- Refuses as of 8/31/2017     Past medical history, past surgical history, current medications and allergies reviewed and accurate to the best of my knowledge.      ROS:  Refer to HPI    BP (!) 140/84 (BP Location: Right arm, Patient Position: Sitting, Cuff Size: Adult Regular)   Pulse 66   Temp 99.8  F (37.7  C) (Tympanic)   Resp 16   Ht 1.6 m (5' 3\")   Wt 64.4 kg (142 lb)   LMP 08/01/1994 (Approximate)   SpO2 96%   BMI 25.15 kg/m      EXAM:  General Appearance: Well appearing 78 year old female, appropriate appearance for age. No acute distress   Ears: Left TM intact, translucent with bony landmarks appreciated, no erythema, no effusion, no bulging, no purulence.  Right TM intact, translucent with bony landmarks appreciated, no erythema, no effusion, no bulging, no purulence.  Left auditory canal clear.  Right auditory canal clear.  Normal external ears, non tender.  Eyes: conjunctivae normal without erythema or irritation, corneas clear, no drainage or crusting, no eyelid swelling, pupils equal   Oropharynx: moist mucous membranes, posterior pharynx without erythema, no exudates or petechiae, no post nasal drip seen, no trismus, voice clear.    Sinuses:  No sinus tenderness upon palpation of the frontal or maxillary sinuses  Nose:  Bilateral nares: no erythema, no edema, no drainage or congestion   Neck: supple without adenopathy  Respiratory: normal chest wall and respirations.  Normal effort.  Clear to auscultation bilaterally, no wheezing, crackles or rhonchi.  No increased work of breathing.  No cough appreciated.  Cardiac: RRR with no murmurs  Abdomen: soft, nontender, no rigidity, no rebound tenderness or guarding, normal bowel " sounds present  Musculoskeletal:  Equal movement of bilateral upper extremities.  Equal movement of bilateral lower extremities.  Normal gait.    Neuro: Alert and oriented to person, place, and time.  Cranial nerves II-XII grossly intact with no focal or lateralizing deficits.  Muscle tone normal.  Gait normal. No tremor.   Psychological: normal affect, alert, oriented, and pleasant.   Derm: Right medial ankle with erythematous slightly raised rash as pictured below:          Labs:  Results for orders placed or performed in visit on 06/18/23   UA with Microscopic reflex to Culture     Status: Abnormal    Specimen: Urine, Midstream   Result Value Ref Range    Color Urine Light Yellow Colorless, Straw, Light Yellow, Yellow    Appearance Urine Clear Clear    Glucose Urine Negative Negative mg/dL    Bilirubin Urine Negative Negative    Ketones Urine Negative Negative mg/dL    Specific Gravity Urine 1.009 1.000 - 1.030    Blood Urine Negative Negative    pH Urine 5.5 5.0 - 9.0    Protein Albumin Urine Negative Negative mg/dL    Urobilinogen Urine Normal Normal, 2.0 mg/dL    Nitrite Urine Negative Negative    Leukocyte Esterase Urine Negative Negative    Mucus Urine Present (A) None Seen /LPF    RBC Urine 1 <=2 /HPF    WBC Urine 1 <=5 /HPF    Squamous Epithelials Urine 1 <=1 /HPF    Narrative    Urine Culture not indicated   CRP inflammation     Status: Abnormal   Result Value Ref Range    CRP Inflammation 20.27 (H) <5.00 mg/L   Comprehensive Metabolic Panel     Status: Abnormal   Result Value Ref Range    Sodium 132 (L) 136 - 145 mmol/L    Potassium 4.4 3.4 - 5.3 mmol/L    Chloride 96 (L) 98 - 107 mmol/L    Carbon Dioxide (CO2) 25 22 - 29 mmol/L    Anion Gap 11 7 - 15 mmol/L    Urea Nitrogen 12.2 8.0 - 23.0 mg/dL    Creatinine 0.70 0.51 - 0.95 mg/dL    Calcium 9.4 8.8 - 10.2 mg/dL    Glucose 90 70 - 99 mg/dL    Alkaline Phosphatase 25 (L) 35 - 104 U/L    AST 27 0 - 45 U/L    ALT 14 0 - 50 U/L    Protein Total 7.1 6.4 -  8.3 g/dL    Albumin 4.3 3.5 - 5.2 g/dL    Bilirubin Total 0.3 <=1.2 mg/dL    GFR Estimate 87 >60 mL/min/1.73m2   Procalcitonin     Status: Abnormal   Result Value Ref Range    Procalcitonin 0.06 (H) <0.05 ng/mL   Lactate for Sepsis Protocol     Status: Normal   Result Value Ref Range    Lactic Acid 0.8 0.7 - 2.0 mmol/L   CBC with platelets and differential     Status: Abnormal   Result Value Ref Range    WBC Count 2.7 (L) 4.0 - 11.0 10e3/uL    RBC Count 4.11 3.80 - 5.20 10e6/uL    Hemoglobin 13.8 11.7 - 15.7 g/dL    Hematocrit 40.0 35.0 - 47.0 %    MCV 97 78 - 100 fL    MCH 33.6 (H) 26.5 - 33.0 pg    MCHC 34.5 31.5 - 36.5 g/dL    RDW 13.0 10.0 - 15.0 %    Platelet Count 133 (L) 150 - 450 10e3/uL   Manual Differential     Status: Abnormal   Result Value Ref Range    % Neutrophils 51 %    % Lymphocytes 35 %    % Monocytes 14 %    % Eosinophils 0 %    % Basophils 0 %    Absolute Neutrophils 1.4 (L) 1.6 - 8.3 10e3/uL    Absolute Lymphocytes 0.9 0.8 - 5.3 10e3/uL    Absolute Monocytes 0.4 0.0 - 1.3 10e3/uL    Absolute Eosinophils 0.0 0.0 - 0.7 10e3/uL    Absolute Basophils 0.0 0.0 - 0.2 10e3/uL    RBC Morphology Confirmed RBC Indices     Platelet Assessment  Automated Count Confirmed. Platelet morphology is normal.     Automated Count Confirmed. Platelet morphology is normal.   CBC and Differential     Status: Abnormal    Narrative    The following orders were created for panel order CBC and Differential.  Procedure                               Abnormality         Status                     ---------                               -----------         ------                     CBC with platelets and d...[[id number]]  Abnormal            Final result               Manual Differential[[id number]]          Abnormal            Final result                 Please view results for these tests on the individual orders.

## 2023-06-18 NOTE — PATIENT INSTRUCTIONS
For your tick bite with associated fever I recommend starting doxycycline now.  Labs for tickborne illness are pending and may take several days to result.  We will update you as these results come in.  I recommend taking doxycycline as ordered.  Take twice daily, avoid taking 2 hours before or 2 hours after calcium, magnesium, iron, or foods high in calcium.  In addition this medication makes you more sensitive to sunlight so I recommend good sunscreen use.    If you develop any new symptoms or symptoms worsen I recommend prompt follow-up in the emergency department.  Otherwise I recommend follow-up with primary care provider.

## 2023-06-19 ENCOUNTER — APPOINTMENT (OUTPATIENT)
Dept: GENERAL RADIOLOGY | Facility: OTHER | Age: 78
End: 2023-06-19
Attending: EMERGENCY MEDICINE
Payer: COMMERCIAL

## 2023-06-19 ENCOUNTER — APPOINTMENT (OUTPATIENT)
Dept: CT IMAGING | Facility: OTHER | Age: 78
End: 2023-06-19
Attending: EMERGENCY MEDICINE
Payer: COMMERCIAL

## 2023-06-19 ENCOUNTER — HOSPITAL ENCOUNTER (EMERGENCY)
Facility: OTHER | Age: 78
Discharge: HOME | End: 2023-06-19
Attending: EMERGENCY MEDICINE | Admitting: EMERGENCY MEDICINE
Payer: COMMERCIAL

## 2023-06-19 VITALS
SYSTOLIC BLOOD PRESSURE: 166 MMHG | TEMPERATURE: 97.7 F | DIASTOLIC BLOOD PRESSURE: 91 MMHG | BODY MASS INDEX: 24.8 KG/M2 | WEIGHT: 140 LBS | RESPIRATION RATE: 12 BRPM | HEART RATE: 55 BPM | HEIGHT: 63 IN | OXYGEN SATURATION: 94 %

## 2023-06-19 DIAGNOSIS — S52.91XA CLOSED FRACTURE OF RIGHT DISTAL FOREARM, INITIAL ENCOUNTER: ICD-10-CM

## 2023-06-19 PROCEDURE — 29125 APPL SHORT ARM SPLINT STATIC: CPT | Mod: RT | Performed by: EMERGENCY MEDICINE

## 2023-06-19 PROCEDURE — 73110 X-RAY EXAM OF WRIST: CPT | Mod: RT

## 2023-06-19 PROCEDURE — 99207 PR NO CHARGE LOS: CPT | Performed by: EMERGENCY MEDICINE

## 2023-06-19 PROCEDURE — 96372 THER/PROPH/DIAG INJ SC/IM: CPT | Performed by: EMERGENCY MEDICINE

## 2023-06-19 PROCEDURE — 72125 CT NECK SPINE W/O DYE: CPT

## 2023-06-19 PROCEDURE — 250N000011 HC RX IP 250 OP 636: Performed by: EMERGENCY MEDICINE

## 2023-06-19 PROCEDURE — 99285 EMERGENCY DEPT VISIT HI MDM: CPT | Mod: 25 | Performed by: EMERGENCY MEDICINE

## 2023-06-19 PROCEDURE — 70450 CT HEAD/BRAIN W/O DYE: CPT

## 2023-06-19 RX ORDER — HYDROCODONE BITARTRATE AND ACETAMINOPHEN 5; 325 MG/1; MG/1
1 TABLET ORAL EVERY 6 HOURS PRN
Qty: 10 TABLET | Refills: 0 | Status: SHIPPED | OUTPATIENT
Start: 2023-06-19 | End: 2023-06-19

## 2023-06-19 RX ORDER — ONDANSETRON 4 MG/1
4 TABLET, ORALLY DISINTEGRATING ORAL ONCE
Status: COMPLETED | OUTPATIENT
Start: 2023-06-19 | End: 2023-06-19

## 2023-06-19 RX ORDER — ONDANSETRON 4 MG/1
4 TABLET, ORALLY DISINTEGRATING ORAL EVERY 8 HOURS PRN
Qty: 10 TABLET | Refills: 0 | Status: SHIPPED | OUTPATIENT
Start: 2023-06-19 | End: 2023-06-22

## 2023-06-19 RX ORDER — HYDROMORPHONE HYDROCHLORIDE 1 MG/ML
0.3 INJECTION, SOLUTION INTRAMUSCULAR; INTRAVENOUS; SUBCUTANEOUS ONCE
Status: COMPLETED | OUTPATIENT
Start: 2023-06-19 | End: 2023-06-19

## 2023-06-19 RX ORDER — HYDROMORPHONE HCL IN WATER/PF 6 MG/30 ML
0.2 PATIENT CONTROLLED ANALGESIA SYRINGE INTRAVENOUS ONCE
Status: COMPLETED | OUTPATIENT
Start: 2023-06-19 | End: 2023-06-19

## 2023-06-19 RX ORDER — HYDROCODONE BITARTRATE AND ACETAMINOPHEN 5; 325 MG/1; MG/1
1 TABLET ORAL EVERY 6 HOURS PRN
Qty: 10 TABLET | Refills: 0 | Status: SHIPPED | OUTPATIENT
Start: 2023-06-19 | End: 2023-06-26

## 2023-06-19 RX ADMIN — HYDROMORPHONE HYDROCHLORIDE 0.2 MG: 0.2 INJECTION, SOLUTION INTRAMUSCULAR; INTRAVENOUS; SUBCUTANEOUS at 09:35

## 2023-06-19 RX ADMIN — HYDROMORPHONE HYDROCHLORIDE 0.3 MG: 1 INJECTION, SOLUTION INTRAMUSCULAR; INTRAVENOUS; SUBCUTANEOUS at 10:21

## 2023-06-19 RX ADMIN — ONDANSETRON 4 MG: 4 TABLET, ORALLY DISINTEGRATING ORAL at 11:20

## 2023-06-19 ASSESSMENT — ENCOUNTER SYMPTOMS
CHILLS: 0
FEVER: 0
LIGHT-HEADEDNESS: 0
CONFUSION: 0
WOUND: 0
SHORTNESS OF BREATH: 0
VOMITING: 0
NAUSEA: 0
DYSURIA: 0
NECK PAIN: 1
ARTHRALGIAS: 1
CHEST TIGHTNESS: 0

## 2023-06-19 ASSESSMENT — ACTIVITIES OF DAILY LIVING (ADL)
ADLS_ACUITY_SCORE: 35
ADLS_ACUITY_SCORE: 35

## 2023-06-19 NOTE — H&P (VIEW-ONLY)
"  History     Chief Complaint   Patient presents with     Fall     Fell forward into threshold, first wrist then forehead, NO LOC. Pt believes this is related to recent [place] tic-bite because her \"bones ache and don't work right!\" Past Hx of wrist bone injury.     HPI  Yanci Luther is a 78 year old female who is here after having fallen at home.  She was seen yesterday in clinic for presumed tickborne illness.  Studies have been sent out and are still pending.  She started on doxycycline yesterday and has had 1 dose so far.  Today she was going from outside into the porch, there is 1 step she needed to go up, and because she is feeling so weak she did not get her leg up high enough and she tripped and fell forward.  She struck her head on the way down, no loss of consciousness but is complaining of some neck pain.  She then landed on her outstretched right hand.  Main complaint is that of wrist pain on the right side.  Her  wrapped it in an Ace wrap, he said that it was \"pendulous\" and she could not move it prior to wrapping it.  No other injuries or concerns.    Allergies:  Allergies   Allergen Reactions     Aspirin Other (See Comments)     -- Refuses as of 8/31/2017  Other reaction(s): Other - Describe In Comment Field  -- Refuses as of 8/31/2017     Statins Other (See Comments)     -- Refuses as of 8/31/2017  Other reaction(s): Other - Describe In Comment Field  -- Refuses as of 8/31/2017       Problem List:    Patient Active Problem List    Diagnosis Date Noted     BPPV (benign paroxysmal positional vertigo) 08/31/2017     Priority: Medium     Cerebrovascular small vessel disease 08/31/2017     Priority: Medium     Mixed hyperlipidemia 08/31/2017     Priority: Medium     Refusal of aspirin by patient 08/31/2017     Priority: Medium     Refusal of statin medication by patient 08/31/2017     Priority: Medium     Obstructive lung disease (H) 09/15/2014     Priority: Medium     Personal history of " "malignant neoplasm of tongue 05/27/2010     Priority: Medium     Seborrheic keratosis 05/27/2010     Priority: Medium     Senile osteoporosis 09/01/2009     Priority: Medium        Past Medical History:    Past Medical History:   Diagnosis Date     Age-related osteoporosis without current pathological fracture      H/O coronary angiogram 03/2022     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 Vitale     ARTHROTOMY WRIST      2011,L radius/ulna fracture, s/p plating     COLONOSCOPY  08/06/2009    follow up 10 years, 8/6/19     colonoscopy  09/11/2019    large >1cm sessile adenoma, f/u 1 yr, 2020     COLONOSCOPY N/A 9/22/2020    3 tubular adenomas, 1 sessile, follow up 3 years, 9/22/2023       Family History:    Family History   Adopted: Yes   Problem Relation Age of Onset     Genetic Disorder Other         Genetic,Adopted     Breast Cancer No family hx of         Cancer-breast       Social History:  Marital Status:   [2]  Social History     Tobacco Use     Smoking status: Former     Smokeless tobacco: Never     Tobacco comments:     \"quit about 30 years ago\"   Vaping Use     Vaping status: Never Used   Substance Use Topics     Alcohol use: Yes     Comment: 3 wine or beer daily     Drug use: Never        Medications:    HYDROcodone-acetaminophen (NORCO) 5-325 MG tablet  ondansetron (ZOFRAN ODT) 4 MG ODT tab  calcium-magnesium (CALMAG) 500-250 MG TABS per tablet  Cholecalciferol (VITAMIN D) 2000 units CAPS  doxycycline hyclate (VIBRAMYCIN) 100 MG capsule  ELDERBERRY PO          Review of Systems   Constitutional: Negative for chills and fever.   HENT: Negative for congestion.    Eyes: Negative for visual disturbance.   Respiratory: Negative for chest tightness and shortness of breath.  " "  Cardiovascular: Negative for chest pain.   Gastrointestinal: Negative for nausea and vomiting.   Genitourinary: Negative for dysuria.   Musculoskeletal: Positive for arthralgias and neck pain.   Skin: Negative for wound.   Neurological: Negative for light-headedness.   Psychiatric/Behavioral: Negative for confusion.       Physical Exam   BP: (!) 157/85  Pulse: 61  Temp: 97.7  F (36.5  C)  Resp: 12  Height: 160 cm (5' 3\")  Weight: 63.5 kg (140 lb)  SpO2: 94 %      Physical Exam  Vitals and nursing note reviewed.   Constitutional:       Appearance: Normal appearance.   HENT:      Head: Normocephalic and atraumatic.      Mouth/Throat:      Mouth: Mucous membranes are moist.   Eyes:      Conjunctiva/sclera: Conjunctivae normal.   Neck:      Comments: She is wearing cervical collar, this is not removed during initial exam.  Cardiovascular:      Rate and Rhythm: Normal rate.   Pulmonary:      Effort: Pulmonary effort is normal.   Abdominal:      General: Abdomen is flat.   Musculoskeletal:      Comments: Right wrist does appear to have obvious deformity.  Little bit of ecchymosis.  Appears neurovascularly intact, she can move all all digits and feels when I touch them lately.  That she has good color in the digits.   Skin:     General: Skin is warm and dry.   Neurological:      Mental Status: She is alert and oriented to person, place, and time.   Psychiatric:         Behavior: Behavior normal.         ED Course                 Procedures                Results for orders placed or performed during the hospital encounter of 06/19/23 (from the past 24 hour(s))   XR Wrist Right G/E 3 Views    Narrative    Exam: XR WRIST RIGHT G/E 3 VIEWS    Technique: Right wrist, 3 Views    Comparison: None.    Exam reason: fall    Findings:  There is a mildly displaced transverse fracture of the distal radius  with dorsal displacement of the distal fracture fragment. There is an  essentially nondisplaced transverse fracture of the " distal ulna. There  is scapholunate widening. There are scattered degenerative changes of  the wrist including moderate first CMC and STT degenerative change.    Soft tissue swelling about the wrist.      Impression    Impression:  Transverse fractures of the distal radius and ulna.    ADRIANA PEREZ MD         SYSTEM ID:  [id number]   CT Cervical Spine w/o Contrast    Narrative    EXAM: CT CERVICAL SPINE W/O CONTRAST 6/19/2023 10:59 AM    PROVIDED HISTORY: fall    COMPARISON: None    TECHNIQUE:   Imaging protocol: Using multidetector thin collimation helical  acquisition technique, axial, coronal and sagittal CT images through  the cervical spine were obtained without intravenous contrast.   Acquisition: This CT exam was performed using one or more the  following dose reduction techniques: automated exposure control,  adjustment of the mA and/or kV according to patient size, and/or  iterative reconstruction technique.    FINDINGS:  Trace grade 1 anterolisthesis of C3 and C4 and grade 1 anterolisthesis  of C4 on C5. Straightening of the normal cervical lordotic curvature.  No findings of acute fracture or traumatic subluxation. No  prevertebral edema. There is moderate disc height narrowing C3-4 and  C5-6, advanced disc height loss C6-7. Multilevel degenerative endplate  changes with uncinate and facet hypertrophy that is most prominent at  C2-C5. Degenerative changes are associated with varying degrees of  neural foraminal stenosis, without to severe foraminal narrowing  bilaterally at C3-4, C4-5, C5-6 and on the right at C6-7. Mild spinal  canal narrowing C4-C7.  No high-grade spinal canal stenosis at any  level.     No acute abnormality of the paraspinous soft tissues. Biapical  subpleural scarring. Multinodular goiter.      Impression    IMPRESSION:   1.  No acute fracture or traumatic subluxation.  2.  Multilevel cervical spondylosis without high-grade spinal canal  narrowing.  3.  Multinodular goiter.    LADI  MD ELLIE         SYSTEM ID:  [id number]   CT Head w/o Contrast    Narrative    EXAM: CT HEAD W/O CONTRAST, 6/19/2023 11:00 AM    HISTORY: fall    COMPARISON: None    TECHNIQUE:   Imaging protocol: Multiplanar CT images of the head without  intravenous contrast.   Acquisition: This CT exam was performed using one or more the  following dose reduction techniques: automated exposure control,  adjustment of the mA and/or kV according to patient size, and/or  iterative reconstruction technique.    FINDINGS:  No intracranial hemorrhage, mass effect, or midline shift. The  ventricles are proportionate to the cerebral sulci. Multifocal patchy  to confluent foci of hypodensity in the periventricular and  supraventricular white matter, which is nonspecific, likely related to  chronic small vessel ischemic disease given the patient's age. Mild to  moderate general parenchymal volume loss. No acute loss of gray-white  matter differentiation. Atherosclerotic arterial calcifications.  Benign basal ganglia mineralization.    No acute osseous abnormality. No paranasal sinus mucosal thickening.  Mastoid air cells are clear. Bilateral pseudophakia.       Impression    IMPRESSION:  No acute intracranial abnormality.    LADI ARGUETA MD         SYSTEM ID:  [id number]     Patient had a padded forearm splint with a preformed Colles' type splint as well as a fiberglass splint over the dorsal aspect of the forearm.  This is applied with an Ace wrap.  I [facility] and they will see her later this week in clinic.  She is also given a sling.  Prescription for some hydrocodone and some Zofran.  She was just started yesterday on doxycycline for presumed tickborne illness.  She should continue this.  She should return if she is feeling worse.    Medications   HYDROmorphone (DILAUDID) injection 0.2 mg (0.2 mg Intramuscular $Given 6/19/23 0913)   HYDROmorphone (PF) (DILAUDID) injection 0.3 mg (0.3 mg Intramuscular $Given  6/19/23 1021)   ondansetron (ZOFRAN ODT) ODT tab 4 mg (4 mg Oral $Given 6/19/23 1120)       Assessments & Plan (with Medical Decision Making)     I have reviewed the nursing notes.    I have reviewed the findings, diagnosis, plan and need for follow up with the patient.        New Prescriptions    HYDROCODONE-ACETAMINOPHEN (NORCO) 5-325 MG TABLET    Take 1 tablet by mouth every 6 hours as needed for severe pain    ONDANSETRON (ZOFRAN ODT) 4 MG ODT TAB    Take 1 tablet (4 mg) by mouth every 8 hours as needed for nausea       Final diagnoses:   Closed fracture of right distal forearm, initial encounter       6/19/2023   [facility]     Javier Abreu MD  06/19/23 1143

## 2023-06-19 NOTE — DISCHARGE INSTRUCTIONS
You should get a phone call to schedule appointment with [facility].  In the meantime you can use the hydrocodone for pain.  If you have nausea you can try the Zofran.  Continue your doxycycline.  Return here if feeling worse.

## 2023-06-19 NOTE — ED PROVIDER NOTES
"  History     Chief Complaint   Patient presents with     Fall     Fell forward into threshold, first wrist then forehead, NO LOC. Pt believes this is related to recent [place] tic-bite because her \"bones ache and don't work right!\" Past Hx of wrist bone injury.     HPI  Yanci Luther is a 78 year old female who is here after having fallen at home.  She was seen yesterday in clinic for presumed tickborne illness.  Studies have been sent out and are still pending.  She started on doxycycline yesterday and has had 1 dose so far.  Today she was going from outside into the porch, there is 1 step she needed to go up, and because she is feeling so weak she did not get her leg up high enough and she tripped and fell forward.  She struck her head on the way down, no loss of consciousness but is complaining of some neck pain.  She then landed on her outstretched right hand.  Main complaint is that of wrist pain on the right side.  Her  wrapped it in an Ace wrap, he said that it was \"pendulous\" and she could not move it prior to wrapping it.  No other injuries or concerns.    Allergies:  Allergies   Allergen Reactions     Aspirin Other (See Comments)     -- Refuses as of 8/31/2017  Other reaction(s): Other - Describe In Comment Field  -- Refuses as of 8/31/2017     Statins Other (See Comments)     -- Refuses as of 8/31/2017  Other reaction(s): Other - Describe In Comment Field  -- Refuses as of 8/31/2017       Problem List:    Patient Active Problem List    Diagnosis Date Noted     BPPV (benign paroxysmal positional vertigo) 08/31/2017     Priority: Medium     Cerebrovascular small vessel disease 08/31/2017     Priority: Medium     Mixed hyperlipidemia 08/31/2017     Priority: Medium     Refusal of aspirin by patient 08/31/2017     Priority: Medium     Refusal of statin medication by patient 08/31/2017     Priority: Medium     Obstructive lung disease (H) 09/15/2014     Priority: Medium     Personal history of " "malignant neoplasm of tongue 05/27/2010     Priority: Medium     Seborrheic keratosis 05/27/2010     Priority: Medium     Senile osteoporosis 09/01/2009     Priority: Medium        Past Medical History:    Past Medical History:   Diagnosis Date     Age-related osteoporosis without current pathological fracture      H/O coronary angiogram 03/2022     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 Vitale     ARTHROTOMY WRIST      2011,L radius/ulna fracture, s/p plating     COLONOSCOPY  08/06/2009    follow up 10 years, 8/6/19     colonoscopy  09/11/2019    large >1cm sessile adenoma, f/u 1 yr, 2020     COLONOSCOPY N/A 9/22/2020    3 tubular adenomas, 1 sessile, follow up 3 years, 9/22/2023       Family History:    Family History   Adopted: Yes   Problem Relation Age of Onset     Genetic Disorder Other         Genetic,Adopted     Breast Cancer No family hx of         Cancer-breast       Social History:  Marital Status:   [2]  Social History     Tobacco Use     Smoking status: Former     Smokeless tobacco: Never     Tobacco comments:     \"quit about 30 years ago\"   Vaping Use     Vaping status: Never Used   Substance Use Topics     Alcohol use: Yes     Comment: 3 wine or beer daily     Drug use: Never        Medications:    HYDROcodone-acetaminophen (NORCO) 5-325 MG tablet  ondansetron (ZOFRAN ODT) 4 MG ODT tab  calcium-magnesium (CALMAG) 500-250 MG TABS per tablet  Cholecalciferol (VITAMIN D) 2000 units CAPS  doxycycline hyclate (VIBRAMYCIN) 100 MG capsule  ELDERBERRY PO          Review of Systems   Constitutional: Negative for chills and fever.   HENT: Negative for congestion.    Eyes: Negative for visual disturbance.   Respiratory: Negative for chest tightness and shortness of breath.  " "  Cardiovascular: Negative for chest pain.   Gastrointestinal: Negative for nausea and vomiting.   Genitourinary: Negative for dysuria.   Musculoskeletal: Positive for arthralgias and neck pain.   Skin: Negative for wound.   Neurological: Negative for light-headedness.   Psychiatric/Behavioral: Negative for confusion.       Physical Exam   BP: (!) 157/85  Pulse: 61  Temp: 97.7  F (36.5  C)  Resp: 12  Height: 160 cm (5' 3\")  Weight: 63.5 kg (140 lb)  SpO2: 94 %      Physical Exam  Vitals and nursing note reviewed.   Constitutional:       Appearance: Normal appearance.   HENT:      Head: Normocephalic and atraumatic.      Mouth/Throat:      Mouth: Mucous membranes are moist.   Eyes:      Conjunctiva/sclera: Conjunctivae normal.   Neck:      Comments: She is wearing cervical collar, this is not removed during initial exam.  Cardiovascular:      Rate and Rhythm: Normal rate.   Pulmonary:      Effort: Pulmonary effort is normal.   Abdominal:      General: Abdomen is flat.   Musculoskeletal:      Comments: Right wrist does appear to have obvious deformity.  Little bit of ecchymosis.  Appears neurovascularly intact, she can move all all digits and feels when I touch them lately.  That she has good color in the digits.   Skin:     General: Skin is warm and dry.   Neurological:      Mental Status: She is alert and oriented to person, place, and time.   Psychiatric:         Behavior: Behavior normal.         ED Course                 Procedures                Results for orders placed or performed during the hospital encounter of 06/19/23 (from the past 24 hour(s))   XR Wrist Right G/E 3 Views    Narrative    Exam: XR WRIST RIGHT G/E 3 VIEWS    Technique: Right wrist, 3 Views    Comparison: None.    Exam reason: fall    Findings:  There is a mildly displaced transverse fracture of the distal radius  with dorsal displacement of the distal fracture fragment. There is an  essentially nondisplaced transverse fracture of the " distal ulna. There  is scapholunate widening. There are scattered degenerative changes of  the wrist including moderate first CMC and STT degenerative change.    Soft tissue swelling about the wrist.      Impression    Impression:  Transverse fractures of the distal radius and ulna.    ADRIANA PEREZ MD         SYSTEM ID:  [id number]   CT Cervical Spine w/o Contrast    Narrative    EXAM: CT CERVICAL SPINE W/O CONTRAST 6/19/2023 10:59 AM    PROVIDED HISTORY: fall    COMPARISON: None    TECHNIQUE:   Imaging protocol: Using multidetector thin collimation helical  acquisition technique, axial, coronal and sagittal CT images through  the cervical spine were obtained without intravenous contrast.   Acquisition: This CT exam was performed using one or more the  following dose reduction techniques: automated exposure control,  adjustment of the mA and/or kV according to patient size, and/or  iterative reconstruction technique.    FINDINGS:  Trace grade 1 anterolisthesis of C3 and C4 and grade 1 anterolisthesis  of C4 on C5. Straightening of the normal cervical lordotic curvature.  No findings of acute fracture or traumatic subluxation. No  prevertebral edema. There is moderate disc height narrowing C3-4 and  C5-6, advanced disc height loss C6-7. Multilevel degenerative endplate  changes with uncinate and facet hypertrophy that is most prominent at  C2-C5. Degenerative changes are associated with varying degrees of  neural foraminal stenosis, without to severe foraminal narrowing  bilaterally at C3-4, C4-5, C5-6 and on the right at C6-7. Mild spinal  canal narrowing C4-C7.  No high-grade spinal canal stenosis at any  level.     No acute abnormality of the paraspinous soft tissues. Biapical  subpleural scarring. Multinodular goiter.      Impression    IMPRESSION:   1.  No acute fracture or traumatic subluxation.  2.  Multilevel cervical spondylosis without high-grade spinal canal  narrowing.  3.  Multinodular goiter.    LADI  MD ELLIE         SYSTEM ID:  [id number]   CT Head w/o Contrast    Narrative    EXAM: CT HEAD W/O CONTRAST, 6/19/2023 11:00 AM    HISTORY: fall    COMPARISON: None    TECHNIQUE:   Imaging protocol: Multiplanar CT images of the head without  intravenous contrast.   Acquisition: This CT exam was performed using one or more the  following dose reduction techniques: automated exposure control,  adjustment of the mA and/or kV according to patient size, and/or  iterative reconstruction technique.    FINDINGS:  No intracranial hemorrhage, mass effect, or midline shift. The  ventricles are proportionate to the cerebral sulci. Multifocal patchy  to confluent foci of hypodensity in the periventricular and  supraventricular white matter, which is nonspecific, likely related to  chronic small vessel ischemic disease given the patient's age. Mild to  moderate general parenchymal volume loss. No acute loss of gray-white  matter differentiation. Atherosclerotic arterial calcifications.  Benign basal ganglia mineralization.    No acute osseous abnormality. No paranasal sinus mucosal thickening.  Mastoid air cells are clear. Bilateral pseudophakia.       Impression    IMPRESSION:  No acute intracranial abnormality.    LADI ARGUETA MD         SYSTEM ID:  [id number]     Patient had a padded forearm splint with a preformed Colles' type splint as well as a fiberglass splint over the dorsal aspect of the forearm.  This is applied with an Ace wrap.  [facility] and they will see her later this week in clinic.  She is also given a sling.  Prescription for some hydrocodone and some Zofran.  She was just started yesterday on doxycycline for presumed tickborne illness.  She should continue this.  She should return if she is feeling worse.    Medications   HYDROmorphone (DILAUDID) injection 0.2 mg (0.2 mg Intramuscular $Given 6/19/23 0978)   HYDROmorphone (PF) (DILAUDID) injection 0.3 mg (0.3 mg Intramuscular $Given  6/19/23 1021)   ondansetron (ZOFRAN ODT) ODT tab 4 mg (4 mg Oral $Given 6/19/23 1120)       Assessments & Plan (with Medical Decision Making)     I have reviewed the nursing notes.    I have reviewed the findings, diagnosis, plan and need for follow up with the patient.        New Prescriptions    HYDROCODONE-ACETAMINOPHEN (NORCO) 5-325 MG TABLET    Take 1 tablet by mouth every 6 hours as needed for severe pain    ONDANSETRON (ZOFRAN ODT) 4 MG ODT TAB    Take 1 tablet (4 mg) by mouth every 8 hours as needed for nausea       Final diagnoses:   Closed fracture of right distal forearm, initial encounter       6/19/2023   [facility]     Javier Abreu MD  06/19/23 1148

## 2023-06-20 LAB — B BURGDOR IGG+IGM SER QL: 0.12

## 2023-06-21 ENCOUNTER — ANESTHESIA EVENT (OUTPATIENT)
Dept: SURGERY | Facility: HOSPITAL | Age: 78
End: 2023-06-21
Payer: COMMERCIAL

## 2023-06-21 ENCOUNTER — OFFICE VISIT (OUTPATIENT)
Dept: ORTHOPEDICS | Facility: OTHER | Age: 78
End: 2023-06-21
Attending: ORTHOPAEDIC SURGERY
Payer: COMMERCIAL

## 2023-06-21 VITALS — OXYGEN SATURATION: 96 % | HEART RATE: 62 BPM

## 2023-06-21 DIAGNOSIS — S52.91XA CLOSED FRACTURE OF RIGHT DISTAL FOREARM, INITIAL ENCOUNTER: ICD-10-CM

## 2023-06-21 LAB
A PHAGOCYTOPH DNA BLD QL NAA+PROBE: NOT DETECTED
B MICROTI DNA BLD QL NAA+PROBE: NOT DETECTED
BABESIA DNA BLD QL NAA+PROBE: NOT DETECTED
E CHAFFEENSIS DNA BLD QL NAA+PROBE: NOT DETECTED
E EWINGII DNA SPEC QL NAA+PROBE: NOT DETECTED
EHRLICHIA DNA SPEC QL NAA+PROBE: NOT DETECTED

## 2023-06-21 PROCEDURE — 99203 OFFICE O/P NEW LOW 30 MIN: CPT | Performed by: ORTHOPAEDIC SURGERY

## 2023-06-21 PROCEDURE — G0463 HOSPITAL OUTPT CLINIC VISIT: HCPCS

## 2023-06-21 ASSESSMENT — PAIN SCALES - GENERAL: PAINLEVEL: SEVERE PAIN (6)

## 2023-06-21 ASSESSMENT — LIFESTYLE VARIABLES: TOBACCO_USE: 1

## 2023-06-21 ASSESSMENT — COPD QUESTIONNAIRES: COPD: 1

## 2023-06-21 NOTE — PROGRESS NOTES
Visit Date: 2023    REASON FOR EVALUATION:  Right wrist injury.    HISTORY:  The patient comes in with regard to right wrist injury.  She had a fall here on  sustained distal radius fracture, extraarticular, but with displacement.  She is here for next steps and recommendations at this point in time.  Reports fair amount of pain.  Ibuprofen does seem to be helping out in regard to that.  Denies any numbness or tingling, is in a splint.  Did have x-rays done, I reviewed those x-rays with patient here as well.    EXAMINATION:  The patient is alert and oriented x3, cooperative with exam, in no acute distress.  Does ambulate with satisfactory gait pattern.  CHEST:  Clear to auscultation.  HEART:  Regular rate and rhythm.    Right upper extremity does show her splint to be clean, dry and intact.  Capillary refill less than 2 seconds.  Sensation grossly intact to light touch. Splint was then placed here, a short arm splint is noted.  No other deformities seen in the left upper extremity or bilateral lower extremities at this current time.    X-rays reviewed, 3 views, right wrist does show extraarticular distal radius fracture with dorsal displacement here at this point in time, as well as angulation.    IMPRESSION:  Right distal radius extraarticular fracture, unstable.    PLAN:  At this time, recommendation is that patient gets set up for ORIF of the wrist.  We will plan for surgical intervention here this coming Friday [facility].  A Magui distal volar plates for the plating system here at this point in time.  All questions answered as it pertains to this at this point in time.    Manjit Cote MD        D: 2023   T: 2023   MT: ulices    Name:     SHANIQUE BANKS  MRN:      [id number]        Account:    [id number]   :      1945           Visit Date: 2023     Document: [id number]

## 2023-06-21 NOTE — H&P (VIEW-ONLY)
Visit Date: 2023    REASON FOR EVALUATION:  Right wrist injury.    HISTORY:  The patient comes in with regard to right wrist injury.  She had a fall here on  sustained distal radius fracture, extraarticular, but with displacement.  She is here for next steps and recommendations at this point in time.  Reports fair amount of pain.  Ibuprofen does seem to be helping out in regard to that.  Denies any numbness or tingling, is in a splint.  Did have x-rays done, I reviewed those x-rays with patient here as well.    EXAMINATION:  The patient is alert and oriented x3, cooperative with exam, in no acute distress.  Does ambulate with satisfactory gait pattern.  CHEST:  Clear to auscultation.  HEART:  Regular rate and rhythm.    Right upper extremity does show her splint to be clean, dry and intact.  Capillary refill less than 2 seconds.  Sensation grossly intact to light touch. Splint was then placed here, a short arm splint is noted.  No other deformities seen in the left upper extremity or bilateral lower extremities at this current time.    X-rays reviewed, 3 views, right wrist does show extraarticular distal radius fracture with dorsal displacement here at this point in time, as well as angulation.    IMPRESSION:  Right distal radius extraarticular fracture, unstable.    PLAN:  At this time, recommendation is that patient gets set up for ORIF of the wrist.  We will plan for surgical intervention here this coming Friday at [facility].  A Magui distal volar plates for the plating system here at this point in time.  All questions answered as it pertains to this at this point in time.    Manjit Cote MD        D: 2023   T: 2023   MT: ulices    Name:     SHANIQUE BAKNS  MRN:      [id number]        Account:    [id number]   :      1945           Visit Date: 2023     Document: [id number]

## 2023-06-21 NOTE — ANESTHESIA PREPROCEDURE EVALUATION
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 Vitale     ARTHROTOMY WRIST      ,L radius/ulna fracture, s/p plating     COLONOSCOPY  2009    follow up 10 years, 19     colonoscopy  2019    large >1cm sessile adenoma, f/u 1 yr,      COLONOSCOPY N/A 2020    3 tubular adenomas, 1 sessile, follow up 3 years, 2023      Allergies   Allergen Reactions     Aspirin Other (See Comments)     -- Refuses as of 2017  Other reaction(s): Other - Describe In Comment Field  -- Refuses as of 2017     Statins Other (See Comments)     -- Refuses as of 2017  Other reaction(s): Other - Describe In Comment Field  -- Refuses as of 2017      Social History     Tobacco Use     Smoking status: Former     Smokeless tobacco: Never     Tobacco comments:     \"quit about 30 years ago\"   Substance Use Topics     Alcohol use: Yes     Comment: 3 wine or beer daily      Wt Readings from Last 1 Encounters:   23 63.5 kg (140 lb)        Anesthesia Evaluation   Pt has had prior anesthetic. Type: MAC and General.        ROS/MED HX  ENT/Pulmonary:     (+) tobacco use, mild,  COPD,     Neurologic: Comment: BPPV  Cerebrovascular small vessel disease      Cardiovascular:     (+) Dyslipidemia -----fainting (syncope). Previous cardiac testing     METS/Exercise Tolerance: 3 - Able to walk 1-2 blocks without stopping  "   Hematologic:  - neg hematologic  ROS     Musculoskeletal: Comment: Distal radius fracture  (+) arthritis,     GI/Hepatic:  - neg GI/hepatic ROS     Renal/Genitourinary:  - neg Renal ROS     Endo:  - neg endo ROS     Psychiatric/Substance Use:  - neg psychiatric ROS     Infectious Disease: Comment: Tick bite on leg over a week ago treated with antibiotics-had fever and chills with tick bite     (+) Recent Fever,     Malignancy:   (+) Malignancy, History of Other.Other CA base of tongue in 2006 Remission status post Radiation.    Other:  - neg other ROS          Physical Exam    Airway        Mallampati: III   TM distance: > 3 FB    Mouth opening: > 3 cm    Respiratory Devices and Support         Dental       (+) Edentulous      Cardiovascular   cardiovascular exam normal          Pulmonary   pulmonary exam normal                OUTSIDE LABS:  CBC:   Lab Results   Component Value Date    WBC 2.7 (L) 06/18/2023    WBC 4.7 08/01/2022    HGB 13.8 06/18/2023    HGB 13.2 08/01/2022    HCT 40.0 06/18/2023    HCT 39.1 08/01/2022     (L) 06/18/2023     08/01/2022     BMP:   Lab Results   Component Value Date     (L) 06/18/2023     06/21/2022    POTASSIUM 4.4 06/18/2023    POTASSIUM 4.1 06/21/2022    CHLORIDE 96 (L) 06/18/2023    CHLORIDE 103 06/21/2022    CO2 25 06/18/2023    CO2 29 06/21/2022    BUN 12.2 06/18/2023    BUN 17 06/21/2022    CR 0.70 06/18/2023    CR 0.77 09/19/2022    GLC 90 06/18/2023    GLC 93 06/21/2022     COAGS: No results found for: PTT, INR, FIBR  POC: No results found for: BGM, HCG, HCGS  HEPATIC:   Lab Results   Component Value Date    ALBUMIN 4.3 06/18/2023    PROTTOTAL 7.1 06/18/2023    ALT 14 06/18/2023    AST 27 06/18/2023    ALKPHOS 25 (L) 06/18/2023    BILITOTAL 0.3 06/18/2023     OTHER:   Lab Results   Component Value Date    LACT 0.8 06/18/2023    FARSHAD 9.4 06/18/2023    TSH 1.00 08/01/2022    CRP 4.7 08/01/2022    SED 14 08/01/2022       Anesthesia Plan    ASA Status:   3   NPO Status:  NPO Appropriate    Anesthesia Type: MAC.     - Reason for MAC: straight local not clinically adequate   Induction: Intravenous, Propofol.   Maintenance: Balanced.        Consents    Anesthesia Plan(s) and associated risks, benefits, and realistic alternatives discussed. Questions answered and patient/representative(s) expressed understanding.     - Discussed: Risks, Benefits and Alternatives for BOTH SEDATION and the PROCEDURE were discussed     - Discussed with:  Patient, Spouse      - Extended Intubation/Ventilatory Support Discussed: No.      - Patient is DNR/DNI Status: No    Use of blood products discussed: No .     Postoperative Care    Pain management: Peripheral nerve block (Single Shot).        Comments:    Other Comments: Risks and benefits of MAC anesthetic discussed including dental damage, aspiration, loss of airway, conversion to general anesthetic, CV complications, MI, stroke, death. Pt wishes to proceed.             AARON Moore CRNA

## 2023-06-21 NOTE — PROGRESS NOTES
Patient is here for consult on her right wrist fracture.  Judy Bond LPN .....................6/21/2023 11:09 AM

## 2023-06-22 ENCOUNTER — LAB (OUTPATIENT)
Dept: LAB | Facility: OTHER | Age: 78
End: 2023-06-22
Attending: FAMILY MEDICINE
Payer: COMMERCIAL

## 2023-06-22 DIAGNOSIS — Z13.220 SCREENING CHOLESTEROL LEVEL: ICD-10-CM

## 2023-06-22 LAB
ALBUMIN SERPL BCG-MCNC: 4.2 G/DL (ref 3.5–5.2)
ALP SERPL-CCNC: 23 U/L (ref 35–104)
ALT SERPL W P-5'-P-CCNC: 22 U/L (ref 0–50)
ANION GAP SERPL CALCULATED.3IONS-SCNC: 10 MMOL/L (ref 7–15)
AST SERPL W P-5'-P-CCNC: 39 U/L (ref 0–45)
BILIRUB SERPL-MCNC: 0.4 MG/DL
BUN SERPL-MCNC: 13.8 MG/DL (ref 8–23)
CALCIUM SERPL-MCNC: 9.5 MG/DL (ref 8.8–10.2)
CHLORIDE SERPL-SCNC: 97 MMOL/L (ref 98–107)
CHOLEST SERPL-MCNC: 236 MG/DL
CREAT SERPL-MCNC: 0.66 MG/DL (ref 0.51–0.95)
DEPRECATED HCO3 PLAS-SCNC: 27 MMOL/L (ref 22–29)
GFR SERPL CREATININE-BSD FRML MDRD: 89 ML/MIN/1.73M2
GLUCOSE SERPL-MCNC: 96 MG/DL (ref 70–99)
HDLC SERPL-MCNC: 102 MG/DL
LDLC SERPL CALC-MCNC: 120 MG/DL
NONHDLC SERPL-MCNC: 134 MG/DL
POTASSIUM SERPL-SCNC: 4.1 MMOL/L (ref 3.4–5.3)
PROT SERPL-MCNC: 6.7 G/DL (ref 6.4–8.3)
R RICKETTSI IGG TITR SER IF: NORMAL {TITER}
R RICKETTSI IGM TITR SER IF: NORMAL {TITER}
SODIUM SERPL-SCNC: 134 MMOL/L (ref 136–145)
TRIGL SERPL-MCNC: 70 MG/DL

## 2023-06-22 PROCEDURE — 80053 COMPREHEN METABOLIC PANEL: CPT | Mod: ZL

## 2023-06-22 PROCEDURE — 36415 COLL VENOUS BLD VENIPUNCTURE: CPT | Mod: ZL

## 2023-06-22 PROCEDURE — 80061 LIPID PANEL: CPT | Mod: ZL

## 2023-06-23 ENCOUNTER — HOSPITAL ENCOUNTER (OUTPATIENT)
Facility: HOSPITAL | Age: 78
Discharge: HOME | End: 2023-06-23
Attending: ORTHOPAEDIC SURGERY | Admitting: ORTHOPAEDIC SURGERY
Payer: COMMERCIAL

## 2023-06-23 ENCOUNTER — ANESTHESIA (OUTPATIENT)
Dept: SURGERY | Facility: HOSPITAL | Age: 78
End: 2023-06-23
Payer: COMMERCIAL

## 2023-06-23 ENCOUNTER — APPOINTMENT (OUTPATIENT)
Dept: ULTRASOUND IMAGING | Facility: HOSPITAL | Age: 78
End: 2023-06-23
Attending: NURSE ANESTHETIST, CERTIFIED REGISTERED
Payer: COMMERCIAL

## 2023-06-23 ENCOUNTER — TELEPHONE (OUTPATIENT)
Dept: FAMILY MEDICINE | Facility: OTHER | Age: 78
End: 2023-06-23
Payer: COMMERCIAL

## 2023-06-23 ENCOUNTER — APPOINTMENT (OUTPATIENT)
Dept: GENERAL RADIOLOGY | Facility: HOSPITAL | Age: 78
End: 2023-06-23
Attending: ORTHOPAEDIC SURGERY
Payer: COMMERCIAL

## 2023-06-23 VITALS
TEMPERATURE: 98.2 F | HEIGHT: 63 IN | BODY MASS INDEX: 24.8 KG/M2 | DIASTOLIC BLOOD PRESSURE: 77 MMHG | WEIGHT: 140 LBS | RESPIRATION RATE: 16 BRPM | HEART RATE: 53 BPM | SYSTOLIC BLOOD PRESSURE: 126 MMHG | OXYGEN SATURATION: 92 %

## 2023-06-23 PROCEDURE — 258N000003 HC RX IP 258 OP 636: Performed by: NURSE ANESTHETIST, CERTIFIED REGISTERED

## 2023-06-23 PROCEDURE — 250N000009 HC RX 250: Performed by: NURSE ANESTHETIST, CERTIFIED REGISTERED

## 2023-06-23 PROCEDURE — 250N000011 HC RX IP 250 OP 636: Performed by: NURSE ANESTHETIST, CERTIFIED REGISTERED

## 2023-06-23 PROCEDURE — 25607 OPTX DST RD XARTC FX/EPI SEP: CPT | Performed by: NURSE ANESTHETIST, CERTIFIED REGISTERED

## 2023-06-23 PROCEDURE — 272N000001 HC OR GENERAL SUPPLY STERILE: Performed by: ORTHOPAEDIC SURGERY

## 2023-06-23 PROCEDURE — 360N000084 HC SURGERY LEVEL 4 W/ FLUORO, PER MIN: Performed by: ORTHOPAEDIC SURGERY

## 2023-06-23 PROCEDURE — 370N000017 HC ANESTHESIA TECHNICAL FEE, PER MIN: Performed by: ORTHOPAEDIC SURGERY

## 2023-06-23 PROCEDURE — 999N000179 XR SURGERY CARM FLUORO LESS THAN 5 MIN W STILLS: Mod: TC

## 2023-06-23 PROCEDURE — C1713 ANCHOR/SCREW BN/BN,TIS/BN: HCPCS | Performed by: ORTHOPAEDIC SURGERY

## 2023-06-23 PROCEDURE — 999N000141 HC STATISTIC PRE-PROCEDURE NURSING ASSESSMENT: Performed by: ORTHOPAEDIC SURGERY

## 2023-06-23 PROCEDURE — 250N000013 HC RX MED GY IP 250 OP 250 PS 637: Performed by: ORTHOPAEDIC SURGERY

## 2023-06-23 PROCEDURE — 99100 ANES PT EXTEME AGE<1 YR&>70: CPT | Performed by: NURSE ANESTHETIST, CERTIFIED REGISTERED

## 2023-06-23 PROCEDURE — 64450 NJX AA&/STRD OTHER PN/BRANCH: CPT | Mod: XU | Performed by: NURSE ANESTHETIST, CERTIFIED REGISTERED

## 2023-06-23 PROCEDURE — 25607 OPTX DST RD XARTC FX/EPI SEP: CPT | Mod: RT | Performed by: ORTHOPAEDIC SURGERY

## 2023-06-23 PROCEDURE — 710N000012 HC RECOVERY PHASE 2, PER MINUTE: Performed by: ORTHOPAEDIC SURGERY

## 2023-06-23 DEVICE — IMPLANTABLE DEVICE: Type: IMPLANTABLE DEVICE | Site: WRIST | Status: FUNCTIONAL

## 2023-06-23 RX ORDER — ONDANSETRON 4 MG/1
4 TABLET, ORALLY DISINTEGRATING ORAL EVERY 30 MIN PRN
Status: DISCONTINUED | OUTPATIENT
Start: 2023-06-23 | End: 2023-06-23 | Stop reason: HOSPADM

## 2023-06-23 RX ORDER — ONDANSETRON 2 MG/ML
INJECTION INTRAMUSCULAR; INTRAVENOUS PRN
Status: DISCONTINUED | OUTPATIENT
Start: 2023-06-23 | End: 2023-06-23

## 2023-06-23 RX ORDER — NALOXONE HYDROCHLORIDE 0.4 MG/ML
0.4 INJECTION, SOLUTION INTRAMUSCULAR; INTRAVENOUS; SUBCUTANEOUS
Status: DISCONTINUED | OUTPATIENT
Start: 2023-06-23 | End: 2023-06-23 | Stop reason: HOSPADM

## 2023-06-23 RX ORDER — SODIUM CHLORIDE, SODIUM LACTATE, POTASSIUM CHLORIDE, CALCIUM CHLORIDE 600; 310; 30; 20 MG/100ML; MG/100ML; MG/100ML; MG/100ML
INJECTION, SOLUTION INTRAVENOUS CONTINUOUS
Status: DISCONTINUED | OUTPATIENT
Start: 2023-06-23 | End: 2023-06-23 | Stop reason: HOSPADM

## 2023-06-23 RX ORDER — FENTANYL CITRATE 50 UG/ML
25 INJECTION, SOLUTION INTRAMUSCULAR; INTRAVENOUS
Status: DISCONTINUED | OUTPATIENT
Start: 2023-06-23 | End: 2023-06-23 | Stop reason: HOSPADM

## 2023-06-23 RX ORDER — HYDROCODONE BITARTRATE AND ACETAMINOPHEN 5; 325 MG/1; MG/1
1 TABLET ORAL
Status: DISCONTINUED | OUTPATIENT
Start: 2023-06-23 | End: 2023-06-23 | Stop reason: HOSPADM

## 2023-06-23 RX ORDER — PROPOFOL 10 MG/ML
INJECTION, EMULSION INTRAVENOUS CONTINUOUS PRN
Status: DISCONTINUED | OUTPATIENT
Start: 2023-06-23 | End: 2023-06-23

## 2023-06-23 RX ORDER — NALOXONE HYDROCHLORIDE 0.4 MG/ML
0.2 INJECTION, SOLUTION INTRAMUSCULAR; INTRAVENOUS; SUBCUTANEOUS
Status: DISCONTINUED | OUTPATIENT
Start: 2023-06-23 | End: 2023-06-23 | Stop reason: HOSPADM

## 2023-06-23 RX ORDER — ONDANSETRON 2 MG/ML
4 INJECTION INTRAMUSCULAR; INTRAVENOUS EVERY 30 MIN PRN
Status: DISCONTINUED | OUTPATIENT
Start: 2023-06-23 | End: 2023-06-23 | Stop reason: HOSPADM

## 2023-06-23 RX ORDER — DEXAMETHASONE SODIUM PHOSPHATE 10 MG/ML
INJECTION, SOLUTION INTRAMUSCULAR; INTRAVENOUS
Status: COMPLETED | OUTPATIENT
Start: 2023-06-23 | End: 2023-06-23

## 2023-06-23 RX ORDER — ONDANSETRON 4 MG/1
4 TABLET, FILM COATED ORAL EVERY 8 HOURS PRN
COMMUNITY
End: 2023-07-07

## 2023-06-23 RX ORDER — BUPIVACAINE HYDROCHLORIDE 5 MG/ML
INJECTION, SOLUTION EPIDURAL; INTRACAUDAL
Status: COMPLETED | OUTPATIENT
Start: 2023-06-23 | End: 2023-06-23

## 2023-06-23 RX ORDER — FENTANYL CITRATE 50 UG/ML
25 INJECTION, SOLUTION INTRAMUSCULAR; INTRAVENOUS EVERY 5 MIN PRN
Status: DISCONTINUED | OUTPATIENT
Start: 2023-06-23 | End: 2023-06-23 | Stop reason: HOSPADM

## 2023-06-23 RX ORDER — LIDOCAINE 40 MG/G
CREAM TOPICAL
Status: DISCONTINUED | OUTPATIENT
Start: 2023-06-23 | End: 2023-06-23 | Stop reason: HOSPADM

## 2023-06-23 RX ORDER — LABETALOL 20 MG/4 ML (5 MG/ML) INTRAVENOUS SYRINGE
10
Status: DISCONTINUED | OUTPATIENT
Start: 2023-06-23 | End: 2023-06-23 | Stop reason: HOSPADM

## 2023-06-23 RX ORDER — HYDROMORPHONE HCL IN WATER/PF 6 MG/30 ML
0.4 PATIENT CONTROLLED ANALGESIA SYRINGE INTRAVENOUS EVERY 5 MIN PRN
Status: DISCONTINUED | OUTPATIENT
Start: 2023-06-23 | End: 2023-06-23 | Stop reason: HOSPADM

## 2023-06-23 RX ORDER — LIDOCAINE HYDROCHLORIDE 20 MG/ML
INJECTION, SOLUTION INFILTRATION; PERINEURAL PRN
Status: DISCONTINUED | OUTPATIENT
Start: 2023-06-23 | End: 2023-06-23

## 2023-06-23 RX ORDER — ACETAMINOPHEN 325 MG/1
975 TABLET ORAL ONCE
Status: COMPLETED | OUTPATIENT
Start: 2023-06-23 | End: 2023-06-23

## 2023-06-23 RX ORDER — FENTANYL CITRATE 50 UG/ML
INJECTION, SOLUTION INTRAMUSCULAR; INTRAVENOUS PRN
Status: DISCONTINUED | OUTPATIENT
Start: 2023-06-23 | End: 2023-06-23

## 2023-06-23 RX ORDER — CEFAZOLIN SODIUM/WATER 2 G/20 ML
2 SYRINGE (ML) INTRAVENOUS
Status: DISCONTINUED | OUTPATIENT
Start: 2023-06-23 | End: 2023-06-23 | Stop reason: HOSPADM

## 2023-06-23 RX ORDER — ALBUTEROL SULFATE 0.83 MG/ML
2.5 SOLUTION RESPIRATORY (INHALATION) EVERY 4 HOURS PRN
Status: DISCONTINUED | OUTPATIENT
Start: 2023-06-23 | End: 2023-06-23 | Stop reason: HOSPADM

## 2023-06-23 RX ORDER — CEFAZOLIN SODIUM/WATER 2 G/20 ML
2 SYRINGE (ML) INTRAVENOUS SEE ADMIN INSTRUCTIONS
Status: DISCONTINUED | OUTPATIENT
Start: 2023-06-23 | End: 2023-06-23 | Stop reason: HOSPADM

## 2023-06-23 RX ORDER — HYDRALAZINE HYDROCHLORIDE 20 MG/ML
2.5-5 INJECTION INTRAMUSCULAR; INTRAVENOUS EVERY 10 MIN PRN
Status: DISCONTINUED | OUTPATIENT
Start: 2023-06-23 | End: 2023-06-23 | Stop reason: HOSPADM

## 2023-06-23 RX ORDER — HYDROMORPHONE HCL IN WATER/PF 6 MG/30 ML
0.2 PATIENT CONTROLLED ANALGESIA SYRINGE INTRAVENOUS EVERY 5 MIN PRN
Status: DISCONTINUED | OUTPATIENT
Start: 2023-06-23 | End: 2023-06-23 | Stop reason: HOSPADM

## 2023-06-23 RX ORDER — FENTANYL CITRATE 50 UG/ML
50 INJECTION, SOLUTION INTRAMUSCULAR; INTRAVENOUS EVERY 5 MIN PRN
Status: DISCONTINUED | OUTPATIENT
Start: 2023-06-23 | End: 2023-06-23 | Stop reason: HOSPADM

## 2023-06-23 RX ADMIN — FENTANYL CITRATE 50 MCG: 50 INJECTION, SOLUTION INTRAMUSCULAR; INTRAVENOUS at 08:23

## 2023-06-23 RX ADMIN — SODIUM CHLORIDE, POTASSIUM CHLORIDE, SODIUM LACTATE AND CALCIUM CHLORIDE: 600; 310; 30; 20 INJECTION, SOLUTION INTRAVENOUS at 08:59

## 2023-06-23 RX ADMIN — LIDOCAINE HYDROCHLORIDE 20 MG: 20 INJECTION, SOLUTION INFILTRATION; PERINEURAL at 09:07

## 2023-06-23 RX ADMIN — DEXAMETHASONE SODIUM PHOSPHATE 10 MG: 10 INJECTION, SOLUTION INTRAMUSCULAR; INTRAVENOUS at 08:23

## 2023-06-23 RX ADMIN — ONDANSETRON 4 MG: 2 INJECTION INTRAMUSCULAR; INTRAVENOUS at 09:47

## 2023-06-23 RX ADMIN — BUPIVACAINE HYDROCHLORIDE 20 ML: 5 INJECTION, SOLUTION EPIDURAL; INTRACAUDAL at 08:23

## 2023-06-23 RX ADMIN — ACETAMINOPHEN 975 MG: 325 TABLET, FILM COATED ORAL at 06:44

## 2023-06-23 RX ADMIN — PROPOFOL 50 MCG/KG/MIN: 10 INJECTION, EMULSION INTRAVENOUS at 09:08

## 2023-06-23 RX ADMIN — SODIUM CHLORIDE, POTASSIUM CHLORIDE, SODIUM LACTATE AND CALCIUM CHLORIDE: 600; 310; 30; 20 INJECTION, SOLUTION INTRAVENOUS at 06:39

## 2023-06-23 ASSESSMENT — COPD QUESTIONNAIRES: CAT_SEVERITY: MILD

## 2023-06-23 ASSESSMENT — ACTIVITIES OF DAILY LIVING (ADL)
ADLS_ACUITY_SCORE: 35

## 2023-06-23 NOTE — INTERVAL H&P NOTE
Brief History of Illness:   Yanci Luther is a 78 year old female who was admitted for right wrist injury    H&P reviewed and patient examined with no new updates from prior exam

## 2023-06-23 NOTE — ANESTHESIA PROCEDURE NOTES
Other (Axillary) Procedure Note    Pre-Procedure   Staff -        CRNA: Nereida Osorio APRN CRNA       Performed By: CRNA       Location: pre-op       Procedure Start/Stop Times: 6/23/2023 8:23 AM and 6/23/2023 8:28 AM       Pre-Anesthestic Checklist: patient identified, IV checked, site marked, risks and benefits discussed, informed consent, monitors and equipment checked, pre-op evaluation, at physician/surgeon's request and post-op pain management  Timeout:       Correct Patient: Yes        Correct Procedure: Yes        Correct Site: Yes        Correct Position: Yes        Correct Laterality: Yes        Site Marked: Yes  Procedure Documentation  Procedure: Other (Axillary)       Laterality: right       Patient Position: supine       Skin prep: Chloraprep       Needle Type: insulated and short bevel       Needle Gauge: 20.        Needle Length (Inches): 4        Ultrasound guided       1. Ultrasound was used to identify targeted nerve, plexus, vascular marker, or fascial plane and place a needle adjacent to it in real-time.       2. Ultrasound was used to visualize the spread of anesthetic in close proximity to the above referenced structure.       3. A permanent image is entered into the patient's record.       4. The visualized anatomic structures appeared normal.       5. There were no apparent abnormal pathologic findings.    Assessment/Narrative         The placement was negative for: blood aspirated, painful injection and site bleeding       Paresthesias: No.       Bolus given via needle..        Secured via.        Insertion/Infusion Method: Single Shot       Complications: none       Injection made incrementally with aspirations every 5 mL.    Medication(s) Administered   Bupivacaine 0.5% PF (Infiltration) - Infiltration   20 mL - 6/23/2023 8:23:00 AM  Dexamethasone 10 mg/mL PF (Perineural) - Perineural   10 mg - 6/23/2023 8:23:00 AM  Medication Administration Time: 6/23/2023 8:23 AM      FOR [facility]  "([facility]) ONLY:   Pain Team Contact information: please page the Pain Team Via [employer]. Search \"Pain\". During daytime hours, please page the attending first. At night please page the resident first.      "

## 2023-06-23 NOTE — BRIEF OP NOTE
[facility]    Brief Operative Note    Pre-operative diagnosis: Closed fracture of right distal forearm, initial encounter [S52.91XA]  Post-operative diagnosis Same as pre-operative diagnosis    Procedure: Procedure(s):  OPEN REDUCTION INTERNAL FIXATION, FRACTURE, WRIST  Surgeon: Surgeon(s) and Role:     * Manjit Cote MD - Primary     * Kostas Cutler PA-C - Assisting  Anesthesia: MAC with Block   Estimated Blood Loss: Less than 10 ml    Drains: None  Specimens: * No specimens in log *  Findings:   None.  Complications: None.  Implants: * No implants in log *

## 2023-06-23 NOTE — DISCHARGE INSTRUCTIONS
"    Post-Anesthesia Patient Instructions    IMMEDIATELY FOLLOWING SURGERY:  Do not drive or operate machinery for the first twenty four hours after surgery.  Do not make any important decisions for twenty four hours after surgery or while taking narcotic pain medications or sedatives.  If you develop intractable nausea and vomiting or a severe headache please notify your doctor immediately.    FOLLOW-UP:  Please make an appointment with your surgeon as instructed. You do not need to follow up with anesthesia unless specifically instructed to do so.    WOUND CARE INSTRUCTIONS (if applicable):  Keep a dry clean dressing on the anesthesia/puncture wound site if there is drainage.  Once the wound has quit draining you may leave it open to air.  Generally you should leave the bandage intact for twenty four hours unless there is drainage.  If the epidural site drains for more than 36-48 hours please call the anesthesia department.    QUESTIONS?:  Please feel free to call your physician or the hospital  if you have any questions, and they will be happy to assist you.       If you have any questions or concerns, please call the [facility] Triage Line at [phone].       IMPORTANT OBSERVATIONS  Check C-M-S of operative extremity every 4 hours while you are awake for the first 48 hours:    * C: color - should appear normal/pink   * M: motion - able to move fingers and toes.   * S: sensation - able to \"feel\": no numbness, tingling  If you experience changes in C-M-S and/or in severe pain, you may remove the elastic bandages and reapply ot - tight enough to provide support for the gauze dressing but loose enough to improve C-M-S. The gauze dressing should NOT be removed when rewrapping the elastic bandage.       An appointment has been made for a follow up appointment with Dr. Juarez at [facility] on July 7th at 10:45 AM.    "

## 2023-06-23 NOTE — OP NOTE
Procedure Date: 06/23/2023    PREOPERATIVE DIAGNOSIS:  Right distal radius fracture with displacement intraarticular split x1.    POSTOPERATIVE DIAGNOSIS:  Right distal radius fracture with displacement intraarticular split x1.    PROCEDURES:    1.  ORIF right distal radius using a Magui volar plate.  2.  Short-arm splint.    ASSISTANT:  Kostas Cutler PA-C    ANESTHESIA:  Block with sedation.    COMPLICATIONS:  Without.    INDICATIONS:  The patient is a 78-year-old with history of right distal radius fracture, intraarticular split with displacement.  Recommendation for operative stabilization.  The patient did elect to proceed following a discussion of the risks and benefits.    OPERATIVE PROCEDURE:  The patient was taken to the operative suite, administered anesthesia.  Following proper block as well as sedation, right upper extremity prepped and draped in normal sterile fashion.  Preoperative antibiotics administered and timeout was called.  At this point in time, we made a standard incision for an FCR approach.  We dissected down through the soft tissue, identified the FCR tendon, transposed ulnarly, did release the fascia overlying the FPL and at this time, we did identify the pronator quadratus that was released in a hockey stick fashion with an exposure of the fracture site at this point in time.  Once the fracture site was properly exposed, reduction was carried out.  Anatomic reduction was achieved.  We then placed a plate along the volar aspect.  Once we had positions properly, we fixated this in one of the proximal rows and then we confirmed proper alignment distally.  Once that was confirmed, we then secured this down with multiple locking screws distal to the fracture site and an additional 3 locking screws to the proximal fracture site, took confirmatory pictures showing acceptable alignment and reduction in terms of the plate fixation.  At this point in time, wound surfaces were copiously irrigated.   Incisional site closed with 2-0 Vicryl followed by nylon, followed by application of a well-padded short-arm splint.  The patient was then transferred to recovery in stable condition.    POSTOPERATIVE PLAN:  Sutures out at 14 days, see her back at [facility], transition her to a removable brace at that point in time and then nonweightbearing status and protected activities until fracture is healed at this point in time.  She may potentially see a partner at her first 2-week appointment and I will see her back at 6 weeks.    A skilled first assistant was necessary for position, intraoperative decision making, retraction and exposure during the procedure.    Furthermore, a skilled first assistant was necessary to complete the procedure in a technically safe and efficient manner.    Manjit Cote MD        D: 2023   T: 2023   MT: jad    Name:     SHANIQUE BANKS  MRN:      [id number]        Account:        [id number]   :      1945           Procedure Date: 2023     Document: [id number]

## 2023-06-23 NOTE — ANESTHESIA POSTPROCEDURE EVALUATION
Patient: Yanci Luther    Procedure: Procedure(s):  OPEN REDUCTION INTERNAL FIXATION, FRACTURE, WRIST       Anesthesia Type:  MAC    Note:  Disposition: Outpatient   Postop Pain Control: Uneventful            Sign Out: Well controlled pain   PONV: No   Neuro/Psych: Uneventful            Sign Out: Acceptable/Baseline neuro status   Airway/Respiratory: Uneventful            Sign Out: Acceptable/Baseline resp. status   CV/Hemodynamics: Uneventful            Sign Out: Acceptable CV status; No obvious hypovolemia; No obvious fluid overload   Other NRE: NONE   DID A NON-ROUTINE EVENT OCCUR? No           Last vitals:  Vitals Value Taken Time   /77 06/23/23 1110   Temp 98.2  F (36.8  C) 06/23/23 1110   Pulse 53 06/23/23 1110   Resp 16 06/23/23 1110   SpO2 92 % 06/23/23 1111   Vitals shown include unvalidated device data.    Electronically Signed By: AARON Parker CRNA  June 23, 2023  12:22 PM

## 2023-06-23 NOTE — TELEPHONE ENCOUNTER
Patient was seen in  last week.  Patient's spouse called to report that her medication is making her nausea.

## 2023-06-23 NOTE — TELEPHONE ENCOUNTER
Patient was called and a message was left.  I did discuss patient's case with collaborating physician who recommends that we discontinue the antibiotic as all tickborne testing was negative and she is afebrile at this point.  I recommend following up as scheduled in 3 days with her primary care provider.  AARON MENDOZA CNP on 6/23/2023 at 3:26 PM

## 2023-06-23 NOTE — OR NURSING
Patient and responsible adult given discharge instructions with no questions regarding instructions. Lynn score 19. Pain level 0/10.  Discharged from unit via wheelchair. Patient discharged to home with . Tolerated water. Declined solids    
done

## 2023-06-23 NOTE — ANESTHESIA CARE TRANSFER NOTE
Patient: Yanci Luther    Procedure: Procedure(s):  OPEN REDUCTION INTERNAL FIXATION, FRACTURE, WRIST       Diagnosis: Closed fracture of right distal forearm, initial encounter [S52.91XA]  Diagnosis Additional Information: No value filed.    Anesthesia Type:   MAC     Note:    Oropharynx: oropharynx clear of all foreign objects  Level of Consciousness: drowsy  Oxygen Supplementation: room air    Independent Airway: airway patency satisfactory and stable  Dentition: dentition unchanged  Vital Signs Stable: post-procedure vital signs reviewed and stable  Report to RN Given: handoff report given  Patient transferred to: Phase II    Handoff Report: Identifed the Patient, Identified the Reponsible Provider, Reviewed the pertinent medical history, Discussed the surgical course, Reviewed Intra-OP anesthesia mangement and issues during anesthesia, Set expectations for post-procedure period and Allowed opportunity for questions and acknowledgement of understanding      Vitals:  Vitals Value Taken Time   BP     Temp     Pulse     Resp     SpO2         Electronically Signed By: AARON Nieves CRNA  June 23, 2023  10:09 AM

## 2023-06-23 NOTE — INTERVAL H&P NOTE
Brief History of Illness:   Yanci Luther is a 78 year old female who was admitted for right wrist pain    H&P reviewed and patient examined with no new updates from prior exam

## 2023-06-25 ASSESSMENT — ENCOUNTER SYMPTOMS
FREQUENCY: 0
ARTHRALGIAS: 1
PALPITATIONS: 0
DIZZINESS: 1
NERVOUS/ANXIOUS: 0
ABDOMINAL PAIN: 0
NAUSEA: 0
PARESTHESIAS: 0
DIARRHEA: 0
DYSURIA: 0
WEAKNESS: 1
HEMATURIA: 0
JOINT SWELLING: 0
COUGH: 0
BREAST MASS: 0
MYALGIAS: 1
HEARTBURN: 0
SHORTNESS OF BREATH: 0
EYE PAIN: 0
HEADACHES: 0
CHILLS: 0
HEMATOCHEZIA: 0
SORE THROAT: 0
CONSTIPATION: 1
FEVER: 0

## 2023-06-25 ASSESSMENT — ACTIVITIES OF DAILY LIVING (ADL): CURRENT_FUNCTION: NO ASSISTANCE NEEDED

## 2023-06-26 ENCOUNTER — OFFICE VISIT (OUTPATIENT)
Dept: FAMILY MEDICINE | Facility: OTHER | Age: 78
End: 2023-06-26
Attending: FAMILY MEDICINE
Payer: COMMERCIAL

## 2023-06-26 VITALS
HEIGHT: 63 IN | WEIGHT: 136.6 LBS | RESPIRATION RATE: 14 BRPM | TEMPERATURE: 95.7 F | BODY MASS INDEX: 24.2 KG/M2 | OXYGEN SATURATION: 98 % | DIASTOLIC BLOOD PRESSURE: 76 MMHG | HEART RATE: 65 BPM | SYSTOLIC BLOOD PRESSURE: 126 MMHG

## 2023-06-26 DIAGNOSIS — R55 SYNCOPE, UNSPECIFIED SYNCOPE TYPE: ICD-10-CM

## 2023-06-26 DIAGNOSIS — Z00.00 ENCOUNTER FOR MEDICARE ANNUAL WELLNESS EXAM: Primary | ICD-10-CM

## 2023-06-26 DIAGNOSIS — H90.8 MIXED CONDUCTIVE AND SENSORINEURAL HEARING LOSS, UNSPECIFIED LATERALITY: ICD-10-CM

## 2023-06-26 DIAGNOSIS — M81.0 SENILE OSTEOPOROSIS: ICD-10-CM

## 2023-06-26 DIAGNOSIS — N89.8 VAGINAL DISCHARGE: ICD-10-CM

## 2023-06-26 LAB
CLUE CELLS: NORMAL
TRICHOMONAS, WET PREP: NORMAL
WBC'S/HIGH POWER FIELD, WET PREP: NORMAL
YEAST, WET PREP: NORMAL

## 2023-06-26 PROCEDURE — 93005 ELECTROCARDIOGRAM TRACING: CPT | Performed by: FAMILY MEDICINE

## 2023-06-26 PROCEDURE — 99213 OFFICE O/P EST LOW 20 MIN: CPT | Mod: 25 | Performed by: FAMILY MEDICINE

## 2023-06-26 PROCEDURE — G0439 PPPS, SUBSEQ VISIT: HCPCS | Performed by: FAMILY MEDICINE

## 2023-06-26 PROCEDURE — 87210 SMEAR WET MOUNT SALINE/INK: CPT | Mod: ZL | Performed by: FAMILY MEDICINE

## 2023-06-26 PROCEDURE — 93010 ELECTROCARDIOGRAM REPORT: CPT | Performed by: INTERNAL MEDICINE

## 2023-06-26 PROCEDURE — G0463 HOSPITAL OUTPT CLINIC VISIT: HCPCS

## 2023-06-26 ASSESSMENT — ENCOUNTER SYMPTOMS
CONSTIPATION: 1
NAUSEA: 0
HEADACHES: 0
SHORTNESS OF BREATH: 0
PALPITATIONS: 0
FREQUENCY: 0
MYALGIAS: 1
JOINT SWELLING: 0
BREAST MASS: 0
HEMATURIA: 0
FEVER: 0
DYSURIA: 0
HEARTBURN: 0
PARESTHESIAS: 0
DIZZINESS: 1
COUGH: 0
WEAKNESS: 1
DIARRHEA: 0
SORE THROAT: 0
ABDOMINAL PAIN: 0
NERVOUS/ANXIOUS: 0
EYE PAIN: 0
CHILLS: 0
HEMATOCHEZIA: 0
ARTHRALGIAS: 1

## 2023-06-26 ASSESSMENT — PAIN SCALES - GENERAL: PAINLEVEL: NO PAIN (1)

## 2023-06-26 ASSESSMENT — ACTIVITIES OF DAILY LIVING (ADL): CURRENT_FUNCTION: NO ASSISTANCE NEEDED

## 2023-06-26 NOTE — PATIENT INSTRUCTIONS
Patient Education   Personalized Prevention Plan  You are due for the preventive services outlined below.  Your care team is available to assist you in scheduling these services.  If you have already completed any of these items, please share that information with your care team to update in your medical record.  Health Maintenance Due   Topic Date Due     Breathing Capacity Test  Never done     COPD Action Plan  Never done     COVID-19 Vaccine (1) Never done     Diptheria Tetanus Pertussis (DTAP/TDAP/TD) Vaccine (1 - Tdap) Never done     LUNG CANCER SCREENING  Never done     Zoster (Shingles) Vaccine (2 of 2) 11/13/2020       Signs of Hearing Loss  Hearing loss is a problem shared by many people. In fact, it's one of the most common health problems, particularly as people age. Most people aged 65 and older have some hearing loss. By age 80, almost everyone does. Hearing loss often occurs slowly over the years. So, you may not realize your hearing has gotten worse.   When sudden hearing loss occurs, it's important to contact your healthcare provider right away. Your provider will do a medical exam and a hearing exam as soon as possible. This is to help find the cause and type of your sudden hearing loss. Based on your diagnosis, your healthcare provider will discuss possible treatments.      Hearing much better with one ear can be a sign of hearing loss.     Have your hearing checked  Call your healthcare provider if you:     Have to strain to hear normal conversation    Have to watch other people s faces very carefully to follow what they re saying    Need to ask people to repeat what they ve said    Often misunderstand what people are saying    Turn the volume of the television or radio up so high that others complain    Feel that people are mumbling when they re talking to you    Find that the effort to hear leaves you feeling tired and irritated    Notice, when using the phone, that you hear better with one  ear than the other  [employer] last reviewed this educational content on 6/1/2022 2000-2022 [employer]. All rights reserved. This information is not intended as a substitute for professional medical care. Always follow your healthcare professional's instructions.          Urinary Incontinence, Female (Adult)   Urinary incontinence means loss of bladder control. This problem affects many women, especially as they get older. If you have incontinence, you may be embarrassed to ask for help. But know that this problem can be treated.   Types of Incontinence  There are different types of incontinence. Two of the main types are described here. You can have more than one type.     Stress incontinence. With this type, urine leaks when pressure (stress) is put on the bladder. This may happen when you cough, sneeze, or laugh. Stress incontinence most often occurs because the pelvic floor muscles that support the bladder and urethra are weak. This can happen after pregnancy and vaginal childbirth or a hysterectomy. It can also be due to excess body weight or hormone changes.    Urge incontinence (also called overactive bladder). With this type, a sudden urge to urinate is felt often. This may happen even though there may not be much urine in the bladder. The need to urinate often during the night is common. Urge incontinence most often occurs because of bladder spasms. This may be due to bladder irritation or infection. Damage to bladder nerves or pelvic muscles, constipation, and certain medicines can also lead to urge incontinence.  Treatment depends on the cause. Further evaluation is needed to find the type you have. This will likely include an exam and certain tests. Based on the results, you and your healthcare provider can then plan treatment. Until a diagnosis is made, the home care tips below can help ease symptoms.   Home care    Do pelvic floor muscle exercises, if they are prescribed. The pelvic  floor muscles help support the bladder and urethra. Many women find that their symptoms improve when doing special exercises that strengthen these muscles. To do the exercises, contract the muscles you would use to stop your stream of urine. But do this when you re not urinating. Hold for 10 seconds, then relax. Repeat 10 to 20 times in a row, at least 3 times a day. Your healthcare provider may give you other instructions for how to do the exercises and how often.    Keep a bladder diary. This helps track how often and how much you urinate over a set period of time. Bring this diary with you to your next visit with the provider. The information can help your provider learn more about your bladder problem.    Lose weight, if advised to by your provider. Extra weight puts pressure on the bladder. Your provider can help you create a weight-loss plan that s right for you. This may include exercising more and making certain diet changes.    Don't have foods and drinks that may irritate the bladder. These can include alcohol and caffeinated drinks.    Quit smoking. Smoking and other tobacco use can lead to a long-term (chronic) cough that strains the pelvic floor muscles. Smoking may also damage the bladder and urethra. Talk with your provider about treatments or methods you can use to quit smoking.    If drinking large amounts of fluid makes you have symptoms, you may be advised to limit your fluid intake. You may also be advised to drink most of your fluids during the day and to limit fluids at night.    If you re worried about urine leakage or accidents, you may wear absorbent pads to catch urine. Change the pads often. This helps reduce discomfort. It may also reduce the risk of skin or bladder infections.    Follow-up care  Follow up with your healthcare provider, or as directed. It may take some to find the right treatment for your problem. But healthy lifestyle changes can be made right away. These include such  things as exercising on a regular basis, eating a healthy diet, losing weight (if needed), and quitting smoking. Your treatment plan may include special therapies or medicines. Certain procedures or surgery may also be options. Talk about any questions you have with your provider.   When to seek medical advice  Call the healthcare provider right away if any of these occur:    Fever of 100.4 F (38 C) or higher, or as directed by your provider    Bladder pain or fullness    Belly swelling    Nausea or vomiting    Back pain    Weakness, dizziness, or fainting  [employer] last reviewed this educational content on 1/1/2020 2000-2022 [employer]. All rights reserved. This information is not intended as a substitute for professional medical care. Always follow your healthcare professional's instructions.          Preventing Falls at Home  A person can fall for many reasons. Older adults may fall because reaction time slows down as we age. Your muscles and joints may get stiff, weak, or less flexible because of illness, medicines, or a physical condition.   Other health problems that make falls more likely include:     Arthritis    Dizziness or lightheadedness when you stand up (orthostatic hypotension)    History of a stroke    Dizziness    Anemia    Certain medicines taken for mental illness or to control blood pressure.    Problems with balance or gait    Bladder or urinary problems    History of falling    Changes in vision (vision impairment)    Changes in thinking skills and memory (cognitive impairment)  Injuries from a fall can include serious injuries such as broken bones, dislocated joints, internal bleeding and cuts. Injuries like these can limit your independence.   Prevention tips  To help prevent falls and fall-related injuries, follow the tips below.    Floors  To make floors safer:     Put nonskid pads under area rugs.    Remove small rugs.    Replace worn floor coverings.    Tack carpets  firmly to each step on carpeted stairs. Put nonskid strips on the edges of uncarpeted stairs.    Keep floors and stairs free of clutter and cords.    Arrange furniture so there are clear pathways.    Clean up any spills right away.  Bathrooms    To make bathrooms safer:     Install grab bars in the tub or shower.    Apply nonskid strips or put a nonskid rubber mat in the tub or shower.    Sit on a bath chair to bathe.    Use bathmats with nonskid backing.  Lighting  To improve visibility in your home:      Keep a flashlight in each room. Or put a lamp next to the bed within easy reach.    Put nightlights in the bedrooms, hallways, kitchen, and bathrooms.    Make sure all stairways have good lighting.    Take your time when going up and down stairs.    Put handrails on both sides of stairs and in walkways for more support. To prevent injury to your wrist or arm, don t use handrails to pull yourself up.    Install grab bars to pull yourself up.    Move or rearrange items that you use often. This will make them easier to find or reach.    Look at your home to find any safety hazards. Especially look at doorways, walkways, and the driveway. Remove or repair any safety problems that you find.  Other changes to make    Look around to find any safety hazards. Look closely at doorways, walkways, and the driveway. Remove or repair any safety problems that you find.    Wear shoes that fit well.    Take your time when going up and down stairs.    Put handrails on both sides of stairs and in walkways for more support. To prevent injury to your wrist or arm, don t use handrails to pull yourself up.    Install grab bars wherever needed to pull yourself up.    Arrange items that you use often. This will make them easier to find or reach.    [employer] last reviewed this educational content on 3/1/2020    7827-2789 [employer]. All rights reserved. This information is not intended as a substitute for professional  medical care. Always follow your healthcare professional's instructions.

## 2023-06-26 NOTE — NURSING NOTE
"Chief Complaint   Patient presents with     Physical     Medicare Wellness          Initial /76 (BP Location: Right arm, Patient Position: Sitting, Cuff Size: Adult Regular)   Pulse 65   Temp (!) 95.7  F (35.4  C) (Tympanic)   Resp 14   Ht 1.6 m (5' 3\")   Wt 62 kg (136 lb 9.6 oz)   LMP 08/01/1994 (Approximate)   SpO2 98%   BMI 24.20 kg/m   Estimated body mass index is 24.2 kg/m  as calculated from the following:    Height as of this encounter: 1.6 m (5' 3\").    Weight as of this encounter: 62 kg (136 lb 9.6 oz).       Medication Reconciliation: Complete    Nancie Bojorquez LPN .......  6/26/2023  9:24 AM   "

## 2023-06-26 NOTE — PROGRESS NOTES
"SUBJECTIVE:   Yanci is a 78 year old who presents for Preventive Visit.    She comes in today for annual exam.  She has a history of osteoporosis.  Unfortunately she fell last week and suffered a distal radius fracture.  She had an ORIF completed on June 23.  She tolerated this well.  She has not used any narcotic medications as her pain has been controlled with ibuprofen.    She has a history of weakness, dizziness, decreased hearing.  She is initially treated for tick disease with doxycycline however her tick labs came back negative.  She did not tolerate doxycycline either.  She has had the symptoms for well over a year.  She has had a fairly thorough work-up including a coronary artery angiogram which was unremarkable, echocardiogram which showed an ejection fraction of 55 to 60% and no other valvular abnormalities.  History of EKG showing right bundle branch block in the past.  They have tried to decrease caffeine.  They do not think she has had increased stress.  She has had no vision change.  Last year labs were drawn for inflammatory arthritis which all came back negative.        6/26/2023     9:21 AM   Additional Questions   Roomed by Nancie Bojorquez LPN   Accompanied by Sejal Gavin         6/26/2023     9:21 AM   Patient Reported Additional Medications   Patient reports taking the following new medications None, Has pain medication from surgery but is not taking it     Are you in the first 12 months of your Medicare coverage?  No    Healthy Habits:     In general, how would you rate your overall health?  Good    Frequency of exercise:  4-5 days/week    Duration of exercise:  15-30 minutes    Do you usually eat at least 4 servings of fruit and vegetables a day, include whole grains    & fiber and avoid regularly eating high fat or \"junk\" foods?  Yes    Taking medications regularly:  No    Barriers to taking medications:  Other    Medication side effects:  Not applicable and None    Ability to " "successfully perform activities of daily living:  No assistance needed    Home Safety:  Throw rugs in the hallway, lack of grab bars in the bathroom and lack of handrails on stairs    Hearing Impairment:  Feel that people are mumbling or not speaking clearly, difficulty following dialogue in the theater, need to ask people to speak up or repeat themselves and difficulty understanding soft or whispered speech    In the past 6 months, have you been bothered by leaking of urine? Yes    In general, how would you rate your overall mental or emotional health?  Good      PHQ-2 Total Score: 0    Additional concerns today:  Yes        Have you ever done Advance Care Planning? (For example, a Health Directive, POLST, or a discussion with a medical provider or your loved ones about your wishes): Yes, advance care planning is on file.       Fall risk  Fallen 2 or more times in the past year?: No  Any fall with injury in the past year?: Yes    Cognitive Screening   1) Repeat 3 items (Leader, Season, Table)    2) Clock draw: NORMAL  3) 3 item recall: Recalls 3 objects  Results: 3 items recalled: COGNITIVE IMPAIRMENT LESS LIKELY    Mini-CogTM Copyright S Minnie. Licensed by the author for use in [facility]; reprinted with permission (soob@[facility]). All rights reserved.      Do you have sleep apnea, excessive snoring or daytime drowsiness?: no    Reviewed and updated as needed this visit by clinical staff   Tobacco  Allergies  Meds      Soc Hx        Reviewed and updated as needed this visit by Provider                 Social History     Tobacco Use     Smoking status: Former     Smokeless tobacco: Never     Tobacco comments:     \"quit about 30 years ago\"   Substance Use Topics     Alcohol use: Yes     Comment: 3 wine or beer daily             6/25/2023    10:44 AM   Alcohol Use   Prescreen: >3 drinks/day or >7 drinks/week? Yes     Do you have a current opioid prescription? Yes   How severe is your pain on a scale " from 1-10? 1/10            No data to display              Low Risk (0-3)  Moderate Risk (4-7)  High Risk (>8)  Do you use any other controlled substances or medications that are not prescribed by a provider? None              Current providers sharing in care for this patient include:   Patient Care Team:  Janes Gunter MD as PCP - General (Family Practice)  Janes Gunter MD as Assigned PCP  Janes Gunter MD as Assigned Pain Medication Provider  Manjit Cote MD as Assigned Musculoskeletal Provider    The following health maintenance items are reviewed in Epic and correct as of today:  Health Maintenance   Topic Date Due     SPIROMETRY  Never done     COPD ACTION PLAN  Never done     COVID-19 Vaccine (1) Never done     DTAP/TDAP/TD IMMUNIZATION (1 - Tdap) Never done     LUNG CANCER SCREENING  Never done     ZOSTER IMMUNIZATION (2 of 2) 11/13/2020     MEDICARE ANNUAL WELLNESS VISIT  06/21/2023     FALL RISK ASSESSMENT  06/26/2024     ADVANCE CARE PLANNING  06/21/2027     LIPID  06/22/2028     DEXA  10/06/2037     PHQ-2 (once per calendar year)  Completed     INFLUENZA VACCINE  Completed     Pneumococcal Vaccine: 65+ Years  Completed     IPV IMMUNIZATION  Aged Out     MENINGITIS IMMUNIZATION  Aged Out     HEPATITIS C SCREENING  Discontinued     MAMMO SCREENING  Discontinued     COLORECTAL CANCER SCREENING  Discontinued     Labs reviewed in EPIC      Pertinent mammograms are reviewed under the imaging tab.    Review of Systems   Constitutional: Negative for chills and fever.   HENT: Positive for hearing loss. Negative for congestion, ear pain and sore throat.    Eyes: Negative for pain and visual disturbance.   Respiratory: Negative for cough and shortness of breath.    Cardiovascular: Negative for chest pain, palpitations and peripheral edema.   Gastrointestinal: Positive for constipation. Negative for abdominal pain, diarrhea, heartburn, hematochezia and nausea.   Breasts:  Negative for tenderness, breast  "mass and discharge.   Genitourinary: Positive for pelvic pain. Negative for dysuria, frequency, genital sores, hematuria, urgency, vaginal bleeding and vaginal discharge.   Musculoskeletal: Positive for arthralgias and myalgias. Negative for joint swelling.   Skin: Negative for rash.   Neurological: Positive for dizziness and weakness. Negative for headaches and paresthesias.   Psychiatric/Behavioral: Negative for mood changes. The patient is not nervous/anxious.          OBJECTIVE:   /76 (BP Location: Right arm, Patient Position: Sitting, Cuff Size: Adult Regular)   Pulse 65   Temp (!) 95.7  F (35.4  C) (Tympanic)   Resp 14   Ht 1.6 m (5' 3\")   Wt 62 kg (136 lb 9.6 oz)   LMP 08/01/1994 (Approximate)   SpO2 98%   BMI 24.20 kg/m   Estimated body mass index is 24.2 kg/m  as calculated from the following:    Height as of this encounter: 1.6 m (5' 3\").    Weight as of this encounter: 62 kg (136 lb 9.6 oz).  Physical Exam  GENERAL APPEARANCE: healthy, alert and no distress  EYES: Eyes grossly normal to inspection, PERRL and conjunctivae and sclerae normal  HENT: ear canals and TM's normal, nose and mouth without ulcers or lesions, oropharynx clear and oral mucous membranes moist  NECK: no adenopathy, no asymmetry, masses, or scars and thyroid normal to palpation  RESP: lungs clear to auscultation - no rales, rhonchi or wheezes  CV: regular rate and rhythm, normal S1 S2, no S3 or S4, no murmur, click or rub, no peripheral edema and peripheral pulses strong  ABDOMEN: soft, nontender, no hepatosplenomegaly, no masses and bowel sounds normal   (female): Normal discharge.  Wet prep was obtained.  MS: no musculoskeletal defects are noted and gait is age appropriate without ataxia  SKIN: no suspicious lesions or rashes  NEURO: Normal strength and tone, sensory exam grossly normal, mentation intact and speech normal  PSYCH: mentation appears normal and affect normal/bright    Diagnostic Test Results:  Labs " reviewed in Epic    ASSESSMENT / PLAN:       ICD-10-CM    1. Encounter for Medicare annual wellness exam  Z00.00       2. Senile osteoporosis  M81.0       3. Syncope, unspecified syncope type  R55 EKG 12-lead, tracing only      4. Vaginal discharge  N89.8 Multiplex Vaginal Panel by PCR        In regards to her syncope, weakness and dizziness there have been no significant findings as of yet.  EKG was repeated today, personally viewed and shows sinus bradycardia without any abnormalities..    Consider proceeding with MRI of her head and consider neurologic referral.    For vaginal discharge wet prep was obtained.    Sitter flu and COVID-vaccine in the fall.    Will refer to audiology for formal hearing evaluation.          COUNSELING:  Reviewed preventive health counseling, as reflected in patient instructions       Regular exercise       Healthy diet/nutrition       Vision screening        She reports that she has quit smoking. She has never used smokeless tobacco.      Appropriate preventive services were discussed with this patient, including applicable screening as appropriate for cardiovascular disease, diabetes, osteopenia/osteoporosis, and glaucoma.  As appropriate for age/gender, discussed screening for colorectal cancer, prostate cancer, breast cancer, and cervical cancer. Checklist reviewing preventive services available has been given to the patient.    Reviewed patients plan of care and provided an AVS. The Basic Care Plan (routine screening as documented in Health Maintenance) for Yanci meets the Care Plan requirement. This Care Plan has been established and reviewed with the Patient.          Janes Gunter MD  [facility]    Identified Health Risks:    I have reviewed Opioid Use Disorder and Substance Use Disorder risk factors and made any needed referrals.

## 2023-06-29 LAB
ATRIAL RATE - MUSE: 56 BPM
DIASTOLIC BLOOD PRESSURE - MUSE: NORMAL MMHG
INTERPRETATION ECG - MUSE: NORMAL
P AXIS - MUSE: 91 DEGREES
PR INTERVAL - MUSE: 148 MS
QRS DURATION - MUSE: 96 MS
QT - MUSE: 418 MS
QTC - MUSE: 403 MS
R AXIS - MUSE: -26 DEGREES
SYSTOLIC BLOOD PRESSURE - MUSE: NORMAL MMHG
T AXIS - MUSE: 36 DEGREES
VENTRICULAR RATE- MUSE: 56 BPM

## 2023-07-01 ENCOUNTER — HOSPITAL ENCOUNTER (OUTPATIENT)
Dept: MRI IMAGING | Facility: OTHER | Age: 78
Discharge: HOME | End: 2023-07-01
Attending: FAMILY MEDICINE | Admitting: FAMILY MEDICINE
Payer: COMMERCIAL

## 2023-07-01 DIAGNOSIS — R55 SYNCOPE, UNSPECIFIED SYNCOPE TYPE: ICD-10-CM

## 2023-07-01 PROCEDURE — 255N000002 HC RX 255 OP 636: Mod: JZ | Performed by: FAMILY MEDICINE

## 2023-07-01 PROCEDURE — 70553 MRI BRAIN STEM W/O & W/DYE: CPT

## 2023-07-01 PROCEDURE — A9575 INJ GADOTERATE MEGLUMI 0.1ML: HCPCS | Mod: JZ | Performed by: FAMILY MEDICINE

## 2023-07-01 RX ORDER — GADOTERATE MEGLUMINE 376.9 MG/ML
15 INJECTION INTRAVENOUS ONCE
Status: COMPLETED | OUTPATIENT
Start: 2023-07-01 | End: 2023-07-01

## 2023-07-01 RX ADMIN — GADOTERATE MEGLUMINE 13 ML: 376.9 INJECTION INTRAVENOUS at 11:17

## 2023-07-03 ENCOUNTER — MYC MEDICAL ADVICE (OUTPATIENT)
Dept: FAMILY MEDICINE | Facility: OTHER | Age: 78
End: 2023-07-03
Payer: COMMERCIAL

## 2023-07-03 DIAGNOSIS — I67.9 CEREBROVASCULAR SMALL VESSEL DISEASE: Primary | ICD-10-CM

## 2023-07-05 DIAGNOSIS — Z87.81 S/P ORIF (OPEN REDUCTION INTERNAL FIXATION) FRACTURE: Primary | ICD-10-CM

## 2023-07-05 DIAGNOSIS — Z98.890 S/P ORIF (OPEN REDUCTION INTERNAL FIXATION) FRACTURE: Primary | ICD-10-CM

## 2023-07-07 ENCOUNTER — OFFICE VISIT (OUTPATIENT)
Dept: NEUROLOGY | Facility: OTHER | Age: 78
End: 2023-07-07
Attending: PSYCHIATRY & NEUROLOGY
Payer: COMMERCIAL

## 2023-07-07 ENCOUNTER — HOSPITAL ENCOUNTER (OUTPATIENT)
Dept: GENERAL RADIOLOGY | Facility: OTHER | Age: 78
Discharge: HOME | End: 2023-07-07
Attending: SPECIALIST
Payer: COMMERCIAL

## 2023-07-07 ENCOUNTER — OFFICE VISIT (OUTPATIENT)
Dept: ORTHOPEDICS | Facility: OTHER | Age: 78
End: 2023-07-07
Attending: SPECIALIST
Payer: COMMERCIAL

## 2023-07-07 VITALS
TEMPERATURE: 97.7 F | WEIGHT: 139.6 LBS | DIASTOLIC BLOOD PRESSURE: 88 MMHG | SYSTOLIC BLOOD PRESSURE: 148 MMHG | OXYGEN SATURATION: 99 % | BODY MASS INDEX: 24.73 KG/M2 | HEART RATE: 81 BPM | RESPIRATION RATE: 14 BRPM

## 2023-07-07 DIAGNOSIS — Z98.890 S/P ORIF (OPEN REDUCTION INTERNAL FIXATION) FRACTURE: Primary | ICD-10-CM

## 2023-07-07 DIAGNOSIS — Z98.890 S/P ORIF (OPEN REDUCTION INTERNAL FIXATION) FRACTURE: ICD-10-CM

## 2023-07-07 DIAGNOSIS — R42 DIZZINESS: Primary | ICD-10-CM

## 2023-07-07 DIAGNOSIS — Z92.3 HISTORY OF RADIATION EXPOSURE: ICD-10-CM

## 2023-07-07 DIAGNOSIS — Z87.81 S/P ORIF (OPEN REDUCTION INTERNAL FIXATION) FRACTURE: ICD-10-CM

## 2023-07-07 DIAGNOSIS — R42 POSTURAL DIZZINESS WITH PRESYNCOPE: ICD-10-CM

## 2023-07-07 DIAGNOSIS — R55 POSTURAL DIZZINESS WITH PRESYNCOPE: ICD-10-CM

## 2023-07-07 DIAGNOSIS — R90.89 ABNORMAL BRAIN MRI: ICD-10-CM

## 2023-07-07 DIAGNOSIS — Z78.9 ALCOHOL USE: ICD-10-CM

## 2023-07-07 DIAGNOSIS — E78.5 HYPERLIPIDEMIA LDL GOAL <100: ICD-10-CM

## 2023-07-07 DIAGNOSIS — M62.81 GENERALIZED MUSCLE WEAKNESS: ICD-10-CM

## 2023-07-07 DIAGNOSIS — Z87.81 S/P ORIF (OPEN REDUCTION INTERNAL FIXATION) FRACTURE: Primary | ICD-10-CM

## 2023-07-07 LAB
CK SERPL-CCNC: 35 U/L (ref 26–192)
HOLD SPECIMEN: NORMAL
HOLD SPECIMEN: NORMAL

## 2023-07-07 PROCEDURE — G0463 HOSPITAL OUTPT CLINIC VISIT: HCPCS | Mod: 25

## 2023-07-07 PROCEDURE — 99024 POSTOP FOLLOW-UP VISIT: CPT | Performed by: SPECIALIST

## 2023-07-07 PROCEDURE — 73110 X-RAY EXAM OF WRIST: CPT | Mod: RT

## 2023-07-07 PROCEDURE — 82550 ASSAY OF CK (CPK): CPT | Mod: ZL | Performed by: PSYCHIATRY & NEUROLOGY

## 2023-07-07 PROCEDURE — 99205 OFFICE O/P NEW HI 60 MIN: CPT | Performed by: PSYCHIATRY & NEUROLOGY

## 2023-07-07 PROCEDURE — G0463 HOSPITAL OUTPT CLINIC VISIT: HCPCS | Mod: 27

## 2023-07-07 PROCEDURE — G0463 HOSPITAL OUTPT CLINIC VISIT: HCPCS

## 2023-07-07 PROCEDURE — 36415 COLL VENOUS BLD VENIPUNCTURE: CPT | Mod: ZL | Performed by: PSYCHIATRY & NEUROLOGY

## 2023-07-07 ASSESSMENT — PAIN SCALES - GENERAL: PAINLEVEL: NO PAIN (0)

## 2023-07-07 NOTE — PROGRESS NOTES
Neurology Clinic - New Consultation  7/7/2023    Reason for Consultation: Abnormal brain MRI    Requesting Provider: Dr. Gunter    History of Presenting Symptoms:  Yanci Luther is a 78 year old female who presents today for evaluation of dizziness.  She has had this for at least 7 years.   She describes it as a lightheadedness.  She feels foggy.  She does not describe vertigo.   She has not noticed any abnormal eye movements.    She feel feels sometimes her vision is blurred during this as well.      Son at bedside notes that about 7 years ago she would get dizzy.  Sometimes when walking but also at times just with sitting still.   In 2017 they did not small vessel ischemic disease.    Her son would check her pulse, O2, BP, glucose during the episodes.   The episodes would only last 30 seconds to 90 seconds and then would go away.  They would happen about once weekly.  They did cut caffeine out and stated that episodes disappeared completely for about 2 years.  While traveling in [place] she has a episode where she was sitting and eating when she lost consciousness in the setting of vomiting.   They went to a hospital in [place] that showed a slightly elevated troponin.  They went to [facility] who ran a battery of tests including Echo, EKG and Angiogram that showed no blockage.  While she was there she had 2 further syncope episodes.  CArdiologist told them whatever caused her syncope had nothing to do with her heart.    She did have a 14 day heart monitor where they state they captured event.   She has not noticed any triggers such as head movement.  She has never had an episode while lying flat despite dozens of other episodes.    This year she had increased frequency of her episodes.  She would have muscle weakness that seemed to accompany that.   Yesterday they state she had an episode where she had a dizzy episode and having trouble seeing the lower half of faces that went away within 30 seconds to a minute.       Her LDL is somewhat high in 130s.  They state her BP has been higher since she broke her wrist.   In addition she does have history of squamous cell cancer of the tongue for which he received radiation many years ago.    ROS: Complete 10-point review of systems was negative except as noted in the HPI.    Past Medical History:  Patient Active Problem List   Diagnosis     BPPV (benign paroxysmal positional vertigo)     Cerebrovascular small vessel disease     Mixed hyperlipidemia     Obstructive lung disease (H)     Personal history of malignant neoplasm of tongue     Refusal of aspirin by patient     Refusal of statin medication by patient     Seborrheic keratosis     Senile osteoporosis       Past Surgical History:  Past Surgical History:   Procedure Laterality Date     ARTHROSCOPY KNEE      4-2012,[facility]     ARTHROSCOPY KNEE      9-2012,meniscus tear, Dr. Vitale     ARTHROTOMY WRIST      2011,L radius/ulna fracture, s/p plating     COLONOSCOPY  08/06/2009    follow up 10 years, 8/6/19     colonoscopy  09/11/2019    large >1cm sessile adenoma, f/u 1 yr, 2020     COLONOSCOPY N/A 9/22/2020    3 tubular adenomas, 1 sessile, follow up 3 years, 9/22/2023     OPEN REDUCTION INTERNAL FIXATION WRIST Right 6/23/2023    Procedure: OPEN REDUCTION INTERNAL FIXATION, FRACTURE, WRIST;  Surgeon: Manjit Cote MD;  Location: [facility]       PCP: Janes Gunter    Allergies:   Allergies   Allergen Reactions     Aspirin Other (See Comments)     -- Refuses as of 8/31/2017  Other reaction(s): Other - Describe In Comment Field  -- Refuses as of 8/31/2017     Statins Other (See Comments)     -- Refuses as of 8/31/2017  Other reaction(s): Other - Describe In Comment Field  -- Refuses as of 8/31/2017     Tetracycline GI Disturbance       Medications:  Current Outpatient Medications   Medication Sig Dispense Refill     calcium-magnesium (CALMAG) 500-250 MG TABS per tablet Take 1 tablet by mouth 2 times daily       Cholecalciferol  "(VITAMIN D) 2000 units CAPS Take 1 capsule by mouth daily       ELDERBERRY PO Elderberry juice       ondansetron (ZOFRAN) 4 MG tablet Take 4 mg by mouth every 8 hours as needed for nausea         Family History:  Family History   Adopted: Yes   Problem Relation Age of Onset     Genetic Disorder Other         Genetic,Adopted     Breast Cancer No family hx of         Cancer-breast       Social History:  Social History     Socioeconomic History     Marital status:      Spouse name: Not on file     Number of children: Not on file     Years of education: Not on file     Highest education level: Not on file   Occupational History     Not on file   Tobacco Use     Smoking status: Former     Smokeless tobacco: Never     Tobacco comments:     \"quit about 30 years ago\"   Vaping Use     Vaping Use: Never used   Substance and Sexual Activity     Alcohol use: Yes     Comment: 3 wine or beer daily     Drug use: Never     Sexual activity: Yes     Partners: Male   Other Topics Concern     Parent/sibling w/ CABG, MI or angioplasty before 65F 55M? Not Asked   Social History Narrative    She is originally from [facility] [facility].   She lives in [place] in the summer with her daughter and fung in the [facility].[place] and [place].  Formerly worked with stained glass.     Social Determinants of Health     Financial Resource Strain: Not on file   Food Insecurity: Not on file   Transportation Needs: Not on file   Physical Activity: Not on file   Stress: Not on file   Social Connections: Not on file   Intimate Partner Violence: Not on file   Housing Stability: Not on file     Alcohol: does drink 3 drinks nightly, history of more heavy use  Tobacco: denies  Illicit drugs: denies  C    Physical Exam:  Vitals: BP (!) 148/88   Pulse 81   Temp 97.7  F (36.5  C) (Tympanic)   Resp 14   Wt 63.3 kg (139 lb 9.6 oz)   LMP 08/01/1994 (Approximate)   SpO2 99%   BMI 24.73 kg/m     Orthostati cvitals 166/100 laying pulse of 72 and 182/100 standing " pulse of 65.   General: Seated comfortably in no acute distress.  Heart: Regular rate  Lungs: breathing comfortably  Extremities: Mild edema  Skin: No rashes  Neurologic:     Mental Status: Fully alert, attentive and oriented. Speech clear and fluent, no paraphasic errors.     Cranial Nerves:  PERRL. EOMI with no nystagmus. Facial sensation intact/symmetric. Facial movements symmetric. Hearing not formally tested but intact to conversation. Palate elevation symmetric, uvula midline. No dysarthria.  Tongue protrusion midline.     Motor: Neck flexion 5 out of 5, neck extension 5 out of 5 no tremors or other abnormal movements observed. Muscle tone normal throughout. . Strength 5/5 throughout upper and lower extremities.     Deep Tendon Reflexes: 2+ symmetric in uppers except could not assess right brachioradialis due to recent fracture.  Lower extremities 2+ in the patella, trace Achilles.  No clonus.     Sensory: Intact to light touch throughout upper and lower extremities.  No sensory extinction.  Proprioception present in the bilateral distal lower extremities   coordination: Finger-nose-finger and heel-shin intact without dysmetria.      Gait: Normal, steady casual gait. Able to tandem, good arm swing, slow but not stooped or shuffling.      Pertinent Investigations:  Brain MRI reviewed personally:  No abnormal enhancment.  Mild atrophy and small vessel ischemic changes.      Assessment:  #abnormal brain MRI  #Dizziness  #subjective weakness  #alcohol use  #history of radiation  #presyncope  #Hyperlipidemia    Ms. Luther is a 78-year-old female with past medical history noted above who is presenting for episodes of dizziness in the setting of an abnormal brain MRI.  Dizziness episodes sound presyncopal and there have been a few truly syncopal events.  They have all occurred while upright and she has never had 1 while lying flat.  Does not sound to be BPPV or other vertigo like disorders such as Ménière's.  I spent  much time explaining that I do not think her brain MRI findings are the cause of this.  Possible that could be a secondary effect if she has vertebrobasilar stenosis due to either atherosclerosis or her previous history of radiation effect.  But the small vessel ischemic changes themselves are not the source of her syncope.   Possibly her previous history of radiation could expose her to a radiation vasculopathy and may put her at risk for presyncope.  We will get a CTA to look for vertebrobasilar stenosis due to atherosclerosis or radiation.  Potentially would add lipid-lowering therapy or aspirin if present.  I did not detect any weakness today and suspect her weakness is with her presyncopal symptoms.  However we will check a CK as a screen.  In addition and recommended a trial of physical therapy as well as nonpharmacologic measures to slightly increased blood pressure including increased water, salt use, and compression stockings especially during the daytime when she is symptomatic.  Family will  compression stockings.  Lastly I suspect her visual symptoms during a dizziness event are secondary to low perfusion, but did recommend she see an eye doctor to rule out any ocular pathology in the interim    Plan:  CK  CTA head and neck to look for vertebrobasilar stenosis (will consider aspirin and statin if present)  Ophtho consult  PT  Compression stockings  Increase water and salt use  Daily BP's to bring to follow up.  Can consider midodrine vs antihypertensive pending her symptoms and BP readings    Follow up in Neurology clinic in 4 months or earlier as needed should new concerns arise.    Aditi Roy DO   of Neurology      61 min spent on the date of the encounter in  10 min chart review,  40 min patient visit, review of tests, 11 min documentation and/or discussion with other providers about the issues documented above.     Dragon disclaimer: This documentation was completed  with the aid of dictation software.  Please note that there may be some inconsistencies due to software errors.  Errors are corrected in real time, however if there is a remaining error, please do not hesitate to reach out for clarification.

## 2023-07-07 NOTE — PROGRESS NOTES
Patient is here for follow up on her right wrist ORIF.   Judy Bond LPN .....................7/7/2023 10:37 AM

## 2023-07-07 NOTE — LETTER
7/7/2023         RE: Yanci Luther  [address]  [facility] [address]        Dear Colleague,    Thank you for referring your patient, Yanci Luther, to the [facility]. Please see a copy of my visit note below.    Neurology Clinic - New Consultation  7/7/2023    Reason for Consultation: Abnormal brain MRI    Requesting Provider: Dr. Gunter    History of Presenting Symptoms:  Yanci Luther is a 78 year old female who presents today for evaluation of dizziness.  She has had this for at least 7 years.   She describes it as a lightheadedness.  She feels foggy.  She does not describe vertigo.   She has not noticed any abnormal eye movements.    She feel feels sometimes her vision is blurred during this as well.      Son at bedside notes that about 7 years ago she would get dizzy.  Sometimes when walking but also at times just with sitting still.   In 2017 they did not small vessel ischemic disease.    Her son would check her pulse, O2, BP, glucose during the episodes.   The episodes would only last 30 seconds to 90 seconds and then would go away.  They would happen about once weekly.  They did cut caffeine out and stated that episodes disappeared completely for about 2 years.  While traveling in [place] she has a episode where she was sitting and eating when she lost consciousness in the setting of vomiting.   They went to a hospital in [place] that showed a slightly elevated troponin.  They went to [place] who ran a battery of tests including Echo, EKG and Angiogram that showed no blockage.  While she was there she had 2 further syncope episodes.  CArdiologist told them whatever caused her syncope had nothing to do with her heart.    She did have a 14 day heart monitor where they state they captured event.   She has not noticed any triggers such as head movement.  She has never had an episode while lying flat despite dozens of other episodes.    This year she had increased frequency of her  episodes.  She would have muscle weakness that seemed to accompany that.   Yesterday they state she had an episode where she had a dizzy episode and having trouble seeing the lower half of faces that went away within 30 seconds to a minute.      Her LDL is somewhat high in 130s.  They state her BP has been higher since she broke her wrist.   In addition she does have history of squamous cell cancer of the tongue for which he received radiation many years ago.    ROS: Complete 10-point review of systems was negative except as noted in the HPI.    Past Medical History:  Patient Active Problem List   Diagnosis     BPPV (benign paroxysmal positional vertigo)     Cerebrovascular small vessel disease     Mixed hyperlipidemia     Obstructive lung disease (H)     Personal history of malignant neoplasm of tongue     Refusal of aspirin by patient     Refusal of statin medication by patient     Seborrheic keratosis     Senile osteoporosis       Past Surgical History:  Past Surgical History:   Procedure Laterality Date     ARTHROSCOPY KNEE      4-2012,[place]     ARTHROSCOPY KNEE      9-2012,meniscus tear, Dr. Vitale     ARTHROTOMY WRIST      2011,L radius/ulna fracture, s/p plating     COLONOSCOPY  08/06/2009    follow up 10 years, 8/6/19     colonoscopy  09/11/2019    large >1cm sessile adenoma, f/u 1 yr, 2020     COLONOSCOPY N/A 9/22/2020    3 tubular adenomas, 1 sessile, follow up 3 years, 9/22/2023     OPEN REDUCTION INTERNAL FIXATION WRIST Right 6/23/2023    Procedure: OPEN REDUCTION INTERNAL FIXATION, FRACTURE, WRIST;  Surgeon: Manjit Cote MD;  Location: [facility]       PCP: Janes Gunter    Allergies:   Allergies   Allergen Reactions     Aspirin Other (See Comments)     -- Refuses as of 8/31/2017  Other reaction(s): Other - Describe In Comment Field  -- Refuses as of 8/31/2017     Statins Other (See Comments)     -- Refuses as of 8/31/2017  Other reaction(s): Other - Describe In Comment Field  -- Refuses as of  "8/31/2017     Tetracycline GI Disturbance       Medications:  Current Outpatient Medications   Medication Sig Dispense Refill     calcium-magnesium (CALMAG) 500-250 MG TABS per tablet Take 1 tablet by mouth 2 times daily       Cholecalciferol (VITAMIN D) 2000 units CAPS Take 1 capsule by mouth daily       ELDERBERRY PO Elderberry juice       ondansetron (ZOFRAN) 4 MG tablet Take 4 mg by mouth every 8 hours as needed for nausea         Family History:  Family History   Adopted: Yes   Problem Relation Age of Onset     Genetic Disorder Other         Genetic,Adopted     Breast Cancer No family hx of         Cancer-breast       Social History:  Social History     Socioeconomic History     Marital status:      Spouse name: Not on file     Number of children: Not on file     Years of education: Not on file     Highest education level: Not on file   Occupational History     Not on file   Tobacco Use     Smoking status: Former     Smokeless tobacco: Never     Tobacco comments:     \"quit about 30 years ago\"   Vaping Use     Vaping Use: Never used   Substance and Sexual Activity     Alcohol use: Yes     Comment: 3 wine or beer daily     Drug use: Never     Sexual activity: Yes     Partners: Male   Other Topics Concern     Parent/sibling w/ CABG, MI or angioplasty before 65F 55M? Not Asked   Social History Narrative    She is originally from [facility].   She lives in [place] in the summer with her daughter and fun[place] in [place].  Formerly worked with stained glass.     Social Determinants of Health     Financial Resource Strain: Not on file   Food Insecurity: Not on file   Transportation Needs: Not on file   Physical Activity: Not on file   Stress: Not on file   Social Connections: Not on file   Intimate Partner Violence: Not on file   Housing Stability: Not on file     Alcohol: does drink 3 drinks nightly, history of more heavy use  Tobacco: denies  Illicit drugs: denies  C    Physical " Exam:  Vitals: BP (!) 148/88   Pulse 81   Temp 97.7  F (36.5  C) (Tympanic)   Resp 14   Wt 63.3 kg (139 lb 9.6 oz)   LMP 08/01/1994 (Approximate)   SpO2 99%   BMI 24.73 kg/m     Orthostati cvitals 166/100 laying pulse of 72 and 182/100 standing pulse of 65.   General: Seated comfortably in no acute distress.  Heart: Regular rate  Lungs: breathing comfortably  Extremities: Mild edema  Skin: No rashes  Neurologic:     Mental Status: Fully alert, attentive and oriented. Speech clear and fluent, no paraphasic errors.     Cranial Nerves:  PERRL. EOMI with no nystagmus. Facial sensation intact/symmetric. Facial movements symmetric. Hearing not formally tested but intact to conversation. Palate elevation symmetric, uvula midline. No dysarthria.  Tongue protrusion midline.     Motor: Neck flexion 5 out of 5, neck extension 5 out of 5 no tremors or other abnormal movements observed. Muscle tone normal throughout. . Strength 5/5 throughout upper and lower extremities.     Deep Tendon Reflexes: 2+ symmetric in uppers except could not assess right brachioradialis due to recent fracture.  Lower extremities 2+ in the patella, trace Achilles.  No clonus.     Sensory: Intact to light touch throughout upper and lower extremities.  No sensory extinction.  Proprioception present in the bilateral distal lower extremities   coordination: Finger-nose-finger and heel-shin intact without dysmetria.      Gait: Normal, steady casual gait. Able to tandem, good arm swing, slow but not stooped or shuffling.      Pertinent Investigations:  Brain MRI reviewed personally:  No abnormal enhancment.  Mild atrophy and small vessel ischemic changes.      Assessment:  #abnormal brain MRI  #Dizziness  #subjective weakness  #alcohol use  #history of radiation  #presyncope  #Hyperlipidemia    Ms. Luther is a 78-year-old female with past medical history noted above who is presenting for episodes of dizziness in the setting of an abnormal brain MRI.   Dizziness episodes sound presyncopal and there have been a few truly syncopal events.  They have all occurred while upright and she has never had 1 while lying flat.  Does not sound to be BPPV or other vertigo like disorders such as Ménière's.  I spent much time explaining that I do not think her brain MRI findings are the cause of this.  Possible that could be a secondary effect if she has vertebrobasilar stenosis due to either atherosclerosis or her previous history of radiation effect.  But the small vessel ischemic changes themselves are not the source of her syncope.   Possibly her previous history of radiation could expose her to a radiation vasculopathy and may put her at risk for presyncope.  We will get a CTA to look for vertebrobasilar stenosis due to atherosclerosis or radiation.  Potentially would add lipid-lowering therapy or aspirin if present.  I did not detect any weakness today and suspect her weakness is with her presyncopal symptoms.  However we will check a CK as a screen.  In addition and recommended a trial of physical therapy as well as nonpharmacologic measures to slightly increased blood pressure including increased water, salt use, and compression stockings especially during the daytime when she is symptomatic.  Family will  compression stockings.  Lastly I suspect her visual symptoms during a dizziness event are secondary to low perfusion, but did recommend she see an eye doctor to rule out any ocular pathology in the interim    Plan:  CK  CTA head and neck to look for vertebrobasilar stenosis (will consider aspirin and statin if present)  Ophtho consult  PT  Compression stockings  Increase water and salt use  Daily BP's to bring to follow up.  Can consider midodrine vs antihypertensive pending her symptoms and BP readings    Follow up in Neurology clinic in 4 months or earlier as needed should new concerns arise.    Aditi Roy DO   of Neurology      61 min  spent on the date of the encounter in  10 min chart review,  40 min patient visit, review of tests, 11 min documentation and/or discussion with other providers about the issues documented above.     Dragon disclaimer: This documentation was completed with the aid of dictation software.  Please note that there may be some inconsistencies due to software errors.  Errors are corrected in real time, however if there is a remaining error, please do not hesitate to reach out for clarification.       Again, thank you for allowing me to participate in the care of your patient.        Sincerely,        Aditi Roy MD

## 2023-07-07 NOTE — PROGRESS NOTES
Visit Date: 2023    The patient is a patient of Dr. Cote's, status post ORIF of her right distal radius 2 weeks ago.  She is back today, doing well.  The patient was also noted to have a distal ulna fracture.    PHYSICAL EXAMINATION:  Today, reveals her incision to be healing nicely.  She has mild limitation of digital range of motion.  Pronation and supination is tender over the ulna.    IMAGING:  Plain film radiographs, 3 views of the right wrist reveal the fracture to be well positioned.    IMPRESSION:  Two weeks status post ORIF, right distal radius.      PLAN:  Because of her pain with pronation and supination, we are going to put her in a sugar tong splint.  She will see Dr. Cote in about 2 weeks and at that point, I think a short arm splint will likely be doable.  We discussed range of motion in the interim.    Oc Juarez MD        D: 2023   T: 2023   MT: yuliya    Name:     SHANIQUE BANKSLarisa  MRN:      [id number]        Account:    [id number]   :      1945           Visit Date: 2023     Document: [id number]

## 2023-07-07 NOTE — NURSING NOTE
"Chief Complaint   Patient presents with     Dizziness       Initial BP (!) 148/88   Pulse 81   Temp 97.7  F (36.5  C) (Tympanic)   Resp 14   Wt 63.3 kg (139 lb 9.6 oz)   LMP 08/01/1994 (Approximate)   SpO2 99%   BMI 24.73 kg/m   Estimated body mass index is 24.73 kg/m  as calculated from the following:    Height as of 6/26/23: 1.6 m (5' 3\").    Weight as of this encounter: 63.3 kg (139 lb 9.6 oz).  FOOD SECURITY SCREENING QUESTIONS  Hunger Vital Signs:  Within the past 12 months we worried whether our food would run out before we got money to buy more. Never  Within the past 12 months the food we bought just didn't last and we didn't have money to get more. Never        Tyler Avila, LPN    "

## 2023-07-07 NOTE — PATIENT INSTRUCTIONS
Reason for today's visit: dizziness    Your diagnosis: unclear at this time    Tests that you will need:   CK  CTA head and neck    Treatment plan:   PT  OK to add salt to your diet  Compression stockings during the day.        Your test results are reviewed several times a day.  Once they are all in, you will be sent a letter with your results and/or if you are signed up for on-line services, you will be e-mailed the results.  If there are serious findings, you typically will be called.    If you have any questions about your visit, your symptoms, your medication, your test results or it is not clear what your diagnosis or treatment plan is please contact our office at [phone] for general neurology    If you need follow-up in the future, please call [phone] for an appointment.      Aditi Roy DO  Neurology

## 2023-07-14 ENCOUNTER — TRANSFERRED RECORDS (OUTPATIENT)
Dept: HEALTH INFORMATION MANAGEMENT | Facility: CLINIC | Age: 78
End: 2023-07-14

## 2023-07-14 ENCOUNTER — HOSPITAL ENCOUNTER (OUTPATIENT)
Dept: CT IMAGING | Facility: OTHER | Age: 78
Discharge: HOME | End: 2023-07-14
Attending: PSYCHIATRY & NEUROLOGY | Admitting: PSYCHIATRY & NEUROLOGY
Payer: COMMERCIAL

## 2023-07-14 DIAGNOSIS — R42 DIZZINESS: ICD-10-CM

## 2023-07-14 PROCEDURE — 250N000011 HC RX IP 250 OP 636: Performed by: PSYCHIATRY & NEUROLOGY

## 2023-07-14 PROCEDURE — 70496 CT ANGIOGRAPHY HEAD: CPT

## 2023-07-14 PROCEDURE — 70498 CT ANGIOGRAPHY NECK: CPT

## 2023-07-14 RX ORDER — IOPAMIDOL 755 MG/ML
75 INJECTION, SOLUTION INTRAVASCULAR ONCE
Status: COMPLETED | OUTPATIENT
Start: 2023-07-14 | End: 2023-07-14

## 2023-07-14 RX ADMIN — IOPAMIDOL 75 ML: 755 INJECTION, SOLUTION INTRAVENOUS at 16:40

## 2023-07-18 ENCOUNTER — TRANSFERRED RECORDS (OUTPATIENT)
Dept: HEALTH INFORMATION MANAGEMENT | Facility: OTHER | Age: 78
End: 2023-07-18
Payer: COMMERCIAL

## 2023-07-19 DIAGNOSIS — Z87.81 S/P ORIF (OPEN REDUCTION INTERNAL FIXATION) FRACTURE: Primary | ICD-10-CM

## 2023-07-19 DIAGNOSIS — Z98.890 S/P ORIF (OPEN REDUCTION INTERNAL FIXATION) FRACTURE: Primary | ICD-10-CM

## 2023-07-25 ENCOUNTER — TELEPHONE (OUTPATIENT)
Dept: SURGERY | Facility: OTHER | Age: 78
End: 2023-07-25

## 2023-07-25 ENCOUNTER — OFFICE VISIT (OUTPATIENT)
Dept: ORTHOPEDICS | Facility: OTHER | Age: 78
End: 2023-07-25
Attending: ORTHOPAEDIC SURGERY
Payer: COMMERCIAL

## 2023-07-25 ENCOUNTER — HOSPITAL ENCOUNTER (OUTPATIENT)
Dept: GENERAL RADIOLOGY | Facility: OTHER | Age: 78
Discharge: HOME | End: 2023-07-25
Attending: ORTHOPAEDIC SURGERY
Payer: COMMERCIAL

## 2023-07-25 DIAGNOSIS — Z98.890 S/P ORIF (OPEN REDUCTION INTERNAL FIXATION) FRACTURE: Primary | ICD-10-CM

## 2023-07-25 DIAGNOSIS — Z87.81 S/P ORIF (OPEN REDUCTION INTERNAL FIXATION) FRACTURE: ICD-10-CM

## 2023-07-25 DIAGNOSIS — Z87.81 S/P ORIF (OPEN REDUCTION INTERNAL FIXATION) FRACTURE: Primary | ICD-10-CM

## 2023-07-25 DIAGNOSIS — Z98.890 S/P ORIF (OPEN REDUCTION INTERNAL FIXATION) FRACTURE: ICD-10-CM

## 2023-07-25 PROCEDURE — 99024 POSTOP FOLLOW-UP VISIT: CPT | Performed by: ORTHOPAEDIC SURGERY

## 2023-07-25 PROCEDURE — 73110 X-RAY EXAM OF WRIST: CPT | Mod: RT

## 2023-07-25 PROCEDURE — G0463 HOSPITAL OUTPT CLINIC VISIT: HCPCS

## 2023-07-25 NOTE — PROGRESS NOTES
Patient is here for follow up on her right wrist ORIF.   Judy Bond LPN .....................7/25/2023 9:12 AM

## 2023-07-25 NOTE — TELEPHONE ENCOUNTER
Can you look at this.  Patient was to follow up in three years for sessile and tubular adenomas.  Hilda Roberts LPN..........7/25/2023  9:40 AM

## 2023-07-25 NOTE — PROGRESS NOTES
Visit Date: 2023    REASON FOR EVALUATION:  Right distal radius ORIF.    HISTORY OF PRESENT ILLNESS:  Shanique comes back.  She is 4 weeks distal radius ORIF.  Overall, is doing fairly well.  Does have pain in the lateral side, but feels like she is at least doing reasonable at this time.  She is here for x-rays and examination.    PHYSICAL EXAMINATION:  Examination of the wrist does show reasonable range of motion.  She lacks just a little bit of range of motion as far as extension as well as supination.  Neurovascular examination otherwise intact.    IMAGING:  X-rays were reviewed, does show a properly healing distal radius fracture in appropriate alignment and position at this current time.  Does have a distal ulnar fracture as well that is maintaining proper alignment.    IMPRESSION:  Right distal radius fracture, status post ORIF.    PLAN:  Continue to work on flexibility.  Continue fracture precautions,  still okay to advocate for some usage of her brace.  Plan for revisit with myself here otherwise in a month.  X-rays, 3 views of the wrist at that time.  I will see her either in [place] or [place], or here depending on her timing.    Manjit Cote MD        D: 2023   T: 2023   MT: BILLY    Name:     SHANIQUE BANKS  MRN:      [id number]        Account:    [id number]   :      1945           Visit Date: 2023     Document: [id number]

## 2023-07-25 NOTE — TELEPHONE ENCOUNTER
Spouse called today and stated they had received a letter for patient to schedule a colonoscopy.  He states that per Dr. Gunter c-scopes are not needed after the age of 75.  He states that since patient is now 78 year of age they will not be scheduling this procedure and would like her removed from the reminder list.    Mona Khan on 7/25/2023 at 9:25 AM     Detail Level: Zone Plan: Recommended chemical peels, microneedling and IPL with Nissa

## 2023-07-31 DIAGNOSIS — Z12.11 ENCOUNTER FOR SCREENING COLONOSCOPY: Primary | ICD-10-CM

## 2023-07-31 NOTE — TELEPHONE ENCOUNTER
Screening Questions for the Scheduling of Screening Colonoscopies   (If Colonoscopy is diagnostic, Provider should review the chart before scheduling.)  Are you younger than 50 or older than 80?  NO  Do you take aspirin or fish oil?  NO (if yes, tell patient to stop 1 week prior to Colonoscopy)  Do you take warfarin (Coumadin), clopidogrel (Plavix), apixaban (Eliquis), dabigatram (Pradaxa), rivaroxaban (Xarelto) or any blood thinner? NO  Do you use oxygen at home?  NO  Do you have kidney disease? NO  Are you on dialysis? NO  Have you had a stroke or heart attack in the last year? NO  Have you had a stent in your heart or any blood vessel in the last year? NO  Have you had a transplant of any organ? NO  Have you had a colonoscopy or upper endoscopy (EGD) before? YES         When?  2020  Date of scheduled Colonoscopy. 10/16/2023  Provider STEFFEN  Pharmacy [facility]

## 2023-08-14 RX ORDER — POLYETHYLENE GLYCOL 3350, SODIUM CHLORIDE, SODIUM BICARBONATE, POTASSIUM CHLORIDE 420; 11.2; 5.72; 1.48 G/4L; G/4L; G/4L; G/4L
4000 POWDER, FOR SOLUTION ORAL ONCE
Qty: 4000 ML | Refills: 0 | Status: SHIPPED | OUTPATIENT
Start: 2023-10-09 | End: 2023-10-09

## 2023-08-14 RX ORDER — BISACODYL 5 MG/1
TABLET, DELAYED RELEASE ORAL
Qty: 2 TABLET | Refills: 0 | Status: SHIPPED | OUTPATIENT
Start: 2023-10-09 | End: 2024-07-15

## 2023-09-18 ENCOUNTER — TELEPHONE (OUTPATIENT)
Dept: FAMILY MEDICINE | Facility: OTHER | Age: 78
End: 2023-09-18
Payer: COMMERCIAL

## 2023-09-18 NOTE — TELEPHONE ENCOUNTER
She is not due for labs from my perspective.  Not sure if there are labs ordered from her infusion treatments.

## 2023-09-18 NOTE — TELEPHONE ENCOUNTER
Labs ordered prior to infusion. Infusion to release labs. Meredith Chowdhury LPN on 9/18/2023 at 10:11 AM

## 2023-09-25 ENCOUNTER — LAB (OUTPATIENT)
Dept: LAB | Facility: OTHER | Age: 78
End: 2023-09-25
Attending: FAMILY MEDICINE
Payer: COMMERCIAL

## 2023-09-25 ENCOUNTER — HOSPITAL ENCOUNTER (OUTPATIENT)
Dept: INFUSION THERAPY | Facility: OTHER | Age: 78
Discharge: HOME | End: 2023-09-25
Attending: FAMILY MEDICINE
Payer: COMMERCIAL

## 2023-09-25 VITALS
HEART RATE: 72 BPM | DIASTOLIC BLOOD PRESSURE: 77 MMHG | SYSTOLIC BLOOD PRESSURE: 145 MMHG | BODY MASS INDEX: 25.79 KG/M2 | WEIGHT: 145.6 LBS | RESPIRATION RATE: 16 BRPM | TEMPERATURE: 97.6 F

## 2023-09-25 DIAGNOSIS — M81.0 SENILE OSTEOPOROSIS: Primary | ICD-10-CM

## 2023-09-25 LAB
CALCIUM SERPL-MCNC: 10.4 MG/DL (ref 8.8–10.2)
CREAT SERPL-MCNC: 0.65 MG/DL (ref 0.51–0.95)
EGFRCR SERPLBLD CKD-EPI 2021: 89 ML/MIN/1.73M2
HOLD SPECIMEN: NORMAL

## 2023-09-25 PROCEDURE — G0008 ADMIN INFLUENZA VIRUS VAC: HCPCS | Performed by: FAMILY MEDICINE

## 2023-09-25 PROCEDURE — 82310 ASSAY OF CALCIUM: CPT | Performed by: FAMILY MEDICINE

## 2023-09-25 PROCEDURE — 250N000011 HC RX IP 250 OP 636: Mod: JZ | Performed by: FAMILY MEDICINE

## 2023-09-25 PROCEDURE — 90662 IIV NO PRSV INCREASED AG IM: CPT | Performed by: FAMILY MEDICINE

## 2023-09-25 PROCEDURE — 96365 THER/PROPH/DIAG IV INF INIT: CPT

## 2023-09-25 PROCEDURE — 82565 ASSAY OF CREATININE: CPT | Performed by: FAMILY MEDICINE

## 2023-09-25 PROCEDURE — 258N000003 HC RX IP 258 OP 636: Performed by: FAMILY MEDICINE

## 2023-09-25 RX ORDER — ALBUTEROL SULFATE 90 UG/1
1-2 AEROSOL, METERED RESPIRATORY (INHALATION)
Status: CANCELLED
Start: 2024-09-24

## 2023-09-25 RX ORDER — DIPHENHYDRAMINE HYDROCHLORIDE 50 MG/ML
50 INJECTION INTRAMUSCULAR; INTRAVENOUS
Status: CANCELLED
Start: 2024-09-24

## 2023-09-25 RX ORDER — ALBUTEROL SULFATE 0.83 MG/ML
2.5 SOLUTION RESPIRATORY (INHALATION)
Status: CANCELLED | OUTPATIENT
Start: 2024-09-24

## 2023-09-25 RX ORDER — HEPARIN SODIUM,PORCINE 10 UNIT/ML
5-20 VIAL (ML) INTRAVENOUS DAILY PRN
Status: CANCELLED | OUTPATIENT
Start: 2024-09-24

## 2023-09-25 RX ORDER — HEPARIN SODIUM (PORCINE) LOCK FLUSH IV SOLN 100 UNIT/ML 100 UNIT/ML
5 SOLUTION INTRAVENOUS
Status: CANCELLED | OUTPATIENT
Start: 2024-09-24

## 2023-09-25 RX ORDER — ZOLEDRONIC ACID 5 MG/100ML
5 INJECTION, SOLUTION INTRAVENOUS ONCE
Status: CANCELLED
Start: 2024-09-24

## 2023-09-25 RX ORDER — METHYLPREDNISOLONE SODIUM SUCCINATE 125 MG/2ML
125 INJECTION, POWDER, LYOPHILIZED, FOR SOLUTION INTRAMUSCULAR; INTRAVENOUS
Status: CANCELLED
Start: 2024-09-24

## 2023-09-25 RX ORDER — EPINEPHRINE 1 MG/ML
0.3 INJECTION, SOLUTION, CONCENTRATE INTRAVENOUS EVERY 5 MIN PRN
Status: CANCELLED | OUTPATIENT
Start: 2024-09-24

## 2023-09-25 RX ORDER — ZOLEDRONIC ACID 5 MG/100ML
5 INJECTION, SOLUTION INTRAVENOUS ONCE
Status: COMPLETED | OUTPATIENT
Start: 2023-09-25 | End: 2023-09-25

## 2023-09-25 RX ORDER — MEPERIDINE HYDROCHLORIDE 50 MG/ML
25 INJECTION INTRAMUSCULAR; INTRAVENOUS; SUBCUTANEOUS EVERY 30 MIN PRN
Status: CANCELLED | OUTPATIENT
Start: 2024-09-24

## 2023-09-25 RX ADMIN — INFLUENZA A VIRUS A/VICTORIA/4897/2022 IVR-238 (H1N1) ANTIGEN (FORMALDEHYDE INACTIVATED), INFLUENZA A VIRUS A/DARWIN/9/2021 SAN-010 (H3N2) ANTIGEN (FORMALDEHYDE INACTIVATED), INFLUENZA B VIRUS B/PHUKET/3073/2013 ANTIGEN (FORMALDEHYDE INACTIVATED), AND INFLUENZA B VIRUS B/MICHIGAN/01/2021 ANTIGEN (FORMALDEHYDE INACTIVATED) 0.7 ML: 60; 60; 60; 60 INJECTION, SUSPENSION INTRAMUSCULAR at 14:52

## 2023-09-25 RX ADMIN — ZOLEDRONIC ACID 5 MG: 0.05 INJECTION, SOLUTION INTRAVENOUS at 13:45

## 2023-09-25 RX ADMIN — SODIUM CHLORIDE 250 ML: 9 INJECTION, SOLUTION INTRAVENOUS at 13:42

## 2023-09-25 NOTE — NURSING NOTE
Infusion Nursing Note:  Yanci Luther presents today for Reclast.    Patient seen by provider today: No   present during visit today: Not Applicable.    Note: Influenza vaccine administered to patient's right deltoid per patient request, see MAR. Patient tolerated without difficulty.       Intravenous Access:  Labs drawn without difficulty per .  Peripheral IV placed.    Treatment Conditions:  Lab Results   Component Value Date     (L) 06/22/2023    POTASSIUM 4.1 06/22/2023    CR 0.65 09/25/2023    FARSHAD 10.4 (H) 09/25/2023    BILITOTAL 0.4 06/22/2023    ALBUMIN 4.2 06/22/2023    ALT 22 06/22/2023    AST 39 06/22/2023       Results reviewed, labs MET treatment parameters, ok to proceed with treatment.      Post Infusion Assessment:  Patient tolerated infusion without incident.  Patient tolerated injection without incident.  Blood return noted pre and post infusion.  Site patent and intact, free from redness, edema or discomfort.  No evidence of extravasations.  Access discontinued per protocol.       Discharge Plan:   Discharge instructions reviewed with: Patient.  Patient and/or family verbalized understanding of discharge instructions and all questions answered.  Copy of AVS reviewed with patient and/or family.    Patient discharged in stable condition accompanied by: self.  Departure Mode: Ambulatory.      Cecilia Mast RN

## 2023-10-11 PROBLEM — Z86.0101 H/O ADENOMATOUS POLYP OF COLON: Status: ACTIVE | Noted: 2023-10-11

## 2023-10-16 ENCOUNTER — ANESTHESIA EVENT (OUTPATIENT)
Dept: SURGERY | Facility: OTHER | Age: 78
End: 2023-10-16
Payer: COMMERCIAL

## 2023-10-16 ENCOUNTER — ANESTHESIA (OUTPATIENT)
Dept: SURGERY | Facility: OTHER | Age: 78
End: 2023-10-16
Payer: COMMERCIAL

## 2023-10-16 ENCOUNTER — HOSPITAL ENCOUNTER (OUTPATIENT)
Facility: OTHER | Age: 78
Discharge: HOME | End: 2023-10-16
Attending: SURGERY | Admitting: SURGERY
Payer: COMMERCIAL

## 2023-10-16 VITALS
TEMPERATURE: 97.5 F | DIASTOLIC BLOOD PRESSURE: 97 MMHG | OXYGEN SATURATION: 96 % | SYSTOLIC BLOOD PRESSURE: 167 MMHG | HEART RATE: 61 BPM | HEIGHT: 64 IN | BODY MASS INDEX: 23.39 KG/M2 | RESPIRATION RATE: 16 BRPM | WEIGHT: 137 LBS

## 2023-10-16 PROBLEM — K57.30 SIGMOID DIVERTICULOSIS: Status: ACTIVE | Noted: 2023-10-16

## 2023-10-16 PROBLEM — K63.5 COLON POLYPS: Status: ACTIVE | Noted: 2023-10-16

## 2023-10-16 PROCEDURE — 250N000011 HC RX IP 250 OP 636: Performed by: NURSE ANESTHETIST, CERTIFIED REGISTERED

## 2023-10-16 PROCEDURE — 258N000003 HC RX IP 258 OP 636: Performed by: SURGERY

## 2023-10-16 PROCEDURE — 45384 COLONOSCOPY W/LESION REMOVAL: CPT | Mod: PT | Performed by: SURGERY

## 2023-10-16 PROCEDURE — 250N000009 HC RX 250: Performed by: NURSE ANESTHETIST, CERTIFIED REGISTERED

## 2023-10-16 PROCEDURE — 45384 COLONOSCOPY W/LESION REMOVAL: CPT | Performed by: NURSE ANESTHETIST, CERTIFIED REGISTERED

## 2023-10-16 PROCEDURE — 999N000010 HC STATISTIC ANES STAT CODE-CRNA PER MINUTE: Performed by: SURGERY

## 2023-10-16 PROCEDURE — 99100 ANES PT EXTEME AGE<1 YR&>70: CPT | Performed by: NURSE ANESTHETIST, CERTIFIED REGISTERED

## 2023-10-16 PROCEDURE — 45380 COLONOSCOPY AND BIOPSY: CPT | Performed by: SURGERY

## 2023-10-16 PROCEDURE — 88305 TISSUE EXAM BY PATHOLOGIST: CPT

## 2023-10-16 RX ORDER — SODIUM CHLORIDE, SODIUM LACTATE, POTASSIUM CHLORIDE, CALCIUM CHLORIDE 600; 310; 30; 20 MG/100ML; MG/100ML; MG/100ML; MG/100ML
INJECTION, SOLUTION INTRAVENOUS CONTINUOUS
Status: DISCONTINUED | OUTPATIENT
Start: 2023-10-16 | End: 2023-10-16 | Stop reason: HOSPADM

## 2023-10-16 RX ORDER — NALOXONE HYDROCHLORIDE 0.4 MG/ML
0.2 INJECTION, SOLUTION INTRAMUSCULAR; INTRAVENOUS; SUBCUTANEOUS
Status: DISCONTINUED | OUTPATIENT
Start: 2023-10-16 | End: 2023-10-16 | Stop reason: HOSPADM

## 2023-10-16 RX ORDER — ONDANSETRON 2 MG/ML
4 INJECTION INTRAMUSCULAR; INTRAVENOUS
Status: DISCONTINUED | OUTPATIENT
Start: 2023-10-16 | End: 2023-10-16 | Stop reason: HOSPADM

## 2023-10-16 RX ORDER — NALOXONE HYDROCHLORIDE 0.4 MG/ML
0.4 INJECTION, SOLUTION INTRAMUSCULAR; INTRAVENOUS; SUBCUTANEOUS
Status: DISCONTINUED | OUTPATIENT
Start: 2023-10-16 | End: 2023-10-16 | Stop reason: HOSPADM

## 2023-10-16 RX ORDER — PROPOFOL 10 MG/ML
INJECTION, EMULSION INTRAVENOUS CONTINUOUS PRN
Status: DISCONTINUED | OUTPATIENT
Start: 2023-10-16 | End: 2023-10-16

## 2023-10-16 RX ORDER — PROPOFOL 10 MG/ML
INJECTION, EMULSION INTRAVENOUS PRN
Status: DISCONTINUED | OUTPATIENT
Start: 2023-10-16 | End: 2023-10-16

## 2023-10-16 RX ORDER — FLUMAZENIL 0.1 MG/ML
0.2 INJECTION, SOLUTION INTRAVENOUS
Status: DISCONTINUED | OUTPATIENT
Start: 2023-10-16 | End: 2023-10-16 | Stop reason: HOSPADM

## 2023-10-16 RX ORDER — LIDOCAINE HYDROCHLORIDE 20 MG/ML
INJECTION, SOLUTION INFILTRATION; PERINEURAL PRN
Status: DISCONTINUED | OUTPATIENT
Start: 2023-10-16 | End: 2023-10-16

## 2023-10-16 RX ORDER — LIDOCAINE 40 MG/G
CREAM TOPICAL
Status: DISCONTINUED | OUTPATIENT
Start: 2023-10-16 | End: 2023-10-16 | Stop reason: HOSPADM

## 2023-10-16 RX ADMIN — PROPOFOL 140 MCG/KG/MIN: 10 INJECTION, EMULSION INTRAVENOUS at 13:37

## 2023-10-16 RX ADMIN — PROPOFOL 20 MG: 10 INJECTION, EMULSION INTRAVENOUS at 14:05

## 2023-10-16 RX ADMIN — SODIUM CHLORIDE, POTASSIUM CHLORIDE, SODIUM LACTATE AND CALCIUM CHLORIDE: 600; 310; 30; 20 INJECTION, SOLUTION INTRAVENOUS at 12:23

## 2023-10-16 RX ADMIN — SODIUM CHLORIDE, POTASSIUM CHLORIDE, SODIUM LACTATE AND CALCIUM CHLORIDE: 600; 310; 30; 20 INJECTION, SOLUTION INTRAVENOUS at 13:30

## 2023-10-16 RX ADMIN — LIDOCAINE HYDROCHLORIDE 60 MG: 20 INJECTION, SOLUTION INFILTRATION; PERINEURAL at 13:37

## 2023-10-16 RX ADMIN — PROPOFOL 80 MG: 10 INJECTION, EMULSION INTRAVENOUS at 13:37

## 2023-10-16 ASSESSMENT — ACTIVITIES OF DAILY LIVING (ADL)
ADLS_ACUITY_SCORE: 35

## 2023-10-16 ASSESSMENT — LIFESTYLE VARIABLES: TOBACCO_USE: 1

## 2023-10-16 ASSESSMENT — COPD QUESTIONNAIRES
COPD: 1
CAT_SEVERITY: MILD

## 2023-10-16 NOTE — ANESTHESIA PREPROCEDURE EVALUATION
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 Vitale     ARTHROTOMY WRIST      ,L radius/ulna fracture, s/p plating     COLONOSCOPY  2009    follow up 10 years, 19     colonoscopy  2019    large >1cm sessile adenoma, f/u 1 yr,      COLONOSCOPY N/A 2020    3 tubular adenomas, 1 sessile, follow up 3 years, 2023     OPEN REDUCTION INTERNAL FIXATION WRIST Right 2023    Procedure: OPEN REDUCTION INTERNAL FIXATION, FRACTURE, WRIST;  Surgeon: Manjit Cote MD;  Location: [facility]      Allergies   Allergen Reactions     Aspirin Other (See Comments)     -- Refuses as of 2017  Other reaction(s): Other - Describe In Comment Field  -- Refuses as of 2017     Statins Other (See Comments)     -- Refuses as of 2017  Other reaction(s): Other - Describe In Comment Field  -- Refuses as of 2017     Tetracycline GI Disturbance      Social History     Tobacco Use     Smoking status: Former     Smokeless tobacco: Never     Tobacco comments:     \"quit about 30 years ago\"   Substance Use Topics     Alcohol use: Yes     Comment: 3 wine or beer daily      Wt Readings from Last 1 Encounters:   23 66 kg (145 lb 9.6 oz)        Anesthesia Evaluation   Pt has had prior anesthetic. Type: MAC and General.        ROS/MED HX  ENT/Pulmonary:     (+)                tobacco use,        mild,  COPD,              Neurologic: Comment: " "BPPV  Cerebrovascular small vessel disease      Cardiovascular:     (+) Dyslipidemia - -   -  - -             fainting (syncope).                    Previous cardiac testing     METS/Exercise Tolerance: 3 - Able to walk 1-2 blocks without stopping    Hematologic:       Musculoskeletal: Comment: Distal radius fracture  (+)  arthritis,             GI/Hepatic:       Renal/Genitourinary:       Endo:       Psychiatric/Substance Use:       Infectious Disease:       Malignancy:   (+) Malignancy, History of Other.Other CA base of tongue in 2006 Remission status post Radiation.    Other:          Physical Exam    Airway  airway exam normal      Mallampati: II   TM distance: > 3 FB   Neck ROM: full   Mouth opening: > 3 cm    Respiratory Devices and Support         Dental       (+) Edentulous      Cardiovascular   cardiovascular exam normal       Rhythm and rate: regular and normal     Pulmonary   pulmonary exam normal        breath sounds clear to auscultation       OUTSIDE LABS:  CBC:   Lab Results   Component Value Date    WBC 2.7 (L) 06/18/2023    WBC 4.7 08/01/2022    HGB 13.8 06/18/2023    HGB 13.2 08/01/2022    HCT 40.0 06/18/2023    HCT 39.1 08/01/2022     (L) 06/18/2023     08/01/2022     BMP:   Lab Results   Component Value Date     (L) 06/22/2023     (L) 06/18/2023    POTASSIUM 4.1 06/22/2023    POTASSIUM 4.4 06/18/2023    CHLORIDE 97 (L) 06/22/2023    CHLORIDE 96 (L) 06/18/2023    CO2 27 06/22/2023    CO2 25 06/18/2023    BUN 13.8 06/22/2023    BUN 12.2 06/18/2023    CR 0.65 09/25/2023    CR 0.66 06/22/2023    GLC 96 06/22/2023    GLC 90 06/18/2023     COAGS: No results found for: \"PTT\", \"INR\", \"FIBR\"  POC: No results found for: \"BGM\", \"HCG\", \"HCGS\"  HEPATIC:   Lab Results   Component Value Date    ALBUMIN 4.2 06/22/2023    PROTTOTAL 6.7 06/22/2023    ALT 22 06/22/2023    AST 39 06/22/2023    ALKPHOS 23 (L) 06/22/2023    BILITOTAL 0.4 06/22/2023     OTHER:   Lab Results   Component Value " Date    LACT 0.8 06/18/2023    FARSHAD 10.4 (H) 09/25/2023    TSH 1.00 08/01/2022    CRP 4.7 08/01/2022    SED 14 08/01/2022       Anesthesia Plan    ASA Status:  2    NPO Status:  NPO Appropriate    Anesthesia Type: MAC.     - Reason for MAC: straight local not clinically adequate   Induction: Intravenous.   Maintenance: Balanced.        Consents    Anesthesia Plan(s) and associated risks, benefits, and realistic alternatives discussed. Questions answered and patient/representative(s) expressed understanding.     - Discussed: Risks, Benefits and Alternatives for BOTH SEDATION and the PROCEDURE were discussed     - Discussed with:  Patient            Postoperative Care            Comments:    Other Comments: Risks, benefits and alternatives discussed and would like to proceed. General anesthesia ok if indicated.               AARON Bonilla CRNA

## 2023-10-16 NOTE — H&P
History and Physical    CHIEF COMPLAINT / REASON FOR PROCEDURE:  h/o polyps    PERTINENT HISTORY   Patient is a 78 year old female who presents today for screening colonoscopy for h/o polyps.   Last colonoscopy 2020.   Patient has no complaints.    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 Vitale    ARTHROTOMY WRIST      2011,L radius/ulna fracture, s/p plating    COLONOSCOPY  08/06/2009    follow up 10 years, 8/6/19    colonoscopy  09/11/2019    large >1cm sessile adenoma, f/u 1 yr, 2020    COLONOSCOPY N/A 9/22/2020    3 tubular adenomas, 1 sessile, follow up 3 years, 9/22/2023    OPEN REDUCTION INTERNAL FIXATION WRIST Right 6/23/2023    Procedure: OPEN REDUCTION INTERNAL FIXATION, FRACTURE, WRIST;  Surgeon: Manjit Cote MD;  Location: [facility]       Bleeding tendencies:  No    ALLERGIES/SENSITIVITIES:   Allergies   Allergen Reactions    Aspirin Other (See Comments)     -- Refuses as of 8/31/2017  Other reaction(s): Other - Describe In Comment Field  -- Refuses as of 8/31/2017    Statins Other (See Comments)     -- Refuses as of 8/31/2017  Other reaction(s): Other - Describe In Comment Field  -- Refuses as of 8/31/2017    Tetracycline GI Disturbance        CURRENT MEDICATIONS:    Prior to Admission medications    Medication Sig Start Date End Date Taking? Authorizing Provider   bisacodyl (DULCOLAX) 5 MG EC tablet Take as directed by colonoscopy prep instructions 10/9/23  Yes Dawson Duran MD   calcium-magnesium (CALMAG) 500-250 MG TABS per tablet Take 1 tablet by mouth 2 times daily   Yes Reported, Patient  "  Cholecalciferol (VITAMIN D) 2000 units CAPS Take 1 capsule by mouth daily   Yes Reported, Patient   ELDERBERRY PO Elderberry juice   Yes Reported, Patient       Physical Exam:  BP (!) 176/92 (Cuff Size: Adult Small)   Pulse 65   Temp 98.3  F (36.8  C) (Tympanic)   Resp 16   Ht 1.626 m (5' 4\")   Wt 62.1 kg (137 lb)   LMP 08/01/1994 (Approximate)   SpO2 96%   BMI 23.52 kg/m    EXAM:  Chest/Respiratory Exam: Normal - Clear to auscultation without rales, rhonchi, or wheezing.  Cardiovascular Exam: normal, regular rate and rhythm        PLAN: COLONOSCOPY .  Patient understands risks of bleeding, perforation, potential inability to reach cecum, aspiration and wishes to proceed. MAC needed for age.      "

## 2023-10-16 NOTE — ANESTHESIA POSTPROCEDURE EVALUATION
Patient: Yanci Luther    Procedure: Procedure(s):  COLONOSCOPY, WITH POLYPECTOMY       Anesthesia Type:  MAC    Note:  Disposition: Outpatient   Postop Pain Control: Uneventful            Sign Out: Well controlled pain   PONV: No   Neuro/Psych: Uneventful            Sign Out: Acceptable/Baseline neuro status   Airway/Respiratory: Uneventful            Sign Out: Acceptable/Baseline resp. status   CV/Hemodynamics: Uneventful            Sign Out: Acceptable CV status; No obvious hypovolemia; No obvious fluid overload   Other NRE: NONE   DID A NON-ROUTINE EVENT OCCUR? No       Last vitals:  Vitals Value Taken Time   /97 10/16/23 1430   Temp 97.5  F (36.4  C) 10/16/23 1422   Pulse 61 10/16/23 1430   Resp 16 10/16/23 1422   SpO2 96 % 10/16/23 1444   Vitals shown include unfiled device data.    Electronically Signed By: AARON Bonilla CRNA  October 16, 2023  2:50 PM

## 2023-10-16 NOTE — OP NOTE
PROCEDURE NOTE    DATE OF SERVICE: 10/16/2023    SURGEON: Dawson Duran MD    PRE-OP DIAGNOSIS:    Screening  History of Polyps        POST-OP DIAGNOSIS:  Same  Sigmoid Diverticulosis  Polyps at HF, 40 cm, 20 cm, and 10 cm    PROCEDURE:   Colonoscopy with hot biopsy      ANESTHESIA:  MARIA DEL CARMEN Lal CRNAs/SUNNY Quinonez CRNA    INDICATION FOR THE PROCEDURE: The patient is a 78 year old female with h/o polyps . The patient has no other complaints  . After explaining the risks to include bleeding, perforation, potential inability toreach the cecum, the patient wished to proceed.    PROCEDURE:After adequate sedation, the patient was in the left lateral decubitus position.  Rectal exam was performed.  There was normal tone and no palpable masses .  The colonoscope was introduced into the rectum and advanced to the cecum with Mild difficulty.  The patient's prep was good.  The terminal cecum was reached.  The cecum, ascending, transverse, descending and sigmoid colon was with sigmoid diverticulosis. Small 0.3 to 0.5 cm polyps at HF, 40 cm, 20 cm and 10 cm were hot biopsied and removed .  The scope was retroflexed in the rectum.  The rectum was unremarkable  .  The scope was straightened and removed.  The patient tolerated the procedure well.     ESTIMATED BLOOD LOSS: none    COMPLICATIONS:  None    TISSUE REMOVED:  Yes    RECOMMEND:      Follow-up pending pathology  Fiber  Given literature on diverticulosis    Dawson Duran MD FACS

## 2023-10-16 NOTE — ANESTHESIA CARE TRANSFER NOTE
Patient: Yanci Luther    Procedure: Procedure(s):  COLONOSCOPY, WITH POLYPECTOMY       Diagnosis: Serrated adenoma of colon [D12.6]  Diagnosis Additional Information: No value filed.    Anesthesia Type:   MAC     Note:    Oropharynx: oropharynx clear of all foreign objects and spontaneously breathing  Level of Consciousness: awake and drowsy  Oxygen Supplementation: room air    Independent Airway: airway patency satisfactory and stable  Dentition: dentition unchanged  Vital Signs Stable: post-procedure vital signs reviewed and stable  Report to RN Given: handoff report given  Patient transferred to: Phase II          Vitals:  Vitals Value Taken Time   BP     Temp     Pulse     Resp     SpO2         Electronically Signed By: AARON Sprague CRNA  October 16, 2023  2:23 PM

## 2023-10-17 ENCOUNTER — HOSPITAL ENCOUNTER (OUTPATIENT)
Dept: MAMMOGRAPHY | Facility: OTHER | Age: 78
Discharge: HOME | End: 2023-10-17
Attending: FAMILY MEDICINE | Admitting: FAMILY MEDICINE
Payer: COMMERCIAL

## 2023-10-17 DIAGNOSIS — Z12.31 VISIT FOR SCREENING MAMMOGRAM: ICD-10-CM

## 2023-10-17 PROCEDURE — 77067 SCR MAMMO BI INCL CAD: CPT

## 2023-10-20 LAB
PATH REPORT.COMMENTS IMP SPEC: NORMAL
PATH REPORT.FINAL DX SPEC: NORMAL
PATH REPORT.RELEVANT HX SPEC: NORMAL
PHOTO IMAGE: NORMAL

## 2023-10-30 PROBLEM — K63.5 COLON POLYPS: Status: RESOLVED | Noted: 2023-10-16 | Resolved: 2023-10-30

## 2024-03-19 ENCOUNTER — APPOINTMENT (RX ONLY)
Dept: URBAN - NONMETROPOLITAN AREA CLINIC 4 | Facility: CLINIC | Age: 79
Setting detail: DERMATOLOGY
End: 2024-03-19

## 2024-03-19 DIAGNOSIS — L72.8 OTHER FOLLICULAR CYSTS OF THE SKIN AND SUBCUTANEOUS TISSUE: ICD-10-CM

## 2024-03-19 DIAGNOSIS — L57.0 ACTINIC KERATOSIS: ICD-10-CM

## 2024-03-19 DIAGNOSIS — D485 NEOPLASM OF UNCERTAIN BEHAVIOR OF SKIN: ICD-10-CM

## 2024-03-19 DIAGNOSIS — L81.4 OTHER MELANIN HYPERPIGMENTATION: ICD-10-CM

## 2024-03-19 DIAGNOSIS — B07.8 OTHER VIRAL WARTS: ICD-10-CM

## 2024-03-19 DIAGNOSIS — D18.0 HEMANGIOMA: ICD-10-CM

## 2024-03-19 DIAGNOSIS — D22 MELANOCYTIC NEVI: ICD-10-CM

## 2024-03-19 PROBLEM — D48.5 NEOPLASM OF UNCERTAIN BEHAVIOR OF SKIN: Status: ACTIVE | Noted: 2024-03-19

## 2024-03-19 PROBLEM — D22.9 MELANOCYTIC NEVI, UNSPECIFIED: Status: ACTIVE | Noted: 2024-03-19

## 2024-03-19 PROBLEM — D18.01 HEMANGIOMA OF SKIN AND SUBCUTANEOUS TISSUE: Status: ACTIVE | Noted: 2024-03-19

## 2024-03-19 PROCEDURE — ? LIQUID NITROGEN

## 2024-03-19 PROCEDURE — ? SHAVE REMOVAL

## 2024-03-19 PROCEDURE — 17110 DESTRUCTION B9 LES UP TO 14: CPT | Mod: 59

## 2024-03-19 PROCEDURE — ? COUNSELING

## 2024-03-19 PROCEDURE — 17000 DESTRUCT PREMALG LESION: CPT | Mod: 59

## 2024-03-19 PROCEDURE — ? DEFER

## 2024-03-19 PROCEDURE — 11302 SHAVE SKIN LESION 1.1-2.0 CM: CPT

## 2024-03-19 PROCEDURE — 11301 SHAVE SKIN LESION 0.6-1.0 CM: CPT

## 2024-03-19 PROCEDURE — 99213 OFFICE O/P EST LOW 20 MIN: CPT | Mod: 25

## 2024-03-19 PROCEDURE — 17003 DESTRUCT PREMALG LES 2-14: CPT | Mod: 59

## 2024-03-19 ASSESSMENT — LOCATION SIMPLE DESCRIPTION DERM
LOCATION SIMPLE: LEFT INDEX FINGER
LOCATION SIMPLE: SCALP
LOCATION SIMPLE: LEFT UPPER ARM
LOCATION SIMPLE: LEFT MIDDLE FINGER
LOCATION SIMPLE: LEFT UPPER BACK
LOCATION SIMPLE: ABDOMEN
LOCATION SIMPLE: LEFT FOREARM

## 2024-03-19 ASSESSMENT — LOCATION DETAILED DESCRIPTION DERM
LOCATION DETAILED: LEFT INFERIOR LATERAL UPPER BACK
LOCATION DETAILED: LEFT DISTAL PALMAR MIDDLE FINGER
LOCATION DETAILED: RIGHT INFERIOR FRONTAL SCALP
LOCATION DETAILED: LEFT DISTAL PALMAR INDEX FINGER
LOCATION DETAILED: LEFT ANTERIOR PROXIMAL UPPER ARM
LOCATION DETAILED: RIGHT RIB CAGE
LOCATION DETAILED: LEFT RIB CAGE
LOCATION DETAILED: LEFT VENTRAL DISTAL FOREARM
LOCATION DETAILED: LEFT INDEX FINGERTIP
LOCATION DETAILED: RIGHT LATERAL ABDOMEN

## 2024-03-19 ASSESSMENT — LOCATION ZONE DERM
LOCATION ZONE: SCALP
LOCATION ZONE: TRUNK
LOCATION ZONE: FINGER
LOCATION ZONE: ARM

## 2024-04-02 ENCOUNTER — APPOINTMENT (RX ONLY)
Dept: URBAN - NONMETROPOLITAN AREA CLINIC 4 | Facility: CLINIC | Age: 79
Setting detail: DERMATOLOGY
End: 2024-04-02

## 2024-04-02 DIAGNOSIS — L72.8 OTHER FOLLICULAR CYSTS OF THE SKIN AND SUBCUTANEOUS TISSUE: ICD-10-CM

## 2024-04-02 DIAGNOSIS — B07.8 OTHER VIRAL WARTS: ICD-10-CM

## 2024-04-02 DIAGNOSIS — L82.0 INFLAMED SEBORRHEIC KERATOSIS: ICD-10-CM

## 2024-04-02 PROCEDURE — ? COUNSELING

## 2024-04-02 PROCEDURE — 99213 OFFICE O/P EST LOW 20 MIN: CPT | Mod: 25

## 2024-04-02 PROCEDURE — 17110 DESTRUCTION B9 LES UP TO 14: CPT

## 2024-04-02 PROCEDURE — ? INTRALESIONAL KENALOG

## 2024-04-02 PROCEDURE — ? LIQUID NITROGEN

## 2024-04-02 PROCEDURE — 11900 INJECT SKIN LESIONS </W 7: CPT | Mod: 59

## 2024-04-02 PROCEDURE — ? PATHOLOGY DISCUSSION

## 2024-04-02 ASSESSMENT — LOCATION DETAILED DESCRIPTION DERM
LOCATION DETAILED: LEFT DISTAL PALMAR INDEX FINGER
LOCATION DETAILED: LEFT DISTAL PALMAR MIDDLE FINGER
LOCATION DETAILED: LEFT INDEX FINGERTIP
LOCATION DETAILED: LEFT MIDDLE FINGERTIP
LOCATION DETAILED: RIGHT INFERIOR FRONTAL SCALP

## 2024-04-02 ASSESSMENT — LOCATION SIMPLE DESCRIPTION DERM
LOCATION SIMPLE: SCALP
LOCATION SIMPLE: LEFT MIDDLE FINGER
LOCATION SIMPLE: LEFT INDEX FINGER

## 2024-04-02 ASSESSMENT — LOCATION ZONE DERM
LOCATION ZONE: FINGER
LOCATION ZONE: SCALP

## 2024-04-02 ASSESSMENT — TOTAL NUMBER OF VERRUCA VULGARIS: # OF LESIONS?: 4

## 2024-06-18 ENCOUNTER — OFFICE VISIT (OUTPATIENT)
Dept: OTOLARYNGOLOGY | Facility: OTHER | Age: 79
End: 2024-06-18
Attending: OTOLARYNGOLOGY
Payer: COMMERCIAL

## 2024-06-18 DIAGNOSIS — R13.13 PHARYNGEAL DYSPHAGIA: Primary | ICD-10-CM

## 2024-06-18 PROCEDURE — G0463 HOSPITAL OUTPT CLINIC VISIT: HCPCS

## 2024-06-18 NOTE — NURSING NOTE
Patient here to see ENT for Swallowing problems, aspiration of food.   Leola Sampson LPN ..........6/18/2024 10:45 AM

## 2024-06-28 NOTE — PROGRESS NOTES
document embedded image                                        [facility]                                                                                                                                                       Patient Name: Yanci Luther   Address: [address] [address]    YOB: 1945   [place] [address]   MR Number: [id number]   Phone: [phone]  PCP: Janes Gunter MD           Appointment Date: 06/18/24  Visit Provider: Deven Lopez MD    cc: ~    ENT Progress Note        Intake  Visit Reasons: swallowing problems;aspiration of food    HPI  History of Present Illness  Chief complaint:  Dysphagia     History  The patient is an 79-year-old female who presents to the office today with complaints of progressive dysphagia over the last year.  She was no associated weight loss.  She does feel that she occasionally aspirates when trying to eat or drink.  She has not suffered a pneumonia.  She has had to adjust and go to some liquid medications because of difficulty with swallowing pills.  She has had no hemoptysis or hematemesis.  She does not have throat pain.  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  She was treated with radiation therapy alone.    Exam   Oral cavity oropharynx-free of mucosal lesion or inflammation  Neck-the soft tissues of her neck or Woody and firm but there has no discrete palpable mass  Bimanual exam-there is no palpable tonsillar fossa or tongue base masses   Indirect laryngoscopy-the patient gives an excellent exam I can see the full length of her vocal cords.  Her larynx is neurologically intact.  There is no mucosal lesions visible.  There is no pooling in either upper puriform sinus.  Nasal-free of obstruction or purulence  Head and neck integument-Clear  General-the patient appears well and in no distress   Neuro-there are no focal cranial nerve  deficits    Allergies    No Known Allergies Allergy (Verified 06/19/24 11:20)    PFSH  PFSH:     Past Medical History: (Updated 06/19/24 @ 11:21 by Carolin Ariza, Med Assist)    Broken wrist  Cancer      Social History: (Updated 06/19/24 @ 11:21 by Carolin Ariza, Med Assist)  Smoking Status:  Former smoker   second hand exposure:  No   alcohol intake:  current   substance use type:  does not use     Vital Signs      06/19/24  11:20  Height   5 ft 4 in  Height (cm)   162.6  Weight   62.142 kg  Weight (lb)   137 lbs and  0.0  ozs   Weight Measurement Method   Stated by Patient  BMI   23.5  BSA   1.67    A&P  Assessment & Plan  (1) Pharyngeal dysphagia:         Status: Acute        Code(s):  R13.13 - Dysphagia, pharyngeal phase  Given her previous cancer history, we will make arrangements for panendoscopy and possible dilation of stricture.  We certainly need to rule out neoplasm.  She understands this and wishes to proceed.                Deven Lopez MD    Filed: 06/19/24 2320      <Electronically signed by Deven Lopez MD> 06/19/24 4820

## 2024-07-01 ENCOUNTER — TRANSFERRED RECORDS (OUTPATIENT)
Dept: HEALTH INFORMATION MANAGEMENT | Facility: OTHER | Age: 79
End: 2024-07-01
Payer: COMMERCIAL

## 2024-07-15 ENCOUNTER — DOCUMENTATION ONLY (OUTPATIENT)
Dept: FAMILY MEDICINE | Facility: OTHER | Age: 79
End: 2024-07-15

## 2024-07-15 DIAGNOSIS — Z00.00 ENCOUNTER FOR MEDICARE ANNUAL WELLNESS EXAM: Primary | ICD-10-CM

## 2024-07-15 DIAGNOSIS — Z13.220 SCREENING CHOLESTEROL LEVEL: ICD-10-CM

## 2024-07-15 NOTE — PROGRESS NOTES
Pt has a lab appt before her Medicare Annual Exam with you on July 22nd.  Lab orders are pended for your signature.  Please review and add or delete orders per your discretion.  Hailey Kulkarni LPN on 7/15/2024 at 1:40 PM

## 2024-07-21 SDOH — HEALTH STABILITY: PHYSICAL HEALTH: ON AVERAGE, HOW MANY MINUTES DO YOU ENGAGE IN EXERCISE AT THIS LEVEL?: 0 MIN

## 2024-07-21 SDOH — HEALTH STABILITY: PHYSICAL HEALTH: ON AVERAGE, HOW MANY DAYS PER WEEK DO YOU ENGAGE IN MODERATE TO STRENUOUS EXERCISE (LIKE A BRISK WALK)?: 0 DAYS

## 2024-07-21 ASSESSMENT — SOCIAL DETERMINANTS OF HEALTH (SDOH): HOW OFTEN DO YOU GET TOGETHER WITH FRIENDS OR RELATIVES?: MORE THAN THREE TIMES A WEEK

## 2024-07-22 ENCOUNTER — OFFICE VISIT (OUTPATIENT)
Dept: FAMILY MEDICINE | Facility: OTHER | Age: 79
End: 2024-07-22
Attending: FAMILY MEDICINE
Payer: COMMERCIAL

## 2024-07-22 ENCOUNTER — LAB (OUTPATIENT)
Dept: LAB | Facility: OTHER | Age: 79
End: 2024-07-22
Attending: FAMILY MEDICINE
Payer: COMMERCIAL

## 2024-07-22 VITALS
DIASTOLIC BLOOD PRESSURE: 80 MMHG | HEART RATE: 64 BPM | TEMPERATURE: 97.1 F | BODY MASS INDEX: 24.09 KG/M2 | OXYGEN SATURATION: 98 % | HEIGHT: 64 IN | RESPIRATION RATE: 16 BRPM | SYSTOLIC BLOOD PRESSURE: 144 MMHG | WEIGHT: 141.1 LBS

## 2024-07-22 DIAGNOSIS — E78.2 MIXED HYPERLIPIDEMIA: ICD-10-CM

## 2024-07-22 DIAGNOSIS — Z00.00 MEDICARE ANNUAL WELLNESS VISIT, SUBSEQUENT: Primary | ICD-10-CM

## 2024-07-22 DIAGNOSIS — Z00.00 ENCOUNTER FOR MEDICARE ANNUAL WELLNESS EXAM: ICD-10-CM

## 2024-07-22 DIAGNOSIS — Z13.220 SCREENING CHOLESTEROL LEVEL: ICD-10-CM

## 2024-07-22 DIAGNOSIS — M81.0 SENILE OSTEOPOROSIS: ICD-10-CM

## 2024-07-22 DIAGNOSIS — M25.552 HIP PAIN, LEFT: ICD-10-CM

## 2024-07-22 DIAGNOSIS — J44.9 OBSTRUCTIVE LUNG DISEASE (H): ICD-10-CM

## 2024-07-22 LAB
ALBUMIN SERPL BCG-MCNC: 4.5 G/DL (ref 3.5–5.2)
ALP SERPL-CCNC: 23 U/L (ref 40–150)
ALT SERPL W P-5'-P-CCNC: 10 U/L (ref 0–50)
ANION GAP SERPL CALCULATED.3IONS-SCNC: 9 MMOL/L (ref 7–15)
AST SERPL W P-5'-P-CCNC: 20 U/L (ref 0–45)
BASOPHILS # BLD AUTO: 0 10E3/UL (ref 0–0.2)
BASOPHILS NFR BLD AUTO: 1 %
BILIRUB SERPL-MCNC: 0.7 MG/DL
BUN SERPL-MCNC: 9 MG/DL (ref 8–23)
CALCIUM SERPL-MCNC: 10.3 MG/DL (ref 8.8–10.4)
CHLORIDE SERPL-SCNC: 103 MMOL/L (ref 98–107)
CHOLEST SERPL-MCNC: 261 MG/DL
CREAT SERPL-MCNC: 0.68 MG/DL (ref 0.51–0.95)
EGFRCR SERPLBLD CKD-EPI 2021: 88 ML/MIN/1.73M2
EOSINOPHIL # BLD AUTO: 0.2 10E3/UL (ref 0–0.7)
EOSINOPHIL NFR BLD AUTO: 4 %
ERYTHROCYTE [DISTWIDTH] IN BLOOD BY AUTOMATED COUNT: 13 % (ref 10–15)
FASTING STATUS PATIENT QL REPORTED: YES
FASTING STATUS PATIENT QL REPORTED: YES
GLUCOSE SERPL-MCNC: 94 MG/DL (ref 70–99)
HCO3 SERPL-SCNC: 29 MMOL/L (ref 22–29)
HCT VFR BLD AUTO: 40.4 % (ref 35–47)
HDLC SERPL-MCNC: 116 MG/DL
HGB BLD-MCNC: 13.4 G/DL (ref 11.7–15.7)
IMM GRANULOCYTES # BLD: 0 10E3/UL
IMM GRANULOCYTES NFR BLD: 0 %
LDLC SERPL CALC-MCNC: 129 MG/DL
LYMPHOCYTES # BLD AUTO: 1.6 10E3/UL (ref 0.8–5.3)
LYMPHOCYTES NFR BLD AUTO: 38 %
MCH RBC QN AUTO: 33.2 PG (ref 26.5–33)
MCHC RBC AUTO-ENTMCNC: 33.2 G/DL (ref 31.5–36.5)
MCV RBC AUTO: 100 FL (ref 78–100)
MONOCYTES # BLD AUTO: 0.6 10E3/UL (ref 0–1.3)
MONOCYTES NFR BLD AUTO: 13 %
NEUTROPHILS # BLD AUTO: 1.9 10E3/UL (ref 1.6–8.3)
NEUTROPHILS NFR BLD AUTO: 45 %
NONHDLC SERPL-MCNC: 145 MG/DL
NRBC # BLD AUTO: 0 10E3/UL
NRBC BLD AUTO-RTO: 0 /100
PLATELET # BLD AUTO: 161 10E3/UL (ref 150–450)
POTASSIUM SERPL-SCNC: 4 MMOL/L (ref 3.4–5.3)
PROT SERPL-MCNC: 7.3 G/DL (ref 6.4–8.3)
RBC # BLD AUTO: 4.04 10E6/UL (ref 3.8–5.2)
SODIUM SERPL-SCNC: 141 MMOL/L (ref 135–145)
TRIGL SERPL-MCNC: 78 MG/DL
TSH SERPL DL<=0.005 MIU/L-ACNC: 2.31 UIU/ML (ref 0.3–4.2)
WBC # BLD AUTO: 4.2 10E3/UL (ref 4–11)

## 2024-07-22 PROCEDURE — 84443 ASSAY THYROID STIM HORMONE: CPT | Mod: ZL

## 2024-07-22 PROCEDURE — 99213 OFFICE O/P EST LOW 20 MIN: CPT | Mod: 25 | Performed by: FAMILY MEDICINE

## 2024-07-22 PROCEDURE — 84155 ASSAY OF PROTEIN SERUM: CPT | Mod: ZL

## 2024-07-22 PROCEDURE — 84295 ASSAY OF SERUM SODIUM: CPT | Mod: ZL

## 2024-07-22 PROCEDURE — G0463 HOSPITAL OUTPT CLINIC VISIT: HCPCS

## 2024-07-22 PROCEDURE — 82465 ASSAY BLD/SERUM CHOLESTEROL: CPT | Mod: ZL

## 2024-07-22 PROCEDURE — G0439 PPPS, SUBSEQ VISIT: HCPCS | Performed by: FAMILY MEDICINE

## 2024-07-22 PROCEDURE — 36415 COLL VENOUS BLD VENIPUNCTURE: CPT | Mod: ZL

## 2024-07-22 PROCEDURE — 85025 COMPLETE CBC W/AUTO DIFF WBC: CPT | Mod: ZL

## 2024-07-22 RX ORDER — HEPARIN SODIUM,PORCINE 10 UNIT/ML
5-20 VIAL (ML) INTRAVENOUS DAILY PRN
Status: CANCELLED | OUTPATIENT
Start: 2024-09-26

## 2024-07-22 RX ORDER — METHYLPREDNISOLONE SODIUM SUCCINATE 125 MG/2ML
125 INJECTION, POWDER, LYOPHILIZED, FOR SOLUTION INTRAMUSCULAR; INTRAVENOUS
Status: CANCELLED
Start: 2024-09-26

## 2024-07-22 RX ORDER — ALBUTEROL SULFATE 0.83 MG/ML
2.5 SOLUTION RESPIRATORY (INHALATION)
Status: CANCELLED | OUTPATIENT
Start: 2024-09-26

## 2024-07-22 RX ORDER — ZOLEDRONIC ACID 5 MG/100ML
5 INJECTION, SOLUTION INTRAVENOUS ONCE
Status: CANCELLED
Start: 2024-09-26

## 2024-07-22 RX ORDER — ACETAMINOPHEN 325 MG/1
650 TABLET ORAL
Status: CANCELLED | OUTPATIENT
Start: 2024-09-26

## 2024-07-22 RX ORDER — EPINEPHRINE 1 MG/ML
0.3 INJECTION, SOLUTION, CONCENTRATE INTRAVENOUS EVERY 5 MIN PRN
Status: CANCELLED | OUTPATIENT
Start: 2024-09-26

## 2024-07-22 RX ORDER — DIPHENHYDRAMINE HYDROCHLORIDE 50 MG/ML
50 INJECTION INTRAMUSCULAR; INTRAVENOUS
Status: CANCELLED
Start: 2024-09-26

## 2024-07-22 RX ORDER — ALBUTEROL SULFATE 90 UG/1
1-2 AEROSOL, METERED RESPIRATORY (INHALATION)
Status: CANCELLED
Start: 2024-09-26

## 2024-07-22 RX ORDER — HEPARIN SODIUM (PORCINE) LOCK FLUSH IV SOLN 100 UNIT/ML 100 UNIT/ML
5 SOLUTION INTRAVENOUS
Status: CANCELLED | OUTPATIENT
Start: 2024-09-26

## 2024-07-22 RX ORDER — MEPERIDINE HYDROCHLORIDE 50 MG/ML
25 INJECTION INTRAMUSCULAR; INTRAVENOUS; SUBCUTANEOUS EVERY 30 MIN PRN
Status: CANCELLED | OUTPATIENT
Start: 2024-09-26

## 2024-07-22 ASSESSMENT — PAIN SCALES - GENERAL: PAINLEVEL: NO PAIN (1)

## 2024-07-22 NOTE — NURSING NOTE
Chief Complaint   Patient presents with    Wellness Visit         Medication Reconciliation: complete    Marycruz Grimaldo, LPN

## 2024-07-22 NOTE — PROGRESS NOTES
Preventive Care Visit  [facility] AND \[facility]\""  Janes Gunter MD, Family Medicine  Jul 22, 2024      Assessment & Plan     Medicare annual wellness visit, subsequent  Consider RSV and COVID vaccinations this fall.  Reviewed colon cancer screening which she does not need any longer.  Mammogram later this year.    Obstructive lung disease (H)  Stable    Mixed hyperlipidemia  Reviewed labs, intolerant to statins.  Discussed dietary recommendations.    Hip pain, left  Reviewed previous CT of her pelvis which shows significant bilateral hip arthritis as well as L5-S1 degeneration.  Will refer to physical therapy.  - Physical Therapy  Referral; Future    Senile osteoporosis  Reclast scheduled for September.    Patient has been advised of split billing requirements and indicates understanding: Yes        Counseling  Appropriate preventive services were addressed with this patient via screening, questionnaire, or discussion as appropriate for fall prevention, nutrition, physical activity, Tobacco-use cessation, weight loss and cognition.  Checklist reviewing preventive services available has been given to the patient.  Reviewed patient's diet, addressing concerns and/or questions.   The patient was instructed to see the dentist every 6 months.   The patient reports drinking more than 3 alcoholic drinks per day and/or more than 7 drhnks per week. The patient was counseled and given information about possible harmful effects of excessive alcohol intake.Patient reported safety concerns were addressed today.Addressed any concerns about safety while driving.  The patient was provided with written information regarding signs of hearing loss.     No follow-ups on file.    Tiana Pete is a 79 year old, presenting for the following:  Wellness Visit    She has a history of osteoporosis and is recovering from a left forearm fracture a year ago.  She receives Reclast injections on a yearly basis which she has  tolerated.    Has a history of elevated cholesterol and she declines statins as she has been intolerant in the past.    She has been having worsening left hip pain.  No particular trauma or use activity.  This seems to bother her most with prolonged walking, standing and getting out of chairs.        7/22/2024     2:59 PM   Additional Questions   Roomed by Marycruz Grimaldo         Health Care Directive  Patient has a Health Care Directive on file  Advance care planning document is on file and is current.    HPI        7/21/2024   General Health   How would you rate your overall physical health? (!) FAIR   Feel stress (tense, anxious, or unable to sleep) Not at all            7/21/2024   Nutrition   Diet: Low fat/cholesterol    Gluten-free/reduced       Multiple values from one day are sorted in reverse-chronological order         7/21/2024   Exercise   Days per week of moderate/strenous exercise 0 days   Average minutes spent exercising at this level 0 min      (!) EXERCISE CONCERN      7/21/2024   Social Factors   Frequency of gathering with friends or relatives More than three times a week   Worry food won't last until get money to buy more No   Food not last or not have enough money for food? No   Do you have housing? (Housing is defined as stable permanent housing and does not include staying ouside in a car, in a tent, in an abandoned building, in an overnight shelter, or couch-surfing.) Yes   Are you worried about losing your housing? No   Lack of transportation? No   Unable to get utilities (heat,electricity)? No            7/22/2024   Fall Risk   Gait Speed Test (Document in seconds) 8   Gait Speed Test Interpretation Greater than 5.01 seconds - ABNORMAL             7/21/2024   Activities of Daily Living- Home Safety   Needs help with the following daily activites None of the above   Safety concerns in the home Throw rugs in the hallway            7/21/2024   Dental   Dentist two times every year? (!) NO             7/21/2024   Hearing Screening   Hearing concerns? (!) I NEED TO ASK PEOPLE TO SPEAK UP OR REPEAT THEMSELVES.    (!) TROUBLE FOLLOWING DIALOGUE IN THE THEATHER.       Multiple values from one day are sorted in reverse-chronological order         7/21/2024   Driving Risk Screening   Patient/family members have concerns about driving (!) YES             7/21/2024   General Alertness/Fatigue Screening   Have you been more tired than usual lately? No            7/21/2024   Urinary Incontinence Screening   Bothered by leaking urine in past 6 months No            7/21/2024   TB Screening   Were you born outside of the [place]? Yes            Today's PHQ-2 Score:       7/21/2024    10:33 AM   PHQ-2 ( 1999 [employer])   Q1: Little interest or pleasure in doing things 0   Q2: Feeling down, depressed or hopeless 0   PHQ-2 Score 0   Q1: Little interest or pleasure in doing things Not at all   Q2: Feeling down, depressed or hopeless Not at all   PHQ-2 Score 0           7/21/2024   Substance Use   Alcohol more than 3/day or more than 7/wk Yes   How often do you have a drink containing alcohol 4 or more times a week   How many alcohol drinks on typical day 3 or 4   How often do you have 5+ drinks at one occasion Monthly   Audit 2/3 Score 3   How often not able to stop drinking once started Never   How often failed to do what normally expected Never   How often needed first drink in am after a heavy drinking session Never   How often feeling of guilt or remorse after drinking Never   How often unable to remember what happened the night before Never   Have you or someone else been injured because of your drinking No   Has anyone been concerned or suggested you cut down on drinking No   TOTAL SCORE - AUDIT 7   Do you have a current opioid prescription? No   How severe/bad is pain from 1 to 10? 3/10   Do you use any other substances recreationally? No        Social History     Tobacco Use    Smoking status: Former    Smokeless tobacco:  "Never    Tobacco comments:     \"quit about 30 years ago\"   Vaping Use    Vaping status: Never Used   Substance Use Topics    Alcohol use: Yes     Comment: 3 wine or beer daily    Drug use: Never           10/6/2022   LAST FHS-7 RESULTS   1st degree relative breast or ovarian cancer No   Any relative bilateral breast cancer No   Any male have breast cancer No   Any ONE woman have BOTH breast AND ovarian cancer No   Any woman with breast cancer before 50yrs No   2 or more relatives with breast AND/OR ovarian cancer No   2 or more relatives with breast AND/OR bowel cancer No                   Reviewed and updated as needed this visit by Provider                      Current providers sharing in care for this patient include:  Patient Care Team:  Janes Gunter MD as PCP - General (Family Practice)  Janes Gunter MD as Assigned PCP  Aditi Roy MD as Assigned Neuroscience Provider  Manjit Cote MD as Assigned Musculoskeletal Provider    The following health maintenance items are reviewed in Epic and correct as of today:  Health Maintenance   Topic Date Due    SPIROMETRY  Never done    COPD ACTION PLAN  Never done    DTAP/TDAP/TD IMMUNIZATION (1 - Tdap) Never done    RSV VACCINE (Pregnancy & 60+) (1 - 1-dose 60+ series) Never done    ZOSTER IMMUNIZATION (2 of 2) 11/13/2020    COVID-19 Vaccine (1 - 2023-24 season) Never done    INFLUENZA VACCINE (1) 09/01/2024    MEDICARE ANNUAL WELLNESS VISIT  07/22/2025    LIPID  07/22/2025    FALL RISK ASSESSMENT  07/22/2025    GLUCOSE  07/22/2027    ADVANCE CARE PLANNING  06/26/2028    DEXA  10/06/2037    PHQ-2 (once per calendar year)  Completed    Pneumococcal Vaccine: 65+ Years  Completed    IPV IMMUNIZATION  Aged Out    HPV IMMUNIZATION  Aged Out    MENINGITIS IMMUNIZATION  Aged Out    RSV MONOCLONAL ANTIBODY  Aged Out    MAMMO SCREENING  Discontinued    COLORECTAL CANCER SCREENING  Discontinued        Objective    Exam  BP (!) 144/80   Pulse 64   Temp 97.1 " " F (36.2  C) (Tympanic)   Resp 16   Ht 1.626 m (5' 4\")   Wt 64 kg (141 lb 1.6 oz)   LMP 08/01/1994 (Approximate)   SpO2 98%   Breastfeeding No   BMI 24.22 kg/m     Estimated body mass index is 24.22 kg/m  as calculated from the following:    Height as of this encounter: 1.626 m (5' 4\").    Weight as of this encounter: 64 kg (141 lb 1.6 oz).    Physical Exam  GENERAL: alert and no distress  EYES: Eyes grossly normal to inspection, PERRL and conjunctivae and sclerae normal  HENT: ear canals and TM's normal, nose and mouth without ulcers or lesions  RESP: lungs clear to auscultation - no rales, rhonchi or wheezes  CV: regular rate and rhythm, normal S1 S2, no S3 or S4, no murmur, click or rub, no peripheral edema   NEURO: Normal strength and tone, mentation intact and speech normal  PSYCH: mentation appears normal, affect normal/bright        7/22/2024   Mini Cog   Clock Draw Score 2 Normal   3 Item Recall 3 objects recalled   Mini Cog Total Score 5                 Signed Electronically by: Janes Gunter MD    "

## 2024-09-25 ENCOUNTER — THERAPY VISIT (OUTPATIENT)
Dept: PHYSICAL THERAPY | Facility: OTHER | Age: 79
End: 2024-09-25
Attending: FAMILY MEDICINE
Payer: COMMERCIAL

## 2024-09-25 DIAGNOSIS — M25.552 HIP PAIN, LEFT: ICD-10-CM

## 2024-09-25 PROCEDURE — 97161 PT EVAL LOW COMPLEX 20 MIN: CPT | Mod: GP,PN

## 2024-09-25 PROCEDURE — 97140 MANUAL THERAPY 1/> REGIONS: CPT | Mod: GP,PN

## 2024-09-25 PROCEDURE — 97110 THERAPEUTIC EXERCISES: CPT | Mod: GP,PN

## 2024-09-25 NOTE — PROGRESS NOTES
PHYSICAL THERAPY EVALUATION  Type of Visit: Evaluation        Fall Risk Screen:  Fall screen completed by: PT  Have you fallen 2 or more times in the past year?: No  Have you fallen and had an injury in the past year?: No  Is patient a fall risk?: No    Subjective   Patient reports that she experienced an insidious onset of L posterior hip pain 6 months ago that is most noticeable with getting into bed (resides in pop up truck camper with bed over cab) so accesses bed with a swing over of L LE. Large amount of pain with this. Also notes pain is limiting walking performance. Has had to decrease duration. Patient also regularly performs a daily exercise program and has to avoid some of the yoga like movements due to pain.     Went to primary care and noted significant OA in bilateral hips. Prior to onset of posterior L hip pain, she was experiencing intermittent groin and inner thigh pain with prolonged walking.     Is not able to lie on Left for sleep.     Presenting condition or subjective complaint:    Date of onset: 03/01/24    Relevant medical history:   BPPV, cerebrovascular smaller small vessel disease, OA, osteoporosis  Dates & types of surgery:      Prior diagnostic imaging/testing results:       Study Result    Narrative & Impression   PROCEDURE: CT PELVIS BONE WO CONTRAST 10/14/2022 2:31 PM     HISTORY: Chronic groin pain, left; Chronic groin pain, left     COMPARISONS: None.     Meds/Dose Given: None.     TECHNIQUE: Axial noncontrast enhanced images with coronal and sagittal  images.     FINDINGS: No fracture is seen. There is no suspicious bone lesion.  Lesion in the right ilium adjacent to the sacroiliac joint is  consistent with a bone island.     There is severe degenerative change in lower lumbar facet joints with  degenerative disc disease most severe at the L5-S1 level. There is  grade 1 anterolisthesis of L5 on S1. There is bilateral degenerative  change in the hips as well.     There is no pelvic  mass or fluid collection. No hernia is seen. No  significant inguinal lymph node enlargement is seen. There is no soft  tissue mass.                                                                        IMPRESSION: Degenerative changes in the spine and hips.     No acute bone or soft tissue abnormality.     ILYA ZAMARRIPA MD      Prior therapy history for the same diagnosis, illness or injury:        Prior Level of Function  Transfers: Independent  Ambulation: Independent  ADL: Independent  IADL: Yard work, hking, travel    Living Environment  Social support:   spouse  Type of home:   pop up truck camper  Stairs to enter the home:         Ramp:     Stairs inside the home:       0  Help at home:    Equipment owned:       Employment:    Retired  Hobbies/Interests:  travel, exercise    Patient goals for therapy:  resolve pain or get back to baseline activity tolerance    Pain assessment:   0-6/10  Sharp pain. Constantly present. Increased with walking, certain positions. Improved with rest, changing positions, certain positions like sitting and lying down.      Objective     Hip ROM: L extension WFL, L hip flexion WFL, hip ER minor limitation, hip IR major limitation with pain reproduction.   R hip extension WFL, flexion WFL, ER minor limitation, IR minor limitation.     Strength: good glut activation, 5/5 for all LE muscle groups. Good and active core engagement with dynamic movements during exercise screen.     Lumbar ROM screen: extension minor limitation, R and L side glide with pain on L, flexion WFL.     Palpation: tension and pain at L piriformis and L glut med with reproduction of posterior hip pain symptoms.     Squat assessment and single leg stance. Good performance without significant difference between LEs and no compensation pattern noted.     Gait: normal inderjit, normal stride length.     Assessment & Plan   CLINICAL IMPRESSIONS  Medical Diagnosis: Hip pain, left (M25.552)    Treatment Diagnosis:  Left hip pain   Impression/Assessment: Patient is a 79 year old female with L hip pain complaints.  The following significant findings have been identified: Pain, Decreased ROM/flexibility, Decreased joint mobility, and Impaired gait. These impairments interfere with their ability to perform recreational activities, household chores, household mobility, community mobility, and bed mobility  as compared to previous level of function.     Clinical Decision Making (Complexity):  Clinical Presentation: Stable/Uncomplicated  Clinical Presentation Rationale: based on medical and personal factors listed in PT evaluation  Clinical Decision Making (Complexity): Low complexity    PLAN OF CARE  Treatment Interventions:  Modalities: Cryotherapy, Hot Pack  Interventions: Manual Therapy, Therapeutic Activity, Therapeutic Exercise, Self-Care/Home Management, Aquatic Therapy    Long Term Goals     PT Goal 1  Goal Identifier: STG 1 - HEP  Goal Description: Patient will be independent and compliant with HEP.  Target Date: 11/23/24  PT Goal 2  Goal Identifier: LTG 1 - Bed Mobility  Goal Description: Patient will be able to perform transfers to bed without limitation from pain in L hip.  Rationale: to maximize safety and independence with performance of ADLs and functional tasks;to maximize safety and independence within the home;to maximize safety and independence within the community  Target Date: 11/23/24  PT Goal 3  Goal Identifier: LTG 2 - Prolonged Walking  Goal Description: Patient will be able to perform 30 minutes of continuous ambulation without limitation from L hip pain.  Rationale: to maximize safety and independence with performance of ADLs and functional tasks;to maximize safety and independence within the home;to maximize safety and independence within the community  Target Date: 11/23/24      Frequency of Treatment: 1-2x/week  Duration of Treatment: 60 days    Recommended Referrals to Other Professionals: Physical  Therapy  Education Assessment:   Learner/Method: Patient;Significant Other  Education Comments: HEP, POC, findings    Risks and benefits of evaluation/treatment have been explained.   Patient/Family/caregiver agrees with Plan of Care.     Evaluation Time:     PT Eval, Low Complexity Minutes (87879): 24       Signing Clinician: GAIL Morales [facility]                                                                                   OUTPATIENT PHYSICAL THERAPY      PLAN OF TREATMENT FOR OUTPATIENT REHABILITATION   Patient's Last Name, First Name, Yanci Zabala YOB: 1945   Provider's Name   [facility]   Medical Record No.  [id number]     Onset Date: 03/01/24  Start of Care Date: 09/25/24     Medical Diagnosis:  Hip pain, left (M25.552)      PT Treatment Diagnosis:  Left hip pain Plan of Treatment  Frequency/Duration: 1-2x/week/ 60 days    Certification date from 09/25/24 to 11/23/24         See note for plan of treatment details and functional goals     Em James, PT                         I CERTIFY THE NEED FOR THESE SERVICES FURNISHED UNDER        THIS PLAN OF TREATMENT AND WHILE UNDER MY CARE .             Physician Signature               Date    X_____________________________________________________                  Referring Provider:  Janes Gunter    Initial Assessment  See Epic Evaluation- Start of Care Date: 09/25/24

## 2024-09-27 ENCOUNTER — APPOINTMENT (OUTPATIENT)
Dept: LAB | Facility: OTHER | Age: 79
End: 2024-09-27
Payer: COMMERCIAL

## 2024-09-27 ENCOUNTER — HOSPITAL ENCOUNTER (OUTPATIENT)
Dept: INFUSION THERAPY | Facility: OTHER | Age: 79
Discharge: HOME | End: 2024-09-27
Attending: FAMILY MEDICINE
Payer: COMMERCIAL

## 2024-09-27 VITALS
TEMPERATURE: 97 F | SYSTOLIC BLOOD PRESSURE: 133 MMHG | HEART RATE: 68 BPM | HEIGHT: 64 IN | DIASTOLIC BLOOD PRESSURE: 71 MMHG | BODY MASS INDEX: 24.21 KG/M2 | WEIGHT: 141.8 LBS | RESPIRATION RATE: 18 BRPM

## 2024-09-27 DIAGNOSIS — M81.0 SENILE OSTEOPOROSIS: Primary | ICD-10-CM

## 2024-09-27 LAB
ALBUMIN SERPL BCG-MCNC: 4.4 G/DL (ref 3.5–5.2)
CALCIUM SERPL-MCNC: 10.2 MG/DL (ref 8.8–10.4)
CREAT SERPL-MCNC: 0.74 MG/DL (ref 0.51–0.95)
EGFRCR SERPLBLD CKD-EPI 2021: 81 ML/MIN/1.73M2

## 2024-09-27 PROCEDURE — 258N000003 HC RX IP 258 OP 636: Performed by: FAMILY MEDICINE

## 2024-09-27 PROCEDURE — 96365 THER/PROPH/DIAG IV INF INIT: CPT

## 2024-09-27 PROCEDURE — 250N000011 HC RX IP 250 OP 636: Performed by: FAMILY MEDICINE

## 2024-09-27 PROCEDURE — 36415 COLL VENOUS BLD VENIPUNCTURE: CPT | Performed by: FAMILY MEDICINE

## 2024-09-27 PROCEDURE — 82310 ASSAY OF CALCIUM: CPT | Performed by: FAMILY MEDICINE

## 2024-09-27 PROCEDURE — 82040 ASSAY OF SERUM ALBUMIN: CPT | Performed by: FAMILY MEDICINE

## 2024-09-27 PROCEDURE — 82565 ASSAY OF CREATININE: CPT | Performed by: FAMILY MEDICINE

## 2024-09-27 RX ORDER — ACETAMINOPHEN 325 MG/1
650 TABLET ORAL
Status: CANCELLED | OUTPATIENT
Start: 2025-09-27

## 2024-09-27 RX ORDER — ALBUTEROL SULFATE 90 UG/1
1-2 AEROSOL, METERED RESPIRATORY (INHALATION)
Status: CANCELLED
Start: 2025-09-27

## 2024-09-27 RX ORDER — DIPHENHYDRAMINE HYDROCHLORIDE 50 MG/ML
50 INJECTION INTRAMUSCULAR; INTRAVENOUS
Status: CANCELLED
Start: 2025-09-27

## 2024-09-27 RX ORDER — MEPERIDINE HYDROCHLORIDE 50 MG/ML
25 INJECTION INTRAMUSCULAR; INTRAVENOUS; SUBCUTANEOUS EVERY 30 MIN PRN
Status: CANCELLED | OUTPATIENT
Start: 2025-09-27

## 2024-09-27 RX ORDER — METHYLPREDNISOLONE SODIUM SUCCINATE 125 MG/2ML
125 INJECTION, POWDER, LYOPHILIZED, FOR SOLUTION INTRAMUSCULAR; INTRAVENOUS
Status: CANCELLED
Start: 2025-09-27

## 2024-09-27 RX ORDER — ZOLEDRONIC ACID 5 MG/100ML
5 INJECTION, SOLUTION INTRAVENOUS ONCE
Status: COMPLETED | OUTPATIENT
Start: 2024-09-27 | End: 2024-09-27

## 2024-09-27 RX ORDER — HEPARIN SODIUM (PORCINE) LOCK FLUSH IV SOLN 100 UNIT/ML 100 UNIT/ML
5 SOLUTION INTRAVENOUS
Status: CANCELLED | OUTPATIENT
Start: 2025-09-27

## 2024-09-27 RX ORDER — ALBUTEROL SULFATE 0.83 MG/ML
2.5 SOLUTION RESPIRATORY (INHALATION)
Status: CANCELLED | OUTPATIENT
Start: 2025-09-27

## 2024-09-27 RX ORDER — EPINEPHRINE 1 MG/ML
0.3 INJECTION, SOLUTION, CONCENTRATE INTRAVENOUS EVERY 5 MIN PRN
Status: CANCELLED | OUTPATIENT
Start: 2025-09-27

## 2024-09-27 RX ORDER — ZOLEDRONIC ACID 5 MG/100ML
5 INJECTION, SOLUTION INTRAVENOUS ONCE
Status: CANCELLED
Start: 2025-09-27

## 2024-09-27 RX ORDER — HEPARIN SODIUM,PORCINE 10 UNIT/ML
5-20 VIAL (ML) INTRAVENOUS DAILY PRN
Status: CANCELLED | OUTPATIENT
Start: 2025-09-27

## 2024-09-27 RX ADMIN — SODIUM CHLORIDE 250 ML: 9 INJECTION, SOLUTION INTRAVENOUS at 10:28

## 2024-09-27 RX ADMIN — ZOLEDRONIC ACID 5 MG: 0.05 INJECTION, SOLUTION INTRAVENOUS at 10:27

## 2024-09-27 NOTE — NURSING NOTE
Infusion Nursing Note:  Yanci uLther presents today for Reclast.    Patient seen by provider today: No   present during visit today: Not Applicable.    Note: N/A.      Intravenous Access:  Labs drawn without difficulty by .  Peripheral IV placed.    Treatment Conditions:  Lab Results   Component Value Date     07/22/2024    POTASSIUM 4.0 07/22/2024    CR 0.74 09/27/2024    FARSHAD 10.2 09/27/2024    BILITOTAL 0.7 07/22/2024    ALBUMIN 4.4 09/27/2024    ALT 10 07/22/2024    AST 20 07/22/2024       Results reviewed, labs MET treatment parameters, ok to proceed with treatment.      Post Infusion Assessment:  Patient tolerated infusion without incident.  Blood return noted pre and post infusion.  Site patent and intact, free from redness, edema or discomfort.  No evidence of extravasations.  Access discontinued per protocol.       Discharge Plan:   Discharge instructions reviewed with: Patient.  Patient and/or family verbalized understanding of discharge instructions and all questions answered.  Copy of AVS declined.  Patient will return yearly if needed for next appointment.  AVS to patient via [employer].    Patient discharged in stable condition accompanied by: self.  Departure Mode: Ambulatory.      Megan Linares RN

## 2024-10-02 ENCOUNTER — THERAPY VISIT (OUTPATIENT)
Dept: PHYSICAL THERAPY | Facility: OTHER | Age: 79
End: 2024-10-02
Attending: FAMILY MEDICINE
Payer: COMMERCIAL

## 2024-10-02 DIAGNOSIS — M25.552 HIP PAIN, LEFT: Primary | ICD-10-CM

## 2024-10-02 PROCEDURE — 97110 THERAPEUTIC EXERCISES: CPT | Mod: GP,PN

## 2024-10-02 PROCEDURE — 97140 MANUAL THERAPY 1/> REGIONS: CPT | Mod: GP,PN

## 2024-10-10 ENCOUNTER — THERAPY VISIT (OUTPATIENT)
Dept: PHYSICAL THERAPY | Facility: OTHER | Age: 79
End: 2024-10-10
Attending: FAMILY MEDICINE
Payer: COMMERCIAL

## 2024-10-10 DIAGNOSIS — M25.552 HIP PAIN, LEFT: Primary | ICD-10-CM

## 2024-10-10 PROCEDURE — 97110 THERAPEUTIC EXERCISES: CPT | Mod: GP,PN

## 2024-10-10 PROCEDURE — 97140 MANUAL THERAPY 1/> REGIONS: CPT | Mod: GP,PN

## 2024-10-24 ENCOUNTER — OFFICE VISIT (OUTPATIENT)
Dept: FAMILY MEDICINE | Facility: OTHER | Age: 79
End: 2024-10-24
Attending: NURSE PRACTITIONER
Payer: COMMERCIAL

## 2024-10-24 ENCOUNTER — HOSPITAL ENCOUNTER (OUTPATIENT)
Dept: GENERAL RADIOLOGY | Facility: OTHER | Age: 79
Discharge: HOME | End: 2024-10-24
Attending: STUDENT IN AN ORGANIZED HEALTH CARE EDUCATION/TRAINING PROGRAM
Payer: COMMERCIAL

## 2024-10-24 VITALS
RESPIRATION RATE: 20 BRPM | DIASTOLIC BLOOD PRESSURE: 76 MMHG | HEIGHT: 64 IN | SYSTOLIC BLOOD PRESSURE: 131 MMHG | TEMPERATURE: 97.9 F | WEIGHT: 139.2 LBS | BODY MASS INDEX: 23.76 KG/M2 | OXYGEN SATURATION: 96 % | HEART RATE: 80 BPM

## 2024-10-24 DIAGNOSIS — R05.2 SUBACUTE COUGH: Primary | ICD-10-CM

## 2024-10-24 DIAGNOSIS — R05.2 SUBACUTE COUGH: ICD-10-CM

## 2024-10-24 PROCEDURE — 99213 OFFICE O/P EST LOW 20 MIN: CPT | Performed by: STUDENT IN AN ORGANIZED HEALTH CARE EDUCATION/TRAINING PROGRAM

## 2024-10-24 PROCEDURE — G0463 HOSPITAL OUTPT CLINIC VISIT: HCPCS | Mod: 25

## 2024-10-24 PROCEDURE — 71046 X-RAY EXAM CHEST 2 VIEWS: CPT

## 2024-10-24 RX ORDER — METHYLPREDNISOLONE 4 MG/1
TABLET ORAL
Qty: 21 TABLET | Refills: 0 | Status: SHIPPED | OUTPATIENT
Start: 2024-10-24

## 2024-10-24 ASSESSMENT — PAIN SCALES - GENERAL: PAINLEVEL_OUTOF10: MILD PAIN (3)

## 2024-10-24 NOTE — PROGRESS NOTES
"  Assessment & Plan     (R05.2) Subacute cough  (primary encounter diagnosis)    Comment: Persistent cough.  Symptoms x 2 weeks.  Most likely postviral inflammation.  Vital signs are stable.  Oxygen of 96%.  Coarse sounding cough, though lung sound clear.  Chest x-ray without any evidence of pneumonia.    Plan: XR Chest 2 Views, methylPREDNISolone (MEDROL         DOSEPAK) 4 MG tablet therapy pack    She has benefited from methylprednisolone in the past.  Plan to treat with Medrol Dosepak.  Continue over-the-counter management.  Follow-up with PCP for any persisting symptoms.  Return to rapid clinic or ER symptoms worsen or change.  She is comfortable and agreeable with this plan.        Tiana Pete is a 79 year old, presenting for the following health issues:  Cough and Nasal Congestion    HPI    Patient presents today with  for concerns of cough and congestion.  States it has been going on for the last couple of weeks.  Cough is productive.  She also note sore throat at early onset of illness, that does seem to be improved.  She has not had any fevers.  She has been using Mucinex.  She has also tried albuterol which has not been helpful.  And cough drops which are helpful.      Review of Systems  Constitutional, HEENT, cardiovascular, pulmonary, gi and gu systems are negative, except as otherwise noted.        Objective    /76 (BP Location: Right arm, Patient Position: Sitting, Cuff Size: Adult Regular)   Pulse 80   Temp 97.9  F (36.6  C) (Tympanic)   Resp 20   Ht 1.629 m (5' 4.15\")   Wt 63.1 kg (139 lb 3.2 oz)   LMP 08/01/1994 (Approximate)   SpO2 96%   BMI 23.78 kg/m    Body mass index is 23.78 kg/m .    Physical Exam     GENERAL: alert and no distress  EYES: Eyes grossly normal to inspection, PERRL and conjunctivae and sclerae normal  HENT: ear canals and TM's normal, nose and mouth without ulcers or lesions  NECK: no adenopathy, no asymmetry, masses, or scars  RESP: intermittent " coarse cough, lungs clear to auscultation - no rales, rhonchi or wheezes  CV: regular rate and rhythm, normal S1 S2, no S3 or S4, no murmur, click or rub, no peripheral edema  MS: no gross musculoskeletal defects noted, no edema    Results for orders placed or performed during the hospital encounter of 10/24/24   XR Chest 2 Views     Status: None    Narrative    Exam:  XR CHEST 2 VIEWS    HISTORY: cough x 2 weeks; Subacute cough.    COMPARISON:  None.    FINDINGS:     The cardiomediastinal contours are normal.      No focal consolidation, effusion, or pneumothorax.      No acute osseous abnormality.       Impression    IMPRESSION:      No acute cardiopulmonary process.      ADRIANA PEREZ MD         SYSTEM ID:  [id number]         Signed Electronically by: Harriett Polanco PA-C

## 2024-10-24 NOTE — NURSING NOTE
"Chief Complaint   Patient presents with    Cough    Nasal Congestion     [facility] today for a cough and congestion for the last 2 weeks. Patient's partner states this happened last year and Prednisone was the only effective treatment.      Initial /76 (BP Location: Right arm, Patient Position: Sitting, Cuff Size: Adult Regular)   Pulse 80   Temp 97.9  F (36.6  C) (Tympanic)   Resp 20   Ht 1.629 m (5' 4.15\")   Wt 63.1 kg (139 lb 3.2 oz)   LMP 08/01/1994 (Approximate)   SpO2 96%   BMI 23.78 kg/m   Estimated body mass index is 23.78 kg/m  as calculated from the following:    Height as of this encounter: 1.629 m (5' 4.15\").    Weight as of this encounter: 63.1 kg (139 lb 3.2 oz).     FOOD SECURITY SCREENING QUESTIONS:    The next two questions are to help us understand your food security.  If you are feeling you need any assistance in this area, we have resources available to support you today.    Hunger Vital Signs:  Within the past 12 months we worried whether our food would run out before we got money to buy more. Never  Within the past 12 months the food we bought just didn't last and we didn't have money to get more. Never      Gayathri Currie     "

## 2024-10-24 NOTE — PATIENT INSTRUCTIONS
Persistent Cough    Medrol dose pack.    Continue OTC medications.    X-ray is clear, no pneumonia.    Lots of fluids.    Deep breathing exercises/activity.    Follow up with PCP for persisting symptoms.  Return to [facility] clinic/ER for worsening/changing symptoms.

## 2024-11-19 ENCOUNTER — APPOINTMENT (RX ONLY)
Dept: URBAN - NONMETROPOLITAN AREA CLINIC 4 | Facility: CLINIC | Age: 79
Setting detail: DERMATOLOGY
End: 2024-11-19

## 2024-11-19 DIAGNOSIS — L57.0 ACTINIC KERATOSIS: ICD-10-CM

## 2024-11-19 DIAGNOSIS — L82.0 INFLAMED SEBORRHEIC KERATOSIS: ICD-10-CM

## 2024-11-19 DIAGNOSIS — D485 NEOPLASM OF UNCERTAIN BEHAVIOR OF SKIN: ICD-10-CM

## 2024-11-19 DIAGNOSIS — L81.4 OTHER MELANIN HYPERPIGMENTATION: ICD-10-CM

## 2024-11-19 DIAGNOSIS — D22 MELANOCYTIC NEVI: ICD-10-CM

## 2024-11-19 PROBLEM — D48.5 NEOPLASM OF UNCERTAIN BEHAVIOR OF SKIN: Status: ACTIVE | Noted: 2024-11-19

## 2024-11-19 PROBLEM — D22.9 MELANOCYTIC NEVI, UNSPECIFIED: Status: ACTIVE | Noted: 2024-11-19

## 2024-11-19 PROCEDURE — 11306 SHAVE SKIN LESION 0.6-1.0 CM: CPT

## 2024-11-19 PROCEDURE — 17110 DESTRUCTION B9 LES UP TO 14: CPT | Mod: 59

## 2024-11-19 PROCEDURE — ? SHAVE REMOVAL

## 2024-11-19 PROCEDURE — 99213 OFFICE O/P EST LOW 20 MIN: CPT | Mod: 25

## 2024-11-19 PROCEDURE — 17000 DESTRUCT PREMALG LESION: CPT | Mod: 59

## 2024-11-19 PROCEDURE — ? COUNSELING

## 2024-11-19 PROCEDURE — ? LIQUID NITROGEN

## 2024-11-19 ASSESSMENT — LOCATION DETAILED DESCRIPTION DERM
LOCATION DETAILED: RIGHT INFERIOR FRONTAL SCALP
LOCATION DETAILED: RIGHT MEDIAL SUPERIOR CHEST
LOCATION DETAILED: RIGHT CENTRAL MALAR CHEEK

## 2024-11-19 ASSESSMENT — LOCATION SIMPLE DESCRIPTION DERM
LOCATION SIMPLE: RIGHT CHEEK
LOCATION SIMPLE: CHEST
LOCATION SIMPLE: SCALP

## 2024-11-19 ASSESSMENT — LOCATION ZONE DERM
LOCATION ZONE: SCALP
LOCATION ZONE: FACE
LOCATION ZONE: TRUNK

## 2024-11-19 ASSESSMENT — TOTAL NUMBER OF LESIONS: # OF LESIONS?: 1

## 2024-11-19 NOTE — PROCEDURE: SHAVE REMOVAL
Medical Necessity Information: It is in your best interest to select a reason for this procedure from the list below. All of these items fulfill various CMS LCD requirements except the new and changing color options.
Medical Necessity Clause: This procedure was medically necessary because the lesion that was treated was:
Lab: [id number]
Lab Facility: [id number]
Body Location Override (Optional - Billing Will Still Be Based On Selected Body Map Location If Applicable): right parietal scalp
Detail Level: Detailed
Was A Bandage Applied: Yes
Size Of Lesion In Cm (Required): 0.8
X Size Of Lesion In Cm (Optional): 0
Depth Of Shave: dermis
Biopsy Method: Dermablade
Anesthesia Type: 1% lidocaine with epinephrine
Hemostasis: Electrocautery
Wound Care: Vaseline
Path Notes (To The Dermatopathologist): Size: 0.8 cm  R/O: BCC vs SCC
Render Path Notes In Note?: No
Consent was obtained from the patient. The risks and benefits to therapy were discussed in detail. Specifically, the risks of infection, scarring, bleeding, prolonged wound healing, incomplete removal, allergy to anesthesia, nerve injury and recurrence were addressed. Prior to the procedure, the treatment site was clearly identified and confirmed by the patient. All components of Universal Protocol/PAUSE Rule completed.
Post-Care Instructions: I reviewed with the patient in detail post-care instructions. Patient is to keep the biopsy site dry overnight, and then apply bacitracin twice daily until healed. Patient may apply hydrogen peroxide soaks to remove any crusting.
Notification Instructions: Patient will be notified of pathology results. However, patient instructed to call the office if not contacted within 2 weeks.
Billing Type: Third-Party Bill

## 2024-11-19 NOTE — PROCEDURE: LIQUID NITROGEN
Show Topical Anesthesia Variable?: Yes
Duration Of Freeze Thaw-Cycle (Seconds): 5-10
Medical Necessity Information: It is in your best interest to select a reason for this procedure from the list below. All of these items fulfill various CMS LCD requirements except the new and changing color options.
Include Z78.9 (Other Specified Conditions Influencing Health Status) As An Associated Diagnosis?: No
Number Of Freeze-Thaw Cycles: 3 freeze-thaw cycles
Medical Necessity Clause: This procedure was medically necessary because the lesions that were treated were:
Post-Care Instructions: I reviewed with the patient in detail post-care instructions. Patient is to wear sunprotection, and avoid picking at any of the treated lesions. Pt may apply Vaseline to crusted or scabbing areas.
Spray Paint Text: The liquid nitrogen was applied to the skin utilizing a spray paint frosting technique.
Application Tool (Optional): Cry-AC
Consent: The patient's consent was obtained including but not limited to risks of crusting, scabbing, blistering, scarring, darker or lighter pigmentary change, recurrence, incomplete removal and infection.
Detail Level: Detailed
Number Of Freeze-Thaw Cycles: 2 freeze-thaw cycles
Duration Of Freeze Thaw-Cycle (Seconds): 5

## 2024-12-10 ENCOUNTER — APPOINTMENT (OUTPATIENT)
Dept: URBAN - NONMETROPOLITAN AREA CLINIC 4 | Facility: CLINIC | Age: 79
Setting detail: DERMATOLOGY
End: 2024-12-10

## 2024-12-10 DIAGNOSIS — L72.8 OTHER FOLLICULAR CYSTS OF THE SKIN AND SUBCUTANEOUS TISSUE: ICD-10-CM

## 2024-12-10 DIAGNOSIS — L30.8 OTHER SPECIFIED DERMATITIS: ICD-10-CM

## 2024-12-10 PROCEDURE — ? CONSULTATION EXCISION

## 2024-12-10 PROCEDURE — 99213 OFFICE O/P EST LOW 20 MIN: CPT

## 2024-12-10 PROCEDURE — ? ADDITIONAL NOTES

## 2024-12-10 PROCEDURE — ? PATHOLOGY DISCUSSION

## 2024-12-10 PROCEDURE — ? COUNSELING

## 2025-07-28 ENCOUNTER — LAB (OUTPATIENT)
Dept: LAB | Facility: OTHER | Age: 80
End: 2025-07-28
Attending: FAMILY MEDICINE
Payer: COMMERCIAL

## 2025-07-28 ENCOUNTER — RESULTS FOLLOW-UP (OUTPATIENT)
Dept: FAMILY MEDICINE | Facility: OTHER | Age: 80
End: 2025-07-28

## 2025-07-28 ENCOUNTER — OFFICE VISIT (OUTPATIENT)
Dept: FAMILY MEDICINE | Facility: OTHER | Age: 80
End: 2025-07-28
Attending: FAMILY MEDICINE
Payer: COMMERCIAL

## 2025-07-28 VITALS
SYSTOLIC BLOOD PRESSURE: 134 MMHG | WEIGHT: 129.6 LBS | HEIGHT: 62 IN | OXYGEN SATURATION: 95 % | HEART RATE: 81 BPM | DIASTOLIC BLOOD PRESSURE: 74 MMHG | BODY MASS INDEX: 23.85 KG/M2 | RESPIRATION RATE: 16 BRPM

## 2025-07-28 DIAGNOSIS — Z13.29 SCREENING FOR THYROID DISORDER: ICD-10-CM

## 2025-07-28 DIAGNOSIS — E78.2 MIXED HYPERLIPIDEMIA: ICD-10-CM

## 2025-07-28 DIAGNOSIS — Z00.00 ENCOUNTER FOR MEDICARE ANNUAL WELLNESS EXAM: ICD-10-CM

## 2025-07-28 DIAGNOSIS — M85.80 OSTEOPENIA, UNSPECIFIED LOCATION: ICD-10-CM

## 2025-07-28 DIAGNOSIS — Z12.31 VISIT FOR SCREENING MAMMOGRAM: Primary | ICD-10-CM

## 2025-07-28 DIAGNOSIS — Z00.00 MEDICARE ANNUAL WELLNESS VISIT, SUBSEQUENT: Primary | ICD-10-CM

## 2025-07-28 PROBLEM — H81.10 BPPV (BENIGN PAROXYSMAL POSITIONAL VERTIGO): Status: RESOLVED | Noted: 2017-08-31 | Resolved: 2025-07-28

## 2025-07-28 LAB
ALBUMIN SERPL BCG-MCNC: 4 G/DL (ref 3.5–5.2)
ALP SERPL-CCNC: 25 U/L (ref 40–150)
ALT SERPL W P-5'-P-CCNC: 11 U/L (ref 0–50)
ANION GAP SERPL CALCULATED.3IONS-SCNC: 12 MMOL/L (ref 7–15)
AST SERPL W P-5'-P-CCNC: 19 U/L (ref 0–45)
BASOPHILS # BLD AUTO: 0 10E3/UL (ref 0–0.2)
BASOPHILS NFR BLD AUTO: 0 %
BILIRUB SERPL-MCNC: 1.1 MG/DL
BUN SERPL-MCNC: 11.6 MG/DL (ref 8–23)
CALCIUM SERPL-MCNC: 9.6 MG/DL (ref 8.8–10.4)
CHLORIDE SERPL-SCNC: 101 MMOL/L (ref 98–107)
CHOLEST SERPL-MCNC: 213 MG/DL
CREAT SERPL-MCNC: 0.7 MG/DL (ref 0.51–0.95)
EGFRCR SERPLBLD CKD-EPI 2021: 86 ML/MIN/1.73M2
EOSINOPHIL # BLD AUTO: 0 10E3/UL (ref 0–0.7)
EOSINOPHIL NFR BLD AUTO: 0 %
ERYTHROCYTE [DISTWIDTH] IN BLOOD BY AUTOMATED COUNT: 13.6 % (ref 10–15)
FASTING STATUS PATIENT QL REPORTED: YES
FASTING STATUS PATIENT QL REPORTED: YES
GLUCOSE SERPL-MCNC: 94 MG/DL (ref 70–99)
HCO3 SERPL-SCNC: 26 MMOL/L (ref 22–29)
HCT VFR BLD AUTO: 38.4 % (ref 35–47)
HDLC SERPL-MCNC: 122 MG/DL
HGB BLD-MCNC: 12.9 G/DL (ref 11.7–15.7)
IMM GRANULOCYTES # BLD: 0 10E3/UL
IMM GRANULOCYTES NFR BLD: 0 %
LDLC SERPL CALC-MCNC: 81 MG/DL
LYMPHOCYTES # BLD AUTO: 1.2 10E3/UL (ref 0.8–5.3)
LYMPHOCYTES NFR BLD AUTO: 13 %
MCH RBC QN AUTO: 32.5 PG (ref 26.5–33)
MCHC RBC AUTO-ENTMCNC: 33.6 G/DL (ref 31.5–36.5)
MCV RBC AUTO: 97 FL (ref 78–100)
MONOCYTES # BLD AUTO: 1 10E3/UL (ref 0–1.3)
MONOCYTES NFR BLD AUTO: 11 %
NEUTROPHILS # BLD AUTO: 6.9 10E3/UL (ref 1.6–8.3)
NEUTROPHILS NFR BLD AUTO: 75 %
NONHDLC SERPL-MCNC: 91 MG/DL
NRBC # BLD AUTO: 0 10E3/UL
NRBC BLD AUTO-RTO: 0 /100
PLATELET # BLD AUTO: 159 10E3/UL (ref 150–450)
POTASSIUM SERPL-SCNC: 4.1 MMOL/L (ref 3.4–5.3)
PROT SERPL-MCNC: 6.6 G/DL (ref 6.4–8.3)
RBC # BLD AUTO: 3.97 10E6/UL (ref 3.8–5.2)
SODIUM SERPL-SCNC: 139 MMOL/L (ref 135–145)
TRIGL SERPL-MCNC: 50 MG/DL
TSH SERPL DL<=0.005 MIU/L-ACNC: 0.89 UIU/ML (ref 0.3–4.2)
WBC # BLD AUTO: 9.2 10E3/UL (ref 4–11)

## 2025-07-28 PROCEDURE — 3078F DIAST BP <80 MM HG: CPT | Performed by: FAMILY MEDICINE

## 2025-07-28 PROCEDURE — G0463 HOSPITAL OUTPT CLINIC VISIT: HCPCS

## 2025-07-28 PROCEDURE — 1125F AMNT PAIN NOTED PAIN PRSNT: CPT | Performed by: FAMILY MEDICINE

## 2025-07-28 PROCEDURE — 3075F SYST BP GE 130 - 139MM HG: CPT | Performed by: FAMILY MEDICINE

## 2025-07-28 PROCEDURE — 85025 COMPLETE CBC W/AUTO DIFF WBC: CPT | Mod: ZL

## 2025-07-28 PROCEDURE — 80053 COMPREHEN METABOLIC PANEL: CPT | Mod: ZL

## 2025-07-28 PROCEDURE — 84443 ASSAY THYROID STIM HORMONE: CPT | Mod: ZL

## 2025-07-28 PROCEDURE — 80061 LIPID PANEL: CPT | Mod: ZL

## 2025-07-28 PROCEDURE — G0439 PPPS, SUBSEQ VISIT: HCPCS | Performed by: FAMILY MEDICINE

## 2025-07-28 PROCEDURE — 36415 COLL VENOUS BLD VENIPUNCTURE: CPT | Mod: ZL

## 2025-07-28 SDOH — HEALTH STABILITY: PHYSICAL HEALTH: ON AVERAGE, HOW MANY DAYS PER WEEK DO YOU ENGAGE IN MODERATE TO STRENUOUS EXERCISE (LIKE A BRISK WALK)?: 3 DAYS

## 2025-07-28 ASSESSMENT — SOCIAL DETERMINANTS OF HEALTH (SDOH): HOW OFTEN DO YOU GET TOGETHER WITH FRIENDS OR RELATIVES?: TWICE A WEEK

## 2025-07-28 ASSESSMENT — PAIN SCALES - GENERAL: PAINLEVEL_OUTOF10: MILD PAIN (3)

## 2025-07-28 NOTE — PROGRESS NOTES
Preventive Care Visit  [facility] AND [facility]  Janes Gunter MD, Family Medicine  Jul 28, 2025      Assessment & Plan     Medicare annual wellness visit, subsequent  Reviewed immunization recommendations.  For her low blood pressure given that this occurs once a month and lasts for less than a minute I am not sure that any sort of monitoring would be helpful.  If this becomes more persistent would consider repeating Holter monitor and echocardiogram.  Patient and  were agreeable with this and did not want a cardiology referral.    Mixed hyperlipidemia  Reviewed recent labs which show cholesterol panel is stable.    Osteopenia, unspecified location  Discussed evidence for continuing bisphosphonates versus taking a break, after consideration she elected to stop Reclast injections which I think is reasonable.  Could consider repeat DEXA scan in a few years.  Patient has been advised of split billing requirements and indicates understanding: Yes    Counseling  Appropriate preventive services were addressed with this patient via screening, questionnaire, or discussion as appropriate for fall prevention, nutrition, physical activity, Tobacco-use cessation, social engagement, weight loss and cognition.  Checklist reviewing preventive services available has been given to the patient.  Reviewed patient's diet, addressing concerns and/or questions.   She is at risk for lack of exercise and has been provided with information to increase physical activity for the benefit of her well-being.   The patient was instructed to see the dentist every 6 months.   Discussed possible causes of fatigue. The patient reports drinking more than 3 alcoholic drinks per day and/or more than 7 drhnks per week. The patient was counseled and given information about possible harmful effects of excessive alcohol intake.Reviewed preventive health counseling, as reflected in patient instructions      Tiana Pete is a 80 year  old, presenting for the following:  Medicare Visit (Annual medicare wellness visit)        7/28/2025     3:20 PM   Additional Questions   Roomed by BANDAR Amador   Accompanied by spouse          History of Present Illness       Hyperlipidemia:  She presents for follow up of hyperlipidemia.   She is not taking medication to lower cholesterol. She is not having myalgia or other side effects to statin medications.     She comes in today for annual wellness exam.  She has a history of osteoporosis and has been using Reclast.  Her last DEXA scan showed osteopenia.    She continues to have difficulty swallowing pills, she was seen by ENT where there is no concerning findings noted with laryngoscopy.  She has adjusted her diet and switch to liquid supplements.    She has occasional episodes of low blood pressure.  These are not consistent with regards to time of day, activity, position etc.  She had a cardiovascular workup in 2022 including normal angiogram.  Holter monitoring showed PACs and PVCs with 2% burden and echocardiogram was relatively unremarkable.  No recent chest pain, shortness of breath, palpitations or cardiovascular exertional symptoms.    Advance Care Planning    Discussed advance care planning with patient; informed AVS has link to [employer] [employer].        7/28/2025   General Health   How would you rate your overall physical health? (!) FAIR   Feel stress (tense, anxious, or unable to sleep) Not at all         7/28/2025   Nutrition   Diet: Regular (no restrictions)         7/28/2025   Exercise   Days per week of moderate/strenous exercise 3 days         7/28/2025   Social Factors   Frequency of gathering with friends or relatives Twice a week   Worry food won't last until get money to buy more No   Food not last or not have enough money for food? No   Do you have housing? (Housing is defined as stable permanent housing and does not include staying outside in a car, in a tent, in an abandoned building, in  an overnight shelter, or couch-surfing.) Yes   Are you worried about losing your housing? No   Lack of transportation? No   Unable to get utilities (heat,electricity)? No         7/28/2025   Fall Risk   Fallen 2 or more times in the past year? No   Trouble with walking or balance? No          7/28/2025   Activities of Daily Living- Home Safety   Needs help with the following daily activites None of the above   Safety concerns in the home None of the above         7/28/2025   Dental   Dentist two times every year? (!) NO         7/28/2025   Hearing Screening   Hearing concerns? None of the above         7/28/2025   Driving Risk Screening   Patient/family members have concerns about driving No         7/28/2025   General Alertness/Fatigue Screening   Have you been more tired than usual lately? (!) YES         7/28/2025   Urinary Incontinence Screening   Bothered by leaking urine in past 6 months No         Today's PHQ-2 Score:       7/28/2025     3:22 PM   PHQ-2 ( 1999 [employer])   Q1: Little interest or pleasure in doing things 0   Q2: Feeling down, depressed or hopeless 0   PHQ-2 Score 0    Q1: Little interest or pleasure in doing things Not at all   Q2: Feeling down, depressed or hopeless Not at all   PHQ-2 Score 0       Patient-reported           7/28/2025   Substance Use   Alcohol more than 3/day or more than 7/wk Yes   How often do you have a drink containing alcohol 4 or more times a week   How many alcohol drinks on typical day 3 or 4   How often do you have 5+ drinks at one occasion Never   Audit 2/3 Score 1   How often not able to stop drinking once started Never   How often failed to do what normally expected Never   How often needed first drink in am after a heavy drinking session Never   How often feeling of guilt or remorse after drinking Never   How often unable to remember what happened the night before Never   Have you or someone else been injured because of your drinking No   Has anyone been concerned  "or suggested you cut down on drinking No   TOTAL SCORE - AUDIT 5   Do you have a current opioid prescription? No   How severe/bad is pain from 1 to 10? 0/10 (No Pain)   Do you use any other substances recreationally? No     Social History     Tobacco Use    Smoking status: Former    Smokeless tobacco: Never    Tobacco comments:     \"quit about 30 years ago\"   Vaping Use    Vaping status: Never Used   Substance Use Topics    Alcohol use: Yes     Comment: 3 wine or beer daily    Drug use: Never           10/6/2022   LAST FHS-7 RESULTS   1st degree relative breast or ovarian cancer No   Any relative bilateral breast cancer No   Any male have breast cancer No   Any ONE woman have BOTH breast AND ovarian cancer No   Any woman with breast cancer before 50yrs No   2 or more relatives with breast AND/OR ovarian cancer No   2 or more relatives with breast AND/OR bowel cancer No              Reviewed and updated as needed this visit by Provider                    Current providers sharing in care for this patient include:  Patient Care Team:  Janes Gunter MD as PCP - General (Family Practice)  Janes Gunter MD as Assigned PCP  Deven Lopez MD as Assigned Surgical Provider    The following health maintenance items are reviewed in Epic and correct as of today:  Health Maintenance   Topic Date Due    SPIROMETRY  Never done    COPD ACTION PLAN  Never done    DTAP/TDAP/TD VACCINE (1 - Tdap) Never done    RSV VACCINE (1 - 1-dose 75+ series) Never done    ZOSTER VACCINE (2 of 2) 11/13/2020    COVID-19 VACCINE (2 - 2024-25 season) 04/03/2025    MEDICARE ANNUAL WELLNESS VISIT  07/22/2025    INFLUENZA VACCINE (1) 09/01/2025    FALL RISK ASSESSMENT  07/28/2026    ADVANCE CARE PLANNING  07/22/2029    DEXA  10/06/2037    PHQ-2 (once per calendar year)  Completed    PNEUMOCOCCAL VACCINE 50+ YEARS  Completed    HPV VACCINE (No Doses Required) Completed    MENINGITIS VACCINE  Aged Out    MAMMO SCREENING  Discontinued    COLORECTAL " "CANCER SCREENING  Discontinued        Objective    Exam  /74 (BP Location: Right arm, Patient Position: Sitting, Cuff Size: Adult Regular)   Pulse 81   Resp 16   Ht 1.575 m (5' 2\")   Wt 58.8 kg (129 lb 9.6 oz)   LMP 08/01/1994 (Approximate)   SpO2 95%   BMI 23.70 kg/m     Estimated body mass index is 23.7 kg/m  as calculated from the following:    Height as of this encounter: 1.575 m (5' 2\").    Weight as of this encounter: 58.8 kg (129 lb 9.6 oz).    Physical Exam  GENERAL: alert and no distress  EYES: Eyes grossly normal to inspection, PERRL and conjunctivae and sclerae normal  HENT: ear canals and TM's normal, nose and mouth without ulcers or lesions  NECK: Firmness over anterior neck consistent with previous radiation was noted.  No new masses.  RESP: lungs clear to auscultation - no rales, rhonchi or wheezes  CV: regular rate and rhythm, normal S1 S2, no S3 or S4, no murmur, click or rub, no peripheral edema   ABDOMEN: soft, nontender, no hepatosplenomegaly, no masses and bowel sounds normal  MS: no gross musculoskeletal defects noted, no edema  SKIN: no suspicious lesions or rashes  NEURO: Normal strength and tone, mentation intact and speech normal  PSYCH: mentation appears normal, affect normal/bright        7/28/2025   Mini Cog   Clock Draw Score 0 Abnormal   3 Item Recall 2 objects recalled   Mini Cog Total Score 2              Signed Electronically by: Janes Gunter MD    "

## (undated) DEVICE — PREP CHLORAPREP 26ML TINTED HI-LITE ORANGE 930815

## (undated) DEVICE — Device

## (undated) DEVICE — ENDO KIT COMPLIANCE DYKENDOCMPLY

## (undated) DEVICE — SU VICRYL 3-0 CT-2 27" UND J232H

## (undated) DEVICE — ENDO SNARE POLYPECTOMY OVAL 10MM LOOP SD-240U-10

## (undated) DEVICE — SUCTION MANIFOLD NEPTUNE 2 SYS 4 PORT 0702-020-000

## (undated) DEVICE — BNDG ELASTIC 4"X5YDS UNSTERILE 6611-40

## (undated) DEVICE — ENDO SNARE EXACTO COLD 9MM LOOP 2.4MMX230CM 00711115

## (undated) DEVICE — DRSG SPONGE STERILE 8 X 4 2259

## (undated) DEVICE — DRSG GAUZE 4X4" 3033

## (undated) DEVICE — SOL WATER IRRIG 1000ML BOTTLE 2F7114

## (undated) DEVICE — DRAPE C-ARM 60X42" 1013

## (undated) DEVICE — SOL WATER 1500ML

## (undated) DEVICE — PACK BASIN SET UP SUTCNBSBBA

## (undated) DEVICE — BNDG ESMARK 4" STERILE LF 820-3412

## (undated) DEVICE — DRAPE EXTREMITY UPPER 120X76" 29414

## (undated) DEVICE — DRAPE STERI TOWEL LG 1010

## (undated) DEVICE — ENDO BRUSH CHANNEL MASTER CLEANING 2-4.2MM BW-412T

## (undated) DEVICE — GOWN SURG XXL LVL 3 REINFORCED 9571

## (undated) DEVICE — GOWN SURG XL LVL 3 REINFORCED 9541

## (undated) DEVICE — ESU GROUND PAD ADULT W/CORD E7507

## (undated) DEVICE — NDL BLUNT 18GA 1.5" W/O FILTER 305180

## (undated) DEVICE — ENDO TRAP POLYP E-TRAP 00711099

## (undated) DEVICE — CANISTER SUCTION MEDI-VAC GUARDIAN 2000ML 90D 65651-220

## (undated) DEVICE — LABEL STERILE PREPRINTED FOR OR FRRH01-2M

## (undated) DEVICE — TUBING SUCTION 10'X3/16" N510

## (undated) DEVICE — ENDO FORCEP ENDOJAW BIOPSY 2.8MMX230CM FB-220U

## (undated) DEVICE — DRSG XEROFORM GAUZE 1X8" LP 8884433301

## (undated) DEVICE — COVER LT HANDLE 2/PK 5160-2FG

## (undated) DEVICE — PACK SET UP CUSTOM SBA32SUMBF

## (undated) DEVICE — TUBING SUCTION 20FT N620A

## (undated) DEVICE — BLADE 15 RB BK SS STRL LF DISPLF DISP 371215

## (undated) DEVICE — SU MONOCRYL 4-0 PS-2 18" UND Y496G

## (undated) DEVICE — SLEEVE SCD EXPRESS KNEE LENGTH MED 9529

## (undated) DEVICE — DRAPE SHEET REV FOLD 3/4 9349

## (undated) DEVICE — ESU PENCIL W/SMOKE EVAC CVPLP2000

## (undated) DEVICE — SOL NACL 0.9% IRRIG 1000ML BOTTLE 2F7124

## (undated) DEVICE — ESU ENDO FORCEP BX HOT FD-210U

## (undated) DEVICE — PADDING CAST 4IN WEBRIL STRL 2502

## (undated) RX ORDER — PROPOFOL 10 MG/ML
INJECTION, EMULSION INTRAVENOUS
Status: DISPENSED
Start: 2020-09-22

## (undated) RX ORDER — PROPOFOL 10 MG/ML
INJECTION, EMULSION INTRAVENOUS
Status: DISPENSED
Start: 2023-06-23

## (undated) RX ORDER — FENTANYL CITRATE 50 UG/ML
INJECTION, SOLUTION INTRAMUSCULAR; INTRAVENOUS
Status: DISPENSED
Start: 2023-06-23

## (undated) RX ORDER — PROPOFOL 10 MG/ML
INJECTION, EMULSION INTRAVENOUS
Status: DISPENSED
Start: 2023-10-16

## (undated) RX ORDER — ONDANSETRON 2 MG/ML
INJECTION INTRAMUSCULAR; INTRAVENOUS
Status: DISPENSED
Start: 2023-06-23

## (undated) RX ORDER — HYDROMORPHONE HCL IN WATER/PF 6 MG/30 ML
PATIENT CONTROLLED ANALGESIA SYRINGE INTRAVENOUS
Status: DISPENSED
Start: 2023-06-19

## (undated) RX ORDER — LIDOCAINE HYDROCHLORIDE 20 MG/ML
INJECTION, SOLUTION EPIDURAL; INFILTRATION; INTRACAUDAL; PERINEURAL
Status: DISPENSED
Start: 2019-09-11

## (undated) RX ORDER — HYDROMORPHONE HYDROCHLORIDE 1 MG/ML
INJECTION, SOLUTION INTRAMUSCULAR; INTRAVENOUS; SUBCUTANEOUS
Status: DISPENSED
Start: 2023-06-19

## (undated) RX ORDER — PROPOFOL 10 MG/ML
INJECTION, EMULSION INTRAVENOUS
Status: DISPENSED
Start: 2019-09-11

## (undated) RX ORDER — ONDANSETRON 4 MG/1
TABLET, ORALLY DISINTEGRATING ORAL
Status: DISPENSED
Start: 2023-06-19